# Patient Record
Sex: FEMALE | Race: ASIAN | NOT HISPANIC OR LATINO
[De-identification: names, ages, dates, MRNs, and addresses within clinical notes are randomized per-mention and may not be internally consistent; named-entity substitution may affect disease eponyms.]

---

## 2017-01-07 ENCOUNTER — OTHER (OUTPATIENT)
Age: 62
End: 2017-01-07

## 2017-02-10 ENCOUNTER — MOBILE ON CALL (OUTPATIENT)
Age: 62
End: 2017-02-10

## 2017-02-21 ENCOUNTER — APPOINTMENT (OUTPATIENT)
Dept: ULTRASOUND IMAGING | Facility: CLINIC | Age: 62
End: 2017-02-21

## 2017-02-21 ENCOUNTER — APPOINTMENT (OUTPATIENT)
Dept: MAMMOGRAPHY | Facility: CLINIC | Age: 62
End: 2017-02-21

## 2017-02-23 ENCOUNTER — APPOINTMENT (OUTPATIENT)
Dept: OPHTHALMOLOGY | Facility: CLINIC | Age: 62
End: 2017-02-23

## 2017-02-27 ENCOUNTER — MEDICATION RENEWAL (OUTPATIENT)
Age: 62
End: 2017-02-27

## 2017-03-21 ENCOUNTER — CLINICAL ADVICE (OUTPATIENT)
Age: 62
End: 2017-03-21

## 2017-03-21 DIAGNOSIS — H40.009 PREGLAUCOMA, UNSPECIFIED, UNSPECIFIED EYE: ICD-10-CM

## 2017-04-19 ENCOUNTER — APPOINTMENT (OUTPATIENT)
Dept: PULMONOLOGY | Facility: CLINIC | Age: 62
End: 2017-04-19

## 2017-05-24 ENCOUNTER — MEDICATION RENEWAL (OUTPATIENT)
Age: 62
End: 2017-05-24

## 2017-05-24 ENCOUNTER — APPOINTMENT (OUTPATIENT)
Dept: INTERNAL MEDICINE | Facility: CLINIC | Age: 62
End: 2017-05-24

## 2017-05-24 VITALS
HEIGHT: 63 IN | BODY MASS INDEX: 28 KG/M2 | SYSTOLIC BLOOD PRESSURE: 123 MMHG | HEART RATE: 75 BPM | WEIGHT: 158 LBS | DIASTOLIC BLOOD PRESSURE: 70 MMHG

## 2017-05-24 RX ORDER — CHROMIUM 200 MCG
TABLET ORAL
Refills: 0 | Status: ACTIVE | COMMUNITY

## 2017-07-13 ENCOUNTER — OTHER (OUTPATIENT)
Age: 62
End: 2017-07-13

## 2017-07-20 ENCOUNTER — CHART COPY (OUTPATIENT)
Age: 62
End: 2017-07-20

## 2017-07-25 ENCOUNTER — APPOINTMENT (OUTPATIENT)
Dept: INTERNAL MEDICINE | Facility: CLINIC | Age: 62
End: 2017-07-25

## 2017-07-25 VITALS
HEART RATE: 78 BPM | RESPIRATION RATE: 14 BRPM | BODY MASS INDEX: 29.41 KG/M2 | DIASTOLIC BLOOD PRESSURE: 58 MMHG | WEIGHT: 166 LBS | SYSTOLIC BLOOD PRESSURE: 122 MMHG

## 2017-07-25 DIAGNOSIS — Z82.49 FAMILY HISTORY OF ISCHEMIC HEART DISEASE AND OTHER DISEASES OF THE CIRCULATORY SYSTEM: ICD-10-CM

## 2017-07-25 DIAGNOSIS — F15.90 OTHER STIMULANT USE, UNSPECIFIED, UNCOMPLICATED: ICD-10-CM

## 2017-07-25 DIAGNOSIS — Z83.49 FAMILY HISTORY OF OTHER ENDOCRINE, NUTRITIONAL AND METABOLIC DISEASES: ICD-10-CM

## 2017-07-25 DIAGNOSIS — Z82.5 FAMILY HISTORY OF ASTHMA AND OTHER CHRONIC LOWER RESPIRATORY DISEASES: ICD-10-CM

## 2017-07-25 DIAGNOSIS — Z83.518 FAMILY HISTORY OF OTHER SPECIFIED EYE DISORDER: ICD-10-CM

## 2017-07-25 DIAGNOSIS — Z12.11 ENCOUNTER FOR SCREENING FOR MALIGNANT NEOPLASM OF COLON: ICD-10-CM

## 2017-07-25 DIAGNOSIS — M25.511 PAIN IN RIGHT SHOULDER: ICD-10-CM

## 2017-07-25 DIAGNOSIS — Z87.898 PERSONAL HISTORY OF OTHER SPECIFIED CONDITIONS: ICD-10-CM

## 2017-07-25 DIAGNOSIS — M75.111 INCOMPLETE ROTATOR CUFF TEAR OR RUPTURE OF RIGHT SHOULDER, NOT SPECIFIED AS TRAUMATIC: ICD-10-CM

## 2017-07-25 DIAGNOSIS — Z80.6 FAMILY HISTORY OF LEUKEMIA: ICD-10-CM

## 2017-08-28 ENCOUNTER — APPOINTMENT (OUTPATIENT)
Dept: OPHTHALMOLOGY | Facility: CLINIC | Age: 62
End: 2017-08-28
Payer: COMMERCIAL

## 2017-08-28 PROCEDURE — 92083 EXTENDED VISUAL FIELD XM: CPT

## 2017-08-28 PROCEDURE — 92012 INTRM OPH EXAM EST PATIENT: CPT

## 2017-12-01 ENCOUNTER — OTHER (OUTPATIENT)
Age: 62
End: 2017-12-01

## 2018-01-18 ENCOUNTER — OTHER (OUTPATIENT)
Age: 63
End: 2018-01-18

## 2018-01-23 ENCOUNTER — OTHER (OUTPATIENT)
Age: 63
End: 2018-01-23

## 2018-01-25 ENCOUNTER — APPOINTMENT (OUTPATIENT)
Dept: INTERNAL MEDICINE | Facility: CLINIC | Age: 63
End: 2018-01-25

## 2018-02-02 ENCOUNTER — APPOINTMENT (OUTPATIENT)
Dept: INTERNAL MEDICINE | Facility: CLINIC | Age: 63
End: 2018-02-02
Payer: COMMERCIAL

## 2018-02-02 VITALS
DIASTOLIC BLOOD PRESSURE: 68 MMHG | RESPIRATION RATE: 14 BRPM | BODY MASS INDEX: 29.41 KG/M2 | WEIGHT: 166 LBS | SYSTOLIC BLOOD PRESSURE: 105 MMHG | HEART RATE: 64 BPM

## 2018-02-02 PROCEDURE — 99396 PREV VISIT EST AGE 40-64: CPT

## 2018-02-02 PROCEDURE — 99214 OFFICE O/P EST MOD 30 MIN: CPT | Mod: 25

## 2018-02-02 PROCEDURE — G0444 DEPRESSION SCREEN ANNUAL: CPT

## 2018-02-02 RX ORDER — ESTRADIOL 10 UG/1
10 TABLET VAGINAL
Qty: 8 | Refills: 0 | Status: ACTIVE | COMMUNITY
Start: 2017-10-06

## 2018-02-28 ENCOUNTER — APPOINTMENT (OUTPATIENT)
Dept: OPHTHALMOLOGY | Facility: CLINIC | Age: 63
End: 2018-02-28
Payer: COMMERCIAL

## 2018-02-28 DIAGNOSIS — H40.003 PREGLAUCOMA, UNSPECIFIED, BILATERAL: ICD-10-CM

## 2018-02-28 PROCEDURE — 92014 COMPRE OPH EXAM EST PT 1/>: CPT

## 2018-04-17 ENCOUNTER — RESULT CHARGE (OUTPATIENT)
Age: 63
End: 2018-04-17

## 2018-05-09 ENCOUNTER — RX RENEWAL (OUTPATIENT)
Age: 63
End: 2018-05-09

## 2018-08-22 ENCOUNTER — APPOINTMENT (OUTPATIENT)
Dept: INTERNAL MEDICINE | Facility: CLINIC | Age: 63
End: 2018-08-22
Payer: COMMERCIAL

## 2018-08-22 PROCEDURE — ZZZZZ: CPT

## 2018-08-28 ENCOUNTER — APPOINTMENT (OUTPATIENT)
Dept: INTERNAL MEDICINE | Facility: CLINIC | Age: 63
End: 2018-08-28
Payer: COMMERCIAL

## 2018-08-28 VITALS — HEART RATE: 70 BPM | RESPIRATION RATE: 14 BRPM | DIASTOLIC BLOOD PRESSURE: 70 MMHG | SYSTOLIC BLOOD PRESSURE: 108 MMHG

## 2018-08-28 PROCEDURE — 99213 OFFICE O/P EST LOW 20 MIN: CPT

## 2018-08-28 RX ORDER — IPRATROPIUM BROMIDE 17 UG/1
17 AEROSOL, METERED RESPIRATORY (INHALATION) 4 TIMES DAILY
Qty: 1 | Refills: 1 | Status: DISCONTINUED | COMMUNITY
Start: 2018-02-02 | End: 2018-08-28

## 2018-09-04 ENCOUNTER — OTHER (OUTPATIENT)
Age: 63
End: 2018-09-04

## 2018-09-18 ENCOUNTER — APPOINTMENT (OUTPATIENT)
Dept: INTERNAL MEDICINE | Facility: CLINIC | Age: 63
End: 2018-09-18
Payer: COMMERCIAL

## 2018-09-18 VITALS
DIASTOLIC BLOOD PRESSURE: 76 MMHG | SYSTOLIC BLOOD PRESSURE: 122 MMHG | WEIGHT: 168 LBS | HEART RATE: 70 BPM | RESPIRATION RATE: 14 BRPM | BODY MASS INDEX: 29.76 KG/M2

## 2018-09-18 DIAGNOSIS — Z87.898 PERSONAL HISTORY OF OTHER SPECIFIED CONDITIONS: ICD-10-CM

## 2018-09-18 PROCEDURE — 99396 PREV VISIT EST AGE 40-64: CPT

## 2018-09-18 RX ORDER — SCOPOLAMINE 1.5 MG/1
1 PATCH, EXTENDED RELEASE TRANSDERMAL
Qty: 2 | Refills: 0 | Status: DISCONTINUED | COMMUNITY
Start: 2018-08-28 | End: 2018-09-18

## 2018-09-18 RX ORDER — ASPIRIN 81 MG
81 TABLET, DELAYED RELEASE (ENTERIC COATED) ORAL
Refills: 0 | Status: ACTIVE | COMMUNITY

## 2018-09-18 NOTE — HISTORY OF PRESENT ILLNESS
[FreeTextEntry1] : Here for CPE.\par  [de-identified] : Generally feels well with no specific complaints.\par

## 2018-09-18 NOTE — HEALTH RISK ASSESSMENT
[Excellent] : ~his/her~  mood as  excellent [] : No [No falls in past year] : Patient reported no falls in the past year [0] : 2) Feeling down, depressed, or hopeless: Not at all (0) [[id number]] : 0 [Change in mental status noted] : No change in mental status noted [Language] : denies difficulty with language [Behavior] : denies difficulty with behavior [Learning/Retaining New Information] : denies difficulty learning/retaining new information [Handling Complex Tasks] : denies difficulty handling complex tasks [Reasoning] : denies difficulty with reasoning [Spatial Ability and Orientation] : denies difficulty with spatial ability and orientation [None] : None [With Family] : lives with family [Employed] : employed [] :  [Feels Safe at Home] : Feels safe at home [Fully functional (bathing, dressing, toileting, transferring, walking, feeding)] : Fully functional (bathing, dressing, toileting, transferring, walking, feeding) [Fully functional (using the telephone, shopping, preparing meals, housekeeping, doing laundry, using] : Fully functional and needs no help or supervision to perform IADLs (using the telephone, shopping, preparing meals, housekeeping, doing laundry, using transportation, managing medications and managing finances) [Reports changes in hearing] : Reports no changes in hearing [Reports changes in vision] : Reports no changes in vision [Reports changes in dental health] : Reports no changes in dental health [Smoke Detector] : smoke detector [Seat Belt] :  uses seat belt

## 2019-05-03 ENCOUNTER — APPOINTMENT (OUTPATIENT)
Dept: INTERNAL MEDICINE | Facility: CLINIC | Age: 64
End: 2019-05-03
Payer: COMMERCIAL

## 2019-05-03 VITALS
HEIGHT: 63 IN | SYSTOLIC BLOOD PRESSURE: 120 MMHG | HEART RATE: 73 BPM | DIASTOLIC BLOOD PRESSURE: 75 MMHG | BODY MASS INDEX: 31.01 KG/M2 | WEIGHT: 175 LBS | OXYGEN SATURATION: 98 %

## 2019-05-03 DIAGNOSIS — R20.2 PARESTHESIA OF SKIN: ICD-10-CM

## 2019-05-03 PROCEDURE — 99213 OFFICE O/P EST LOW 20 MIN: CPT

## 2019-05-05 PROBLEM — R20.2 HAND TINGLING: Status: ACTIVE | Noted: 2019-05-05

## 2019-05-05 NOTE — PHYSICAL EXAM
[No Acute Distress] : no acute distress [Well-Appearing] : well-appearing [Normal Sclera/Conjunctiva] : normal sclera/conjunctiva [No Respiratory Distress] : no respiratory distress  [Clear to Auscultation] : lungs were clear to auscultation bilaterally [Regular Rhythm] : with a regular rhythm [Normal S1, S2] : normal S1 and S2 [Normal Anterior Cervical Nodes] : no anterior cervical lymphadenopathy [No CVA Tenderness] : no CVA  tenderness [No Spinal Tenderness] : no spinal tenderness [No Joint Swelling] : no joint swelling [Grossly Normal Strength/Tone] : grossly normal strength/tone [No Rash] : no rash [Normal Gait] : normal gait [Coordination Grossly Intact] : coordination grossly intact [No Focal Deficits] : no focal deficits [de-identified] : Azeben positive

## 2019-05-05 NOTE — REVIEW OF SYSTEMS
[Joint Pain] : no joint pain [Joint Stiffness] : no joint stiffness [Muscle Weakness] : no muscle weakness [Negative] : Heme/Lymph [FreeTextEntry9] : s [de-identified] : see hpi

## 2019-05-05 NOTE — HISTORY OF PRESENT ILLNESS
[de-identified] : for past 8 weeks - gets intermittent tingling in her finger.  mostly feels it when she is cooking.  Has been coming more frequently lately.  Works all day on computer. no weakness. no truama. \par bilateral rotator cuff inflammation - s/p cortisone shots

## 2019-06-02 LAB
ESTIMATED AVERAGE GLUCOSE: 134 MG/DL
FOLATE SERPL-MCNC: 14.4 NG/ML
HBA1C MFR BLD HPLC: 6.3 %
TSH SERPL-ACNC: 1.02 UIU/ML
VIT B12 SERPL-MCNC: 768 PG/ML

## 2019-09-06 ENCOUNTER — OTHER (OUTPATIENT)
Age: 64
End: 2019-09-06

## 2019-09-23 NOTE — PHYSICAL EXAM
[No Acute Distress] : no acute distress [Well Developed] : well developed [Well Nourished] : well nourished [Normal Sclera/Conjunctiva] : normal sclera/conjunctiva [Well-Appearing] : well-appearing [PERRL] : pupils equal round and reactive to light [EOMI] : extraocular movements intact [Normal Outer Ear/Nose] : the outer ears and nose were normal in appearance [Normal Oropharynx] : the oropharynx was normal [No JVD] : no jugular venous distention [No Lymphadenopathy] : no lymphadenopathy [Thyroid Normal, No Nodules] : the thyroid was normal and there were no nodules present [Supple] : supple [No Accessory Muscle Use] : no accessory muscle use [No Respiratory Distress] : no respiratory distress  [Clear to Auscultation] : lungs were clear to auscultation bilaterally [Normal Rate] : normal rate  [Normal S1, S2] : normal S1 and S2 [Regular Rhythm] : with a regular rhythm [No Murmur] : no murmur heard [No Carotid Bruits] : no carotid bruits [No Abdominal Bruit] : a ~M bruit was not heard ~T in the abdomen [No Varicosities] : no varicosities [Pedal Pulses Present] : the pedal pulses are present [No Palpable Aorta] : no palpable aorta [No Edema] : there was no peripheral edema [No Extremity Clubbing/Cyanosis] : no extremity clubbing/cyanosis [Soft] : abdomen soft [Non Tender] : non-tender [Non-distended] : non-distended [No Masses] : no abdominal mass palpated [No HSM] : no HSM [Normal Bowel Sounds] : normal bowel sounds [Normal Posterior Cervical Nodes] : no posterior cervical lymphadenopathy [No CVA Tenderness] : no CVA  tenderness [Normal Anterior Cervical Nodes] : no anterior cervical lymphadenopathy [No Spinal Tenderness] : no spinal tenderness [No Joint Swelling] : no joint swelling [Grossly Normal Strength/Tone] : grossly normal strength/tone [No Rash] : no rash [No Focal Deficits] : no focal deficits [Coordination Grossly Intact] : coordination grossly intact [Deep Tendon Reflexes (DTR)] : deep tendon reflexes were 2+ and symmetric [Normal Gait] : normal gait [Normal Affect] : the affect was normal [Normal Insight/Judgement] : insight and judgment were intact

## 2019-09-24 ENCOUNTER — APPOINTMENT (OUTPATIENT)
Dept: INTERNAL MEDICINE | Facility: CLINIC | Age: 64
End: 2019-09-24
Payer: COMMERCIAL

## 2019-09-24 ENCOUNTER — NON-APPOINTMENT (OUTPATIENT)
Age: 64
End: 2019-09-24

## 2019-09-24 VITALS — HEART RATE: 70 BPM | RESPIRATION RATE: 14 BRPM | DIASTOLIC BLOOD PRESSURE: 60 MMHG | SYSTOLIC BLOOD PRESSURE: 108 MMHG

## 2019-09-24 PROCEDURE — 90688 IIV4 VACCINE SPLT 0.5 ML IM: CPT

## 2019-09-24 PROCEDURE — G0442 ANNUAL ALCOHOL SCREEN 15 MIN: CPT

## 2019-09-24 PROCEDURE — G0008: CPT

## 2019-09-24 PROCEDURE — 99396 PREV VISIT EST AGE 40-64: CPT | Mod: 25

## 2019-09-24 PROCEDURE — G0444 DEPRESSION SCREEN ANNUAL: CPT

## 2019-09-24 PROCEDURE — 93000 ELECTROCARDIOGRAM COMPLETE: CPT

## 2019-09-24 NOTE — HEALTH RISK ASSESSMENT
[Excellent] : ~his/her~  mood as  excellent [Yes] : Yes [Monthly or less (1 pt)] : Monthly or less (1 point) [3 or 4 (1 pt)] : 3 or 4  (1 point) [Never (0 pts)] : Never (0 points) [No] : In the past 12 months have you used drugs other than those required for medical reasons? No [No falls in past year] : Patient reported no falls in the past year [0] : 2) Feeling down, depressed, or hopeless: Not at all (0) [None] : None [With Family] : lives with family [Employed] : employed [] :  [Feels Safe at Home] : Feels safe at home [Fully functional (bathing, dressing, toileting, transferring, walking, feeding)] : Fully functional (bathing, dressing, toileting, transferring, walking, feeding) [Fully functional (using the telephone, shopping, preparing meals, housekeeping, doing laundry, using] : Fully functional and needs no help or supervision to perform IADLs (using the telephone, shopping, preparing meals, housekeeping, doing laundry, using transportation, managing medications and managing finances) [Reports normal functional visual acuity (ie: able to read med bottle)] : Reports normal functional visual acuity [Seat Belt] :  uses seat belt [Smoke Detector] : smoke detector [Audit-CScore] : 2 [] : No [[id number]] : 0 [Change in mental status noted] : No change in mental status noted [Language] : denies difficulty with language [Behavior] : denies difficulty with behavior [Handling Complex Tasks] : denies difficulty handling complex tasks [Reasoning] : denies difficulty with reasoning [Spatial Ability and Orientation] : denies difficulty with spatial ability and orientation [Reports changes in hearing] : Reports no changes in hearing [Reports changes in vision] : Reports no changes in vision

## 2019-09-24 NOTE — HISTORY OF PRESENT ILLNESS
[FreeTextEntry1] : Here for CPE.\par  [de-identified] : Generally feels well with no specific complaints.\par \par Recent lab results reviewed.\par \par Denies headache, dizziness.\par Denies rash, fatigue, fever, weight loss, anorexia.\par Denies cough, wheezing.\par Denies CP, SOB, EASON, edema, palpitations.\par Denies abdominal pain, N/V/D/C. Denies BRBPR, melena\par Denies dysuria, frequency, hematuria.\par

## 2019-10-09 ENCOUNTER — RX RENEWAL (OUTPATIENT)
Age: 64
End: 2019-10-09

## 2019-11-19 ENCOUNTER — APPOINTMENT (OUTPATIENT)
Dept: INTERNAL MEDICINE | Facility: CLINIC | Age: 64
End: 2019-11-19

## 2019-12-09 ENCOUNTER — TRANSCRIPTION ENCOUNTER (OUTPATIENT)
Age: 64
End: 2019-12-09

## 2020-01-10 ENCOUNTER — APPOINTMENT (OUTPATIENT)
Dept: INTERNAL MEDICINE | Facility: CLINIC | Age: 65
End: 2020-01-10
Payer: COMMERCIAL

## 2020-01-10 PROCEDURE — 99213 OFFICE O/P EST LOW 20 MIN: CPT

## 2020-01-11 NOTE — PHYSICAL EXAM
[Normal] : no respiratory distress, lungs were clear to auscultation bilaterally and no accessory muscle use [No Acute Distress] : no acute distress [Normal Right Ear] : Right ear: the external ear, canal and tympanic membrane were normal [Normal Oropharynx] : Oropharynx: the oral mucosa, hard and soft palates, tongue, tonsils, and posterior pharynx were normal [Nasal Discharge Purulent] : a purulent discharge [Normal Left Ear] : Left ear: the external ear, canal and tympanic membrane were normal [Nasal Mucosa Red] : red

## 2020-01-11 NOTE — HISTORY OF PRESENT ILLNESS
[Initial Evaluation, This Episode] : an initial evaluation of this episode of [Nasal Congestion] : nasal congestion [Sore Throat] : sore throat [Productive Cough] : productive cough [Facial Pain] : no facial pain [Facial Pressure] : no facial pressure [Hoarseness] : no hoarseness [Ear Pain] : no ear pain [Gradual] : gradual [Unchanged] : ~he/she~ reports the symptoms are unchanged [Moderate] : moderate in severity [___ Month(s) Ago] : [unfilled] month(s) ago [General Malaise] : no general malaise [Fever] : no fever [Chills] : no chills [Headache] : headache [Swollen Lymph Nodes] : no swollen lymph nodes [Vomiting] : no vomiting [Nausea] : no nausea

## 2020-02-11 ENCOUNTER — APPOINTMENT (OUTPATIENT)
Dept: INTERNAL MEDICINE | Facility: CLINIC | Age: 65
End: 2020-02-11
Payer: COMMERCIAL

## 2020-02-11 PROCEDURE — 99213 OFFICE O/P EST LOW 20 MIN: CPT

## 2020-02-11 NOTE — HISTORY OF PRESENT ILLNESS
[de-identified] : RTC f/u recent URI\par Continued cough. Non productive.  No fever.  NO SOB. \par GLobus sensation. No odynophagia or dysphagia. \par Some heartburn despite PPI. \par \par No N/V/D/C, pain, BPR, proctodynia.

## 2020-02-11 NOTE — PHYSICAL EXAM
[No Acute Distress] : no acute distress [Normal Outer Ear/Nose] : the outer ears and nose were normal in appearance [Normal Oropharynx] : the oropharynx was normal [Normal] : no respiratory distress, lungs were clear to auscultation bilaterally and no accessory muscle use

## 2020-03-12 ENCOUNTER — APPOINTMENT (OUTPATIENT)
Dept: OTOLARYNGOLOGY | Facility: CLINIC | Age: 65
End: 2020-03-12
Payer: COMMERCIAL

## 2020-03-12 VITALS
WEIGHT: 172 LBS | HEART RATE: 73 BPM | HEIGHT: 63 IN | SYSTOLIC BLOOD PRESSURE: 125 MMHG | DIASTOLIC BLOOD PRESSURE: 79 MMHG | BODY MASS INDEX: 30.48 KG/M2

## 2020-03-12 PROCEDURE — 31575 DIAGNOSTIC LARYNGOSCOPY: CPT

## 2020-03-12 PROCEDURE — 99204 OFFICE O/P NEW MOD 45 MIN: CPT | Mod: 25

## 2020-03-12 NOTE — CONSULT LETTER
[Dear  ___] : Dear  [unfilled], [Please see my note below.] : Please see my note below. [Consult Closing:] : Thank you very much for allowing me to participate in the care of this patient.  If you have any questions, please do not hesitate to contact me. [Sincerely,] : Sincerely, [FreeTextEntry3] : Leonardo Hooper MD\par [facility]\par Otolaryngology and Facial Plastics\par Associated Professor, [employer]\par

## 2020-03-12 NOTE — ASSESSMENT
[FreeTextEntry1] : Patient with persistent cough was some reflux medication cough actually improved here for evaluation fiberoptic evaluation of the larynx was essentially normal no tumors masses or polyps patient was reassured she does have a deviated septum the left nasal cavity she was put on a Medrol Dosepak fully expecting this to improve her symptoms even more and she'll followup with us as needed.

## 2020-03-12 NOTE — HISTORY OF PRESENT ILLNESS
[de-identified] : PAtient has had issues with coughing each allergy season but states that she has now had a cough constantly for the last 6 months or so. SHe has been treated with nasal sprays and allergy medications with no benefit. SHe went to see her other Doctor who gave her antibtiocis for a possible sinus infection in January, which she doesn’t think helped the coughing. In february she tried famotidine at night and omeprazole in the morning which helped her coughing.  SHe still feels a nor lump in the throat as well that causes throat clearing.

## 2020-06-20 ENCOUNTER — TRANSCRIPTION ENCOUNTER (OUTPATIENT)
Age: 65
End: 2020-06-20

## 2020-06-22 ENCOUNTER — APPOINTMENT (OUTPATIENT)
Dept: UROLOGY | Facility: CLINIC | Age: 65
End: 2020-06-22
Payer: COMMERCIAL

## 2020-06-22 VITALS
OXYGEN SATURATION: 96 % | HEART RATE: 95 BPM | BODY MASS INDEX: 29.59 KG/M2 | DIASTOLIC BLOOD PRESSURE: 82 MMHG | WEIGHT: 167 LBS | RESPIRATION RATE: 14 BRPM | HEIGHT: 63 IN | SYSTOLIC BLOOD PRESSURE: 133 MMHG

## 2020-06-22 PROCEDURE — 99203 OFFICE O/P NEW LOW 30 MIN: CPT

## 2020-06-22 NOTE — HISTORY OF PRESENT ILLNESS
[FreeTextEntry1] : patient with pink tinged urine on tissue after wiping for the past 2 months and then had blood tinged urine with voiding 2 days ago that led [place] [facility] [place] urine was dipped ( + blood and leuk est) and cx : negative and labs showed normal WBC 5.8 and CREAT 0.72 \par she was told to f/u with  \par she denies any LUTS, non smoker and not exposed to smoke, no hx of renal stones or fam hx of renal stones\par she denies any associated N/V or fever , no dysuira or flank pain but does admit to SP pain /cramping\par she states she has used vag insert for dysparenia but has stopped for the past 3 months despite continuing to be sexually active\par weight stable \par seen by GYN and transvag sono done - negative by report --last done last year

## 2020-06-22 NOTE — PHYSICAL EXAM
[General Appearance - Well Developed] : well developed [Well Groomed] : well groomed [Normal Appearance] : normal appearance [General Appearance - Well Nourished] : well nourished [Edema] : no peripheral edema [General Appearance - In No Acute Distress] : no acute distress [Respiration, Rhythm And Depth] : normal respiratory rhythm and effort [Exaggerated Use Of Accessory Muscles For Inspiration] : no accessory muscle use [Abdomen Mass (___ Cm)] : no abdominal mass palpated [Abdomen Soft] : soft [Abdomen Tenderness] : non-tender [Abdomen Hernia] : no hernia was discovered [Costovertebral Angle Tenderness] : no ~M costovertebral angle tenderness [] : no rash [Normal Station and Gait] : the gait and station were normal for the patient's age [Affect] : the affect was normal [Oriented To Time, Place, And Person] : oriented to person, place, and time [No Focal Deficits] : no focal deficits [Mood] : the mood was normal [Not Anxious] : not anxious [Cervical Lymph Nodes Enlarged Posterior Bilaterally] : posterior cervical [Cervical Lymph Nodes Enlarged Anterior Bilaterally] : anterior cervical [Supraclavicular Lymph Nodes Enlarged Bilaterally] : supraclavicular

## 2020-06-22 NOTE — REVIEW OF SYSTEMS
[Painful [place]] : painful [place] [Presently in menopause ___] : presently in menopause [unfilled] [Negative] : Heme/Lymph [see HPI] : see HPI

## 2020-06-22 NOTE — ASSESSMENT
[FreeTextEntry1] : patient with hx of dysparenia and vag dryness treated with vag insert but not for 3 months\par 2 months of pinkish urine seen on tissue after wiping and \par 2days ago noted blood tinged urine \par eitiloiges discussed\par will check UA and CYTOLOGY \par will get upper tracts with CT urogram ( to do at [facility])\par rtc for CYSTO

## 2020-06-24 ENCOUNTER — APPOINTMENT (OUTPATIENT)
Dept: CT IMAGING | Facility: HOSPITAL | Age: 65
End: 2020-06-24

## 2020-06-24 LAB
APPEARANCE: CLEAR
BACTERIA: NEGATIVE
BILIRUBIN URINE: NEGATIVE
BLOOD URINE: ABNORMAL
COLOR: NORMAL
GLUCOSE QUALITATIVE U: NEGATIVE
HYALINE CASTS: 1 /LPF
KETONES URINE: NEGATIVE
LEUKOCYTE ESTERASE URINE: NEGATIVE
MICROSCOPIC-UA: NORMAL
NITRITE URINE: NEGATIVE
PH URINE: 6
PROTEIN URINE: NORMAL
RED BLOOD CELLS URINE: 39 /HPF
SPECIFIC GRAVITY URINE: 1
SQUAMOUS EPITHELIAL CELLS: 1 /HPF
URINE CYTOLOGY: NORMAL
UROBILINOGEN URINE: NORMAL
WHITE BLOOD CELLS URINE: 3 /HPF

## 2020-06-25 ENCOUNTER — OUTPATIENT (OUTPATIENT)
Dept: OUTPATIENT SERVICES | Facility: HOSPITAL | Age: 65
LOS: 1 days | End: 2020-06-25
Payer: COMMERCIAL

## 2020-06-25 ENCOUNTER — APPOINTMENT (OUTPATIENT)
Dept: UROLOGY | Facility: CLINIC | Age: 65
End: 2020-06-25
Payer: COMMERCIAL

## 2020-06-25 VITALS — SYSTOLIC BLOOD PRESSURE: 132 MMHG | DIASTOLIC BLOOD PRESSURE: 74 MMHG | TEMPERATURE: 97.2 F | HEART RATE: 64 BPM

## 2020-06-25 DIAGNOSIS — N20.1 CALCULUS OF URETER: ICD-10-CM

## 2020-06-25 DIAGNOSIS — J01.90 ACUTE SINUSITIS, UNSPECIFIED: ICD-10-CM

## 2020-06-25 DIAGNOSIS — R35.0 FREQUENCY OF MICTURITION: ICD-10-CM

## 2020-06-25 DIAGNOSIS — L50.0 ALLERGIC URTICARIA: ICD-10-CM

## 2020-06-25 DIAGNOSIS — N20.0 CALCULUS OF KIDNEY: ICD-10-CM

## 2020-06-25 PROCEDURE — 52000 CYSTOURETHROSCOPY: CPT

## 2020-06-25 PROCEDURE — 99214 OFFICE O/P EST MOD 30 MIN: CPT | Mod: 25

## 2020-06-25 NOTE — PHYSICAL EXAM
[General Appearance - Well Nourished] : well nourished [General Appearance - Well Developed] : well developed [Well Groomed] : well groomed [Normal Appearance] : normal appearance [General Appearance - In No Acute Distress] : no acute distress [Edema] : no peripheral edema [Abdomen Soft] : soft [Respiration, Rhythm And Depth] : normal respiratory rhythm and effort [Exaggerated Use Of Accessory Muscles For Inspiration] : no accessory muscle use [Costovertebral Angle Tenderness] : no ~M costovertebral angle tenderness [Urinary Bladder Findings] : the bladder was normal on palpation [Abdomen Tenderness] : non-tender [] : no rash [Normal Station and Gait] : the gait and station were normal for the patient's age [No Focal Deficits] : no focal deficits [Oriented To Time, Place, And Person] : oriented to person, place, and time [Affect] : the affect was normal [Not Anxious] : not anxious [Mood] : the mood was normal [No Palpable Adenopathy] : no palpable adenopathy

## 2020-06-29 ENCOUNTER — APPOINTMENT (OUTPATIENT)
Dept: UROLOGY | Facility: CLINIC | Age: 65
End: 2020-06-29

## 2020-06-30 LAB
APPEARANCE: CLEAR
BACTERIA: NEGATIVE
BILIRUBIN URINE: NEGATIVE
BLOOD URINE: ABNORMAL
COLOR: NORMAL
GLUCOSE QUALITATIVE U: NEGATIVE
HYALINE CASTS: 0 /LPF
KETONES URINE: NEGATIVE
LEUKOCYTE ESTERASE URINE: ABNORMAL
MICROSCOPIC-UA: NORMAL
NITRITE URINE: NEGATIVE
PH URINE: 6.5
PROTEIN URINE: NORMAL
RED BLOOD CELLS URINE: 11 /HPF
SPECIFIC GRAVITY URINE: 1.01
SQUAMOUS EPITHELIAL CELLS: 6 /HPF
URINE CYTOLOGY: NORMAL
UROBILINOGEN URINE: NORMAL
WHITE BLOOD CELLS URINE: 25 /HPF

## 2020-07-01 NOTE — ADDENDUM
[FreeTextEntry1] : Entered by Alexis Young, acting as scribe for Dr. Manuel Oleary.\par \par The documentation recorded by the scribe accurately reflects the service I personally performed and the decisions made by me.

## 2020-07-01 NOTE — LETTER BODY
[FreeTextEntry1] : Curtis Manning MD\par [address]\[place] [address]\par [phone]\par \par Dear Dr. Manning,\par \par Reason for visit: Gross hematuria. Left renal stone.\par \par This is a 64 year-old woman with gross hematuria referred for evaluation. Patient underwent CT urogram at [facility] demonstrating a left kidney stone measuring 4 mm and a right ureteral stone. Her urine cytology was unremarkable. She denies any dysuria or urinary incontinence. The patient denies any aggravating or relieving factors. The patient denies any interference of function. The patient is entirely asymptomatic. All other review of systems are negative. She has no cancer in her family medical history. She has no previous surgical history. Past medical history, family history and social history were inquired and were noncontributory to current condition. The patient does not use tobacco or drink alcohol. Medications and allergies were reviewed. She has no known allergies to medication. \par \par On examination, the patient is a healthy-appearing woman in no acute distress. She is alert and oriented follows commands. She  has normal mood and affect. She is normocephalic. Neck is supple. Oral no thrush. Respirations are unlabored. Abdomen is soft and nontender. Bladder is nonpalpable. No CVA tenderness. Neurologically she is grossly intact. No peripheral edema. Skin without gross abnormality.\par \par I personally reviewed CT images with the patient today. Images demonstrated a left sided renal stone measuring 4 mm and a right ureteral stone.\par \par Her cystoscopy today was unremarkable.\par \par Assessment: Gross hematuria. Left kidney stone. Right ureteral stone.\par \par I counseled the patient on the various etiologies of the gross hematuria. I discussed the risk of occult malignancy. Given her unremarkable ultrasound and cytology, I discussed that her gross hematuria is secondary to her ureteral calculus. I encouraged patient to maintain a low-protein low-sodium diet. I also encouraged hydration to allow stone passage. I recommend she begin a trial of Flomax for medical expulsion therapy. I discussed the potential side effects of the medication. I counseled the patient on its use and side effects. If the patient develops any side effects, the patient will discontinue the medication and contact me. I answered the patient's questions. The risks and expected outcomes were discussed. The patient will follow up as directed and contact me with any questions or concerns. Thank you for the opportunity to participate in the care of Ms. CASIANO. I will keep you updated on her progress.\par \par Plan: Trial of Flomax.  Follow up in 1 month for renal US.

## 2020-07-02 ENCOUNTER — APPOINTMENT (OUTPATIENT)
Dept: UROLOGY | Facility: CLINIC | Age: 65
End: 2020-07-02

## 2020-07-15 ENCOUNTER — RECORD ABSTRACTING (OUTPATIENT)
Age: 65
End: 2020-07-15

## 2020-07-15 LAB
25(OH)D3 SERPL-MCNC: 54.7 NG/ML
ALBUMIN SERPL ELPH-MCNC: 4.5 G/DL
ALP BLD-CCNC: 82 U/L
ALT SERPL-CCNC: 13 U/L
ANION GAP SERPL CALC-SCNC: 13 MMOL/L
AST SERPL-CCNC: 17 U/L
BASOPHILS # BLD AUTO: 0.03 K/UL
BASOPHILS NFR BLD AUTO: 0.6 %
BILIRUB SERPL-MCNC: 0.4 MG/DL
BUN SERPL-MCNC: 12 MG/DL
CALCIUM SERPL-MCNC: 9.7 MG/DL
CHLORIDE SERPL-SCNC: 105 MMOL/L
CHOLEST SERPL-MCNC: 160 MG/DL
CHOLEST/HDLC SERPL: 2.8 RATIO
CO2 SERPL-SCNC: 26 MMOL/L
CREAT SERPL-MCNC: 0.75 MG/DL
EOSINOPHIL # BLD AUTO: 0.08 K/UL
EOSINOPHIL NFR BLD AUTO: 1.6 %
ESTIMATED AVERAGE GLUCOSE: 120 MG/DL
GLUCOSE SERPL-MCNC: 102 MG/DL
HBA1C MFR BLD HPLC: 5.8 %
HCT VFR BLD CALC: 43.7 %
HDLC SERPL-MCNC: 57 MG/DL
HGB BLD-MCNC: 13.4 G/DL
IMM GRANULOCYTES NFR BLD AUTO: 0 %
LDLC SERPL CALC-MCNC: 87 MG/DL
LDLC SERPL DIRECT ASSAY-MCNC: 95 MG/DL
LYMPHOCYTES # BLD AUTO: 1.6 K/UL
LYMPHOCYTES NFR BLD AUTO: 31.7 %
MAN DIFF?: NORMAL
MCHC RBC-ENTMCNC: 29.8 PG
MCHC RBC-ENTMCNC: 30.7 GM/DL
MCV RBC AUTO: 97.1 FL
MONOCYTES # BLD AUTO: 0.4 K/UL
MONOCYTES NFR BLD AUTO: 7.9 %
NEUTROPHILS # BLD AUTO: 2.93 K/UL
NEUTROPHILS NFR BLD AUTO: 58.2 %
PLATELET # BLD AUTO: 252 K/UL
POTASSIUM SERPL-SCNC: 5.1 MMOL/L
PROT SERPL-MCNC: 7.1 G/DL
RBC # BLD: 4.5 M/UL
RBC # FLD: 12.3 %
SODIUM SERPL-SCNC: 144 MMOL/L
TRIGL SERPL-MCNC: 78 MG/DL
WBC # FLD AUTO: 5.04 K/UL

## 2020-07-16 LAB
SARS-COV-2 IGG SERPL IA-ACNC: <0.1 INDEX
SARS-COV-2 IGG SERPL QL IA: NEGATIVE

## 2020-07-24 ENCOUNTER — APPOINTMENT (OUTPATIENT)
Dept: INTERNAL MEDICINE | Facility: CLINIC | Age: 65
End: 2020-07-24

## 2020-07-27 ENCOUNTER — OUTPATIENT (OUTPATIENT)
Dept: OUTPATIENT SERVICES | Facility: HOSPITAL | Age: 65
LOS: 1 days | End: 2020-07-27
Payer: COMMERCIAL

## 2020-07-27 ENCOUNTER — LABORATORY RESULT (OUTPATIENT)
Age: 65
End: 2020-07-27

## 2020-07-27 ENCOUNTER — APPOINTMENT (OUTPATIENT)
Dept: UROLOGY | Facility: CLINIC | Age: 65
End: 2020-07-27
Payer: COMMERCIAL

## 2020-07-27 VITALS — TEMPERATURE: 98.4 F

## 2020-07-27 DIAGNOSIS — R35.0 FREQUENCY OF MICTURITION: ICD-10-CM

## 2020-07-27 PROCEDURE — 99213 OFFICE O/P EST LOW 20 MIN: CPT | Mod: 25

## 2020-07-27 PROCEDURE — 76775 US EXAM ABDO BACK WALL LIM: CPT | Mod: 26

## 2020-07-27 PROCEDURE — 76775 US EXAM ABDO BACK WALL LIM: CPT

## 2020-07-28 LAB — BACTERIA UR CULT: NORMAL

## 2020-08-02 NOTE — LETTER BODY
[FreeTextEntry1] : Curtis Manning MD\par [address]\[place] [address]\par [phone]\par \par Dear Dr. Manning,\par \par Reason for visit: Gross hematuria. Left renal stone. Right ureteral stone.\par \par This is a 64 year-old woman with gross hematuria, a right ureteral stone, and left renal stone. Patient underwent CT [facility]gram at [facility] demonstrating a left kidney stone measuring 4 mm and a right ureteral stone. Her cystoscopy and urine cytology in June were unremarkable. She returns today for renal ultrasound. Since her last visit, she has been taking Flomax and denies passing any stones. Patient denies any changes in health. She has no flank pain and offers no urinary concerns. She is entirely asymptomatic. All other review of systems are negative. Past medical history, family history and social history were unchanged. Medications and allergies were reviewed. She has no known allergies to medication. \par \par On examination, the patient is a healthy-appearing woman in no acute distress. She is alert and oriented follows commands. She has normal mood and affect. She is normocephalic. Neck is supple. Oral no thrush. Respirations are unlabored. Abdomen is soft and nontender. Bladder is nonpalpable. No CVA tenderness. Neurologically she is grossly intact. No peripheral edema. Skin without gross abnormality.\par \par I personally reviewed ultrasound images with the patient today. Images demonstrated mild fullness of the right renal pelvis and a slightly dilated proximal ureter. There is no stone visualized on the right. There is a 4.1 nonobstructing stone in the lower pole of the left kidney. Both kidneys are normal in size and echogenicity without hydronephrosis or solid masses visualized. \par \par Assessment: Gross hematuria. Left kidney stone. Right ureteral stone.\par \par I counseled the patient. She is entirely asymptomatic. Her ultrasound today demonstrated mild fullness of the right renal pelvis, although previous ureteral stone was not appreciated. She also has a left nonobstructing renal stone. Since she has not passed the stone, I discussed that her right ureteral stone is likely present. Given that she has no flank pain, she does not require surgical intervention. I recommend she hydrate frequently and continue taking Flomax for stone passage. I encouraged her to follow-up in 2 months to re-assess her symptoms. Patient understands that if he develops gross hematuria or any urinary discomfort, he will contact me for further evaluation. I answered the patient's questions. The risks and expected outcomes were discussed. The patient will follow up as directed and contact me with any questions or concerns. Thank you for the opportunity to participate in the care of Ms. CASIANO. I will keep you updated on her progress.\par \par Plan: Flomax. BMP. Urine culture. Follow up in 2 months.

## 2020-08-04 DIAGNOSIS — L50.0 ALLERGIC URTICARIA: ICD-10-CM

## 2020-08-04 DIAGNOSIS — J01.90 ACUTE SINUSITIS, UNSPECIFIED: ICD-10-CM

## 2020-08-12 LAB
APPEARANCE: CLEAR
BACTERIA: NEGATIVE
BILIRUBIN URINE: NEGATIVE
BLOOD URINE: NORMAL
COLOR: COLORLESS
GLUCOSE QUALITATIVE U: NEGATIVE
HYALINE CASTS: 0 /LPF
KETONES URINE: NEGATIVE
LEUKOCYTE ESTERASE URINE: ABNORMAL
MICROSCOPIC-UA: NORMAL
NITRITE URINE: NEGATIVE
PH URINE: 6.5
PROTEIN URINE: NEGATIVE
RED BLOOD CELLS URINE: 0 /HPF
SPECIFIC GRAVITY URINE: 1.01
SQUAMOUS EPITHELIAL CELLS: 1 /HPF
UROBILINOGEN URINE: NORMAL
WHITE BLOOD CELLS URINE: 2 /HPF

## 2020-08-13 LAB — BACTERIA UR CULT: NORMAL

## 2020-08-31 ENCOUNTER — OUTPATIENT (OUTPATIENT)
Dept: OUTPATIENT SERVICES | Facility: HOSPITAL | Age: 65
LOS: 1 days | End: 2020-08-31
Payer: COMMERCIAL

## 2020-08-31 VITALS
OXYGEN SATURATION: 99 % | SYSTOLIC BLOOD PRESSURE: 115 MMHG | RESPIRATION RATE: 18 BRPM | HEIGHT: 63.5 IN | TEMPERATURE: 98 F | WEIGHT: 169.98 LBS | HEART RATE: 96 BPM | DIASTOLIC BLOOD PRESSURE: 79 MMHG

## 2020-08-31 DIAGNOSIS — Z01.818 ENCOUNTER FOR OTHER PREPROCEDURAL EXAMINATION: ICD-10-CM

## 2020-08-31 DIAGNOSIS — Z98.891 HISTORY OF UTERINE SCAR FROM PREVIOUS SURGERY: Chronic | ICD-10-CM

## 2020-08-31 DIAGNOSIS — G47.33 OBSTRUCTIVE SLEEP APNEA (ADULT) (PEDIATRIC): ICD-10-CM

## 2020-08-31 DIAGNOSIS — N20.1 CALCULUS OF URETER: ICD-10-CM

## 2020-08-31 LAB
ANION GAP SERPL CALC-SCNC: 10 MMOL/L — SIGNIFICANT CHANGE UP (ref 5–17)
BUN SERPL-MCNC: 15 MG/DL — SIGNIFICANT CHANGE UP (ref 7–23)
CALCIUM SERPL-MCNC: 9.5 MG/DL — SIGNIFICANT CHANGE UP (ref 8.4–10.5)
CHLORIDE SERPL-SCNC: 105 MMOL/L — SIGNIFICANT CHANGE UP (ref 96–108)
CO2 SERPL-SCNC: 25 MMOL/L — SIGNIFICANT CHANGE UP (ref 22–31)
CREAT SERPL-MCNC: 0.71 MG/DL — SIGNIFICANT CHANGE UP (ref 0.5–1.3)
GLUCOSE SERPL-MCNC: 115 MG/DL — HIGH (ref 70–99)
HCT VFR BLD CALC: 41.2 % — SIGNIFICANT CHANGE UP (ref 34.5–45)
HGB BLD-MCNC: 13.2 G/DL — SIGNIFICANT CHANGE UP (ref 11.5–15.5)
MCHC RBC-ENTMCNC: 29.7 PG — SIGNIFICANT CHANGE UP (ref 27–34)
MCHC RBC-ENTMCNC: 32 GM/DL — SIGNIFICANT CHANGE UP (ref 32–36)
MCV RBC AUTO: 92.6 FL — SIGNIFICANT CHANGE UP (ref 80–100)
NRBC # BLD: 0 /100 WBCS — SIGNIFICANT CHANGE UP (ref 0–0)
PLATELET # BLD AUTO: 237 K/UL — SIGNIFICANT CHANGE UP (ref 150–400)
POTASSIUM SERPL-MCNC: 3.6 MMOL/L — SIGNIFICANT CHANGE UP (ref 3.5–5.3)
POTASSIUM SERPL-SCNC: 3.6 MMOL/L — SIGNIFICANT CHANGE UP (ref 3.5–5.3)
RBC # BLD: 4.45 M/UL — SIGNIFICANT CHANGE UP (ref 3.8–5.2)
RBC # FLD: 12 % — SIGNIFICANT CHANGE UP (ref 10.3–14.5)
SODIUM SERPL-SCNC: 140 MMOL/L — SIGNIFICANT CHANGE UP (ref 135–145)
WBC # BLD: 5.64 K/UL — SIGNIFICANT CHANGE UP (ref 3.8–10.5)
WBC # FLD AUTO: 5.64 K/UL — SIGNIFICANT CHANGE UP (ref 3.8–10.5)

## 2020-08-31 PROCEDURE — 87086 URINE CULTURE/COLONY COUNT: CPT

## 2020-08-31 PROCEDURE — G0463: CPT

## 2020-08-31 PROCEDURE — 80048 BASIC METABOLIC PNL TOTAL CA: CPT

## 2020-08-31 PROCEDURE — 85027 COMPLETE CBC AUTOMATED: CPT

## 2020-08-31 NOTE — H&P PST ADULT - NSICDXPROBLEM_GEN_ALL_CORE_FT
PROBLEM DIAGNOSES  Problem: Calculus of ureter  Assessment and Plan: bilateral ureteroscopy  laser lithotripsy  stone extraction  stent placement    Problem: Obstructive sleep apnea  Assessment and Plan: COLLINS precaution, OR booking was notified

## 2020-08-31 NOTE — H&P PST ADULT - GENITOURINARY COMMENTS
PRIOR AUTHORIZATION DENIED    Medication: LANsoprazole (PREVACID SOLUTAB) 30 MG ODT    Denial Date: 9/25/2019    Denial Rational: Patient must have a history of trial & failure to 2 of the formulary alternatives or have a contraindication or intolerance to the formulary alternatives: omeprazole and pantoprazole        Appeal Information:                        per HPI

## 2020-08-31 NOTE — H&P PST ADULT - HISTORY OF PRESENT ILLNESS
This is a 63 y/o female c/o hematuria,  sonogram revealed + kidney stones, she presents today for laser lithotripsy and stent placement.  COVID 19 PCR to be done  9/6  in [place]

## 2020-08-31 NOTE — H&P PST ADULT - NSICDXPASTMEDICALHX_GEN_ALL_CORE_FT
PAST MEDICAL HISTORY:  Hyperlipidemia     Hypertension     Vitamin D deficiency PAST MEDICAL HISTORY:  Hyperlipidemia     Hypertension     Obstructive sleep apnea with CPAP    Vitamin D deficiency

## 2020-09-01 PROBLEM — E78.5 HYPERLIPIDEMIA, UNSPECIFIED: Chronic | Status: ACTIVE | Noted: 2020-08-31

## 2020-09-01 PROBLEM — E55.9 VITAMIN D DEFICIENCY, UNSPECIFIED: Chronic | Status: ACTIVE | Noted: 2020-08-31

## 2020-09-01 PROBLEM — I10 ESSENTIAL (PRIMARY) HYPERTENSION: Chronic | Status: ACTIVE | Noted: 2020-08-31

## 2020-09-01 LAB
CULTURE RESULTS: SIGNIFICANT CHANGE UP
SPECIMEN SOURCE: SIGNIFICANT CHANGE UP

## 2020-09-03 ENCOUNTER — APPOINTMENT (OUTPATIENT)
Dept: UROLOGY | Facility: CLINIC | Age: 65
End: 2020-09-03
Payer: COMMERCIAL

## 2020-09-03 ENCOUNTER — OUTPATIENT (OUTPATIENT)
Dept: OUTPATIENT SERVICES | Facility: HOSPITAL | Age: 65
LOS: 1 days | End: 2020-09-03
Payer: COMMERCIAL

## 2020-09-03 ENCOUNTER — APPOINTMENT (OUTPATIENT)
Dept: CT IMAGING | Facility: IMAGING CENTER | Age: 65
End: 2020-09-03
Payer: COMMERCIAL

## 2020-09-03 VITALS — TEMPERATURE: 98.7 F

## 2020-09-03 DIAGNOSIS — Z98.891 HISTORY OF UTERINE SCAR FROM PREVIOUS SURGERY: Chronic | ICD-10-CM

## 2020-09-03 DIAGNOSIS — N20.1 CALCULUS OF URETER: ICD-10-CM

## 2020-09-03 DIAGNOSIS — R35.0 FREQUENCY OF MICTURITION: ICD-10-CM

## 2020-09-03 PROBLEM — G47.33 OBSTRUCTIVE SLEEP APNEA (ADULT) (PEDIATRIC): Chronic | Status: ACTIVE | Noted: 2020-08-31

## 2020-09-03 PROCEDURE — 99214 OFFICE O/P EST MOD 30 MIN: CPT | Mod: 25

## 2020-09-03 PROCEDURE — 76775 US EXAM ABDO BACK WALL LIM: CPT

## 2020-09-03 PROCEDURE — 74176 CT ABD & PELVIS W/O CONTRAST: CPT

## 2020-09-03 PROCEDURE — 74176 CT ABD & PELVIS W/O CONTRAST: CPT | Mod: 26

## 2020-09-03 PROCEDURE — 76775 US EXAM ABDO BACK WALL LIM: CPT | Mod: 26

## 2020-09-03 NOTE — ADDENDUM
[FreeTextEntry1] : Entered by Alexis Young, acting as scribe for Dr. Manuel Oleary.\par \par The documentation recorded by the scribe accurately reflects the service I personally performed and the decisions made by me.\par

## 2020-09-03 NOTE — LETTER BODY
[FreeTextEntry1] : Curtis Manning MD\par [address]\[place] [address]\par [phone]\par \par Dear Dr. Manning,\par \par Reason for visit: Gross hematuria. Left renal stone. Right ureteral stone.\par \par This is a 64 year-old woman with gross hematuria, a right ureteral stone, and left renal stone. Patient underwent CT urogram at [facility] demonstrating a left kidney stone measuring 4 mm and a right ureteral stone. Her cystoscopy and urine cytology in June were unremarkable. Renal ultrasound demonstrates mild fullness of the right renal pelvis and a slightly dilated proximal ureter. There is a 4.1 nonobstructing stone in the lower pole of the left kidney. She returns today for renal ultrasound. She continues to take Flomax and denies passing any stones. Patient denies flank pain or other urinary concerns. She is entirely asymptomatic. All other review of systems are negative. Past medical history, family history and social history were unchanged. Medications and allergies were reviewed. She has no known allergies to medication. \par \par On examination, the patient is a healthy-appearing woman in no acute distress. She is alert and oriented follows commands. She has normal mood and affect. She is normocephalic. Neck is supple. Oral no thrush. Respirations are unlabored. Abdomen is soft and nontender. Bladder is nonpalpable. No CVA tenderness. Neurologically she is grossly intact. No peripheral edema. Skin without gross abnormality.\par \par I personally reviewed ultrasound images with the patient today. Images demonstrated that previously visualized right dilated proximal ureter and fullness of renal pelvis appears to be resolved. Again visualized a non obstructing 4.0 mm punctuate calculus in the lower pole left kidney, stable in size. Both kidneys are normal in size and echogenicity without hydronephrosis or solid masses visualized. \par \par Assessment: Gross hematuria. Left kidney stone. Right ureteral stone.\par \par I counseled the patient. She is entirely asymptomatic. Her ultrasound today demonstrated no hydronephrosis and a left nonobstructing renal stone. Since she has not passed the stone, I discussed that her right ureteral stone is likely present. I recommended that the patient obtain a noncontrast CT abdomen and pelvis to determine presence of right ureteral stone. If no stone is visualized, then surgical intervention is not necessary. I recommend she hydrate frequently and continue taking Flomax for stone passage. I encouraged her to follow-up in 6 months to re-assess her symptoms. I also recommended that the patient obtain a urinalysis and urine culture today. Patient understands that if he develops gross hematuria or any urinary discomfort, he will contact me for further evaluation. I answered the patient's questions. The risks and expected outcomes were discussed. The patient will follow up as directed and contact me with any questions or concerns. Thank you for the opportunity to participate in the care of Ms. CASIANO. I will keep you updated on her progress.\par \par Plan: Continue Flomax. Noncontrast CT abdomen and pelvis. Urinalysis. Urine culture. \par \par I personally reviewed CT images with the patient today. Images demonstrated no evidence of right ureteral stone or hydronephrosis. Given no indication of obstructive right ureteral stones, patient will not require surgical intervention and will cancel her surgery. Risks and alternatives were discussed. I answered the patient questions. The patient will follow-up as directed and will contact me with any questions or concerns. \par \par Plan: Follow up in 6 months.

## 2020-09-05 LAB
APPEARANCE: CLEAR
BACTERIA UR CULT: NORMAL
BACTERIA: NEGATIVE
BILIRUBIN URINE: NEGATIVE
BLOOD URINE: NEGATIVE
COLOR: COLORLESS
GLUCOSE QUALITATIVE U: NEGATIVE
HYALINE CASTS: 0 /LPF
KETONES URINE: NEGATIVE
LEUKOCYTE ESTERASE URINE: NEGATIVE
MICROSCOPIC-UA: NORMAL
NITRITE URINE: NEGATIVE
PH URINE: 7.5
PROTEIN URINE: NEGATIVE
RED BLOOD CELLS URINE: 7 /HPF
SPECIFIC GRAVITY URINE: 1.01
SQUAMOUS EPITHELIAL CELLS: 0 /HPF
UROBILINOGEN URINE: NORMAL
WHITE BLOOD CELLS URINE: 0 /HPF

## 2020-09-06 ENCOUNTER — APPOINTMENT (OUTPATIENT)
Dept: DISASTER EMERGENCY | Facility: CLINIC | Age: 65
End: 2020-09-06

## 2020-09-09 ENCOUNTER — APPOINTMENT (OUTPATIENT)
Dept: UROLOGY | Facility: HOSPITAL | Age: 65
End: 2020-09-09

## 2020-09-12 DIAGNOSIS — J01.90 ACUTE SINUSITIS, UNSPECIFIED: ICD-10-CM

## 2020-09-12 DIAGNOSIS — L50.0 ALLERGIC URTICARIA: ICD-10-CM

## 2020-09-12 DIAGNOSIS — N20.1 CALCULUS OF URETER: ICD-10-CM

## 2020-09-12 DIAGNOSIS — L30.9 DERMATITIS, UNSPECIFIED: ICD-10-CM

## 2020-09-13 RX ORDER — HYDROCODONE BITARTRATE AND HOMATROPINE METHYLBROMIDE 5; 1.5 MG/5ML; MG/5ML
5-1.5 SYRUP ORAL
Qty: 120 | Refills: 0 | Status: DISCONTINUED | COMMUNITY
Start: 2020-01-10 | End: 2020-09-13

## 2020-09-13 RX ORDER — BENZONATATE 100 MG/1
100 CAPSULE ORAL
Qty: 1 | Refills: 3 | Status: DISCONTINUED | COMMUNITY
Start: 2020-02-11 | End: 2020-09-13

## 2020-09-13 RX ORDER — MOXIFLOXACIN HYDROCHLORIDE TABLETS, 400 MG 400 MG/1
400 TABLET, FILM COATED ORAL
Qty: 7 | Refills: 0 | Status: DISCONTINUED | COMMUNITY
Start: 2020-01-10 | End: 2020-09-13

## 2020-09-13 RX ORDER — METHYLPREDNISOLONE 4 MG/1
4 TABLET ORAL
Qty: 21 | Refills: 0 | Status: DISCONTINUED | COMMUNITY
Start: 2020-03-12 | End: 2020-09-13

## 2020-09-13 NOTE — PHYSICAL EXAM
[No Acute Distress] : no acute distress [Well Nourished] : well nourished [Well Developed] : well developed [Well-Appearing] : well-appearing [Normal Sclera/Conjunctiva] : normal sclera/conjunctiva [PERRL] : pupils equal round and reactive to light [EOMI] : extraocular movements intact [Normal Outer Ear/Nose] : the outer ears and nose were normal in appearance [No JVD] : no jugular venous distention [Normal Oropharynx] : the oropharynx was normal [No Lymphadenopathy] : no lymphadenopathy [Supple] : supple [No Respiratory Distress] : no respiratory distress  [Thyroid Normal, No Nodules] : the thyroid was normal and there were no nodules present [Clear to Auscultation] : lungs were clear to auscultation bilaterally [No Accessory Muscle Use] : no accessory muscle use [Normal Rate] : normal rate  [Regular Rhythm] : with a regular rhythm [Normal S1, S2] : normal S1 and S2 [No Murmur] : no murmur heard [No Varicosities] : no varicosities [No Carotid Bruits] : no carotid bruits [No Abdominal Bruit] : a ~M bruit was not heard ~T in the abdomen [No Edema] : there was no peripheral edema [No Extremity Clubbing/Cyanosis] : no extremity clubbing/cyanosis [No Palpable Aorta] : no palpable aorta [Pedal Pulses Present] : the pedal pulses are present [Non Tender] : non-tender [Soft] : abdomen soft [No HSM] : no HSM [No Masses] : no abdominal mass palpated [Non-distended] : non-distended [Normal Bowel Sounds] : normal bowel sounds [No CVA Tenderness] : no CVA  tenderness [Normal Anterior Cervical Nodes] : no anterior cervical lymphadenopathy [Normal Posterior Cervical Nodes] : no posterior cervical lymphadenopathy [No Spinal Tenderness] : no spinal tenderness [No Joint Swelling] : no joint swelling [Grossly Normal Strength/Tone] : grossly normal strength/tone [Coordination Grossly Intact] : coordination grossly intact [No Rash] : no rash [No Focal Deficits] : no focal deficits [Normal Affect] : the affect was normal [Normal Gait] : normal gait [Normal Insight/Judgement] : insight and judgment were intact

## 2020-09-16 ENCOUNTER — APPOINTMENT (OUTPATIENT)
Dept: INTERNAL MEDICINE | Facility: CLINIC | Age: 65
End: 2020-09-16
Payer: COMMERCIAL

## 2020-09-16 VITALS
DIASTOLIC BLOOD PRESSURE: 70 MMHG | BODY MASS INDEX: 29.94 KG/M2 | WEIGHT: 169 LBS | SYSTOLIC BLOOD PRESSURE: 110 MMHG | HEART RATE: 72 BPM | RESPIRATION RATE: 14 BRPM

## 2020-09-16 DIAGNOSIS — Z01.818 ENCOUNTER FOR OTHER PREPROCEDURAL EXAMINATION: ICD-10-CM

## 2020-09-16 DIAGNOSIS — Z87.09 PERSONAL HISTORY OF OTHER DISEASES OF THE RESPIRATORY SYSTEM: ICD-10-CM

## 2020-09-16 DIAGNOSIS — B96.89 PERSONAL HISTORY OF OTHER DISEASES OF THE RESPIRATORY SYSTEM: ICD-10-CM

## 2020-09-16 PROCEDURE — 90732 PPSV23 VACC 2 YRS+ SUBQ/IM: CPT

## 2020-09-16 PROCEDURE — G0009: CPT

## 2020-09-16 PROCEDURE — 90472 IMMUNIZATION ADMIN EACH ADD: CPT

## 2020-09-16 PROCEDURE — G0439: CPT | Mod: 25

## 2020-09-16 PROCEDURE — 90662 IIV NO PRSV INCREASED AG IM: CPT

## 2020-09-16 PROCEDURE — G0444 DEPRESSION SCREEN ANNUAL: CPT

## 2020-09-16 PROCEDURE — G0442 ANNUAL ALCOHOL SCREEN 15 MIN: CPT

## 2020-09-16 NOTE — HEALTH RISK ASSESSMENT
[Excellent] : ~his/her~  mood as  excellent [Monthly or less (1 pt)] : Monthly or less (1 point) [Yes] : Yes [1 or 2 (0 pts)] : 1 or 2 (0 points) [No] : In the past 12 months have you used drugs other than those required for medical reasons? No [Never (0 pts)] : Never (0 points) [No falls in past year] : Patient reported no falls in the past year [0] : 1) Little interest or pleasure doing things: Not at all (0) [With Family] : lives with family [None] : None [] :  [Feels Safe at Home] : Feels safe at home [Employed] : employed [Fully functional (using the telephone, shopping, preparing meals, housekeeping, doing laundry, using] : Fully functional and needs no help or supervision to perform IADLs (using the telephone, shopping, preparing meals, housekeeping, doing laundry, using transportation, managing medications and managing finances) [Fully functional (bathing, dressing, toileting, transferring, walking, feeding)] : Fully functional (bathing, dressing, toileting, transferring, walking, feeding) [Reports normal functional visual acuity (ie: able to read med bottle)] : Reports normal functional visual acuity [Smoke Detector] : smoke detector [Seat Belt] :  uses seat belt [] : No [[id number]] : 0 [Audit-CScore] : 1 [Change in mental status noted] : No change in mental status noted [Language] : denies difficulty with language [Behavior] : denies difficulty with behavior [Learning/Retaining New Information] : denies difficulty learning/retaining new information [Handling Complex Tasks] : denies difficulty handling complex tasks [Reasoning] : denies difficulty with reasoning [Spatial Ability and Orientation] : denies difficulty with spatial ability and orientation [Reports changes in hearing] : Reports no changes in hearing [Reports changes in vision] : Reports no changes in vision [Reports changes in dental health] : Reports no changes in dental health

## 2020-09-16 NOTE — HISTORY OF PRESENT ILLNESS
[FreeTextEntry1] : Ms. CASIANO is here for an annual physical examination and assessment\par  [de-identified] : She generally feels well with no specific complaints. Her recent health has been good.\par Generally well, other than ongoing EASON.  Never followed with pulmonary. \par Was to have a uretal lithectomy, but seems to have spontaneously passed.  Follows with urology.  Keeps self well hydrated. \par \par Denies headache, dizziness.\par Denies rash, fatigue, fever, weight loss, anorexia.\par Denies cough, wheezing.\par Denies CP, edema, palpitations.\par Denies abdominal pain, N/V/D/C. Denies BRBPR, melena, dysphagia.\par Denies dysuria, frequency, hematuria, hesitancy.\par Denies weakness, numbness, gait instability.\par

## 2020-09-24 ENCOUNTER — NON-APPOINTMENT (OUTPATIENT)
Age: 65
End: 2020-09-24

## 2020-09-24 ENCOUNTER — APPOINTMENT (OUTPATIENT)
Dept: OPHTHALMOLOGY | Facility: CLINIC | Age: 65
End: 2020-09-24
Payer: COMMERCIAL

## 2020-09-24 PROCEDURE — 92133 CPTRZD OPH DX IMG PST SGM ON: CPT

## 2020-09-24 PROCEDURE — 92014 COMPRE OPH EXAM EST PT 1/>: CPT

## 2020-09-24 PROCEDURE — 92015 DETERMINE REFRACTIVE STATE: CPT

## 2020-10-30 ENCOUNTER — RESULT REVIEW (OUTPATIENT)
Age: 65
End: 2020-10-30

## 2020-10-30 ENCOUNTER — APPOINTMENT (OUTPATIENT)
Dept: RADIOLOGY | Facility: CLINIC | Age: 65
End: 2020-10-30
Payer: COMMERCIAL

## 2020-10-30 ENCOUNTER — OUTPATIENT (OUTPATIENT)
Dept: OUTPATIENT SERVICES | Facility: HOSPITAL | Age: 65
LOS: 1 days | End: 2020-10-30
Payer: COMMERCIAL

## 2020-10-30 DIAGNOSIS — M25.50 PAIN IN UNSPECIFIED JOINT: ICD-10-CM

## 2020-10-30 DIAGNOSIS — Z98.891 HISTORY OF UTERINE SCAR FROM PREVIOUS SURGERY: Chronic | ICD-10-CM

## 2020-10-30 PROCEDURE — 73564 X-RAY EXAM KNEE 4 OR MORE: CPT | Mod: 26,50

## 2020-10-30 PROCEDURE — 73100 X-RAY EXAM OF WRIST: CPT

## 2020-10-30 PROCEDURE — 73564 X-RAY EXAM KNEE 4 OR MORE: CPT

## 2020-10-30 PROCEDURE — 73100 X-RAY EXAM OF WRIST: CPT | Mod: 26,50

## 2020-12-04 ENCOUNTER — APPOINTMENT (OUTPATIENT)
Dept: RHEUMATOLOGY | Facility: CLINIC | Age: 65
End: 2020-12-04
Payer: COMMERCIAL

## 2020-12-04 VITALS
RESPIRATION RATE: 14 BRPM | HEART RATE: 78 BPM | WEIGHT: 167 LBS | TEMPERATURE: 98 F | BODY MASS INDEX: 29.59 KG/M2 | OXYGEN SATURATION: 97 % | HEIGHT: 63 IN | DIASTOLIC BLOOD PRESSURE: 79 MMHG | SYSTOLIC BLOOD PRESSURE: 122 MMHG

## 2020-12-04 PROCEDURE — 99204 OFFICE O/P NEW MOD 45 MIN: CPT

## 2020-12-04 PROCEDURE — 99072 ADDL SUPL MATRL&STAF TM PHE: CPT

## 2020-12-07 DIAGNOSIS — R70.0 ELEVATED ERYTHROCYTE SEDIMENTATION RATE: ICD-10-CM

## 2020-12-07 LAB
25(OH)D3 SERPL-MCNC: 68 NG/ML
ALBUMIN SERPL ELPH-MCNC: 4.4 G/DL
ALP BLD-CCNC: 107 U/L
ALT SERPL-CCNC: 15 U/L
ANION GAP SERPL CALC-SCNC: 12 MMOL/L
AST SERPL-CCNC: 18 U/L
BASOPHILS # BLD AUTO: 0.03 K/UL
BASOPHILS NFR BLD AUTO: 0.5 %
BILIRUB SERPL-MCNC: 0.3 MG/DL
BUN SERPL-MCNC: 15 MG/DL
CALCIUM SERPL-MCNC: 10.1 MG/DL
CCP AB SER IA-ACNC: 8 UNITS
CHLORIDE SERPL-SCNC: 103 MMOL/L
CO2 SERPL-SCNC: 26 MMOL/L
CREAT SERPL-MCNC: 0.64 MG/DL
CRP SERPL-MCNC: 0.18 MG/DL
EOSINOPHIL # BLD AUTO: 0.05 K/UL
EOSINOPHIL NFR BLD AUTO: 0.8 %
ERYTHROCYTE [SEDIMENTATION RATE] IN BLOOD BY WESTERGREN METHOD: 58 MM/HR
HCT VFR BLD CALC: 46.1 %
HGB BLD-MCNC: 13.9 G/DL
IMM GRANULOCYTES NFR BLD AUTO: 0.2 %
LYMPHOCYTES # BLD AUTO: 1.64 K/UL
LYMPHOCYTES NFR BLD AUTO: 26.6 %
MAN DIFF?: NORMAL
MCHC RBC-ENTMCNC: 28.6 PG
MCHC RBC-ENTMCNC: 30.2 GM/DL
MCV RBC AUTO: 94.9 FL
MONOCYTES # BLD AUTO: 0.46 K/UL
MONOCYTES NFR BLD AUTO: 7.5 %
NEUTROPHILS # BLD AUTO: 3.97 K/UL
NEUTROPHILS NFR BLD AUTO: 64.4 %
PLATELET # BLD AUTO: 249 K/UL
POTASSIUM SERPL-SCNC: 4.8 MMOL/L
PROT SERPL-MCNC: 7.4 G/DL
RBC # BLD: 4.86 M/UL
RBC # FLD: 12.4 %
RF+CCP IGG SER-IMP: NEGATIVE
RHEUMATOID FACT SER QL: <10 IU/ML
SODIUM SERPL-SCNC: 142 MMOL/L
TSH SERPL-ACNC: 0.64 UIU/ML
WBC # FLD AUTO: 6.16 K/UL

## 2021-01-10 ENCOUNTER — TRANSCRIPTION ENCOUNTER (OUTPATIENT)
Age: 66
End: 2021-01-10

## 2021-01-11 ENCOUNTER — APPOINTMENT (OUTPATIENT)
Dept: RHEUMATOLOGY | Facility: CLINIC | Age: 66
End: 2021-01-11

## 2021-01-11 LAB — SARS-COV-2 N GENE NPH QL NAA+PROBE: NOT DETECTED

## 2021-01-20 LAB
SARS-COV-2 IGG SERPL IA-ACNC: 0.07 INDEX
SARS-COV-2 IGG SERPL QL IA: NEGATIVE

## 2021-01-22 LAB — SARS-COV-2 N GENE NPH QL NAA+PROBE: DETECTED

## 2021-01-31 ENCOUNTER — EMERGENCY (EMERGENCY)
Facility: HOSPITAL | Age: 66
LOS: 1 days | Discharge: HOME | End: 2021-01-31
Attending: EMERGENCY MEDICINE
Payer: COMMERCIAL

## 2021-01-31 VITALS
RESPIRATION RATE: 20 BRPM | HEART RATE: 94 BPM | DIASTOLIC BLOOD PRESSURE: 68 MMHG | SYSTOLIC BLOOD PRESSURE: 118 MMHG | TEMPERATURE: 99 F | OXYGEN SATURATION: 98 %

## 2021-01-31 VITALS
TEMPERATURE: 99 F | WEIGHT: 158.07 LBS | HEART RATE: 112 BPM | DIASTOLIC BLOOD PRESSURE: 86 MMHG | SYSTOLIC BLOOD PRESSURE: 136 MMHG | RESPIRATION RATE: 23 BRPM | HEIGHT: 63.5 IN | OXYGEN SATURATION: 96 %

## 2021-01-31 DIAGNOSIS — Z98.891 HISTORY OF UTERINE SCAR FROM PREVIOUS SURGERY: Chronic | ICD-10-CM

## 2021-01-31 LAB
ALBUMIN SERPL ELPH-MCNC: 3.9 G/DL — SIGNIFICANT CHANGE UP (ref 3.3–5)
ALP SERPL-CCNC: 98 U/L — SIGNIFICANT CHANGE UP (ref 40–120)
ALT FLD-CCNC: 24 U/L — SIGNIFICANT CHANGE UP (ref 10–45)
ANION GAP SERPL CALC-SCNC: 13 MMOL/L — SIGNIFICANT CHANGE UP (ref 5–17)
AST SERPL-CCNC: 28 U/L — SIGNIFICANT CHANGE UP (ref 10–40)
BASOPHILS # BLD AUTO: 0.01 K/UL — SIGNIFICANT CHANGE UP (ref 0–0.2)
BASOPHILS NFR BLD AUTO: 0.2 % — SIGNIFICANT CHANGE UP (ref 0–2)
BILIRUB SERPL-MCNC: 0.6 MG/DL — SIGNIFICANT CHANGE UP (ref 0.2–1.2)
BUN SERPL-MCNC: 6 MG/DL — LOW (ref 7–23)
CALCIUM SERPL-MCNC: 9.1 MG/DL — SIGNIFICANT CHANGE UP (ref 8.4–10.5)
CHLORIDE SERPL-SCNC: 98 MMOL/L — SIGNIFICANT CHANGE UP (ref 96–108)
CO2 SERPL-SCNC: 26 MMOL/L — SIGNIFICANT CHANGE UP (ref 22–31)
CREAT SERPL-MCNC: 0.67 MG/DL — SIGNIFICANT CHANGE UP (ref 0.5–1.3)
CRP SERPL-MCNC: 13.25 MG/DL — HIGH (ref 0–0.4)
D DIMER BLD IA.RAPID-MCNC: 202 NG/ML DDU — SIGNIFICANT CHANGE UP
EOSINOPHIL # BLD AUTO: 0 K/UL — SIGNIFICANT CHANGE UP (ref 0–0.5)
EOSINOPHIL NFR BLD AUTO: 0 % — SIGNIFICANT CHANGE UP (ref 0–6)
FERRITIN SERPL-MCNC: 849 NG/ML — HIGH (ref 15–150)
GLUCOSE SERPL-MCNC: 122 MG/DL — HIGH (ref 70–99)
HCT VFR BLD CALC: 44.7 % — SIGNIFICANT CHANGE UP (ref 34.5–45)
HGB BLD-MCNC: 14.4 G/DL — SIGNIFICANT CHANGE UP (ref 11.5–15.5)
IMM GRANULOCYTES NFR BLD AUTO: 0.3 % — SIGNIFICANT CHANGE UP (ref 0–1.5)
LYMPHOCYTES # BLD AUTO: 0.57 K/UL — LOW (ref 1–3.3)
LYMPHOCYTES # BLD AUTO: 9.6 % — LOW (ref 13–44)
MCHC RBC-ENTMCNC: 28.9 PG — SIGNIFICANT CHANGE UP (ref 27–34)
MCHC RBC-ENTMCNC: 32.2 GM/DL — SIGNIFICANT CHANGE UP (ref 32–36)
MCV RBC AUTO: 89.6 FL — SIGNIFICANT CHANGE UP (ref 80–100)
MONOCYTES # BLD AUTO: 0.61 K/UL — SIGNIFICANT CHANGE UP (ref 0–0.9)
MONOCYTES NFR BLD AUTO: 10.3 % — SIGNIFICANT CHANGE UP (ref 2–14)
NEUTROPHILS # BLD AUTO: 4.73 K/UL — SIGNIFICANT CHANGE UP (ref 1.8–7.4)
NEUTROPHILS NFR BLD AUTO: 79.6 % — HIGH (ref 43–77)
NRBC # BLD: 0 /100 WBCS — SIGNIFICANT CHANGE UP (ref 0–0)
PLATELET # BLD AUTO: 233 K/UL — SIGNIFICANT CHANGE UP (ref 150–400)
POTASSIUM SERPL-MCNC: 3.7 MMOL/L — SIGNIFICANT CHANGE UP (ref 3.5–5.3)
POTASSIUM SERPL-SCNC: 3.7 MMOL/L — SIGNIFICANT CHANGE UP (ref 3.5–5.3)
PROCALCITONIN SERPL-MCNC: 0.04 NG/ML — SIGNIFICANT CHANGE UP (ref 0.02–0.1)
PROT SERPL-MCNC: 8 G/DL — SIGNIFICANT CHANGE UP (ref 6–8.3)
RBC # BLD: 4.99 M/UL — SIGNIFICANT CHANGE UP (ref 3.8–5.2)
RBC # FLD: 11.8 % — SIGNIFICANT CHANGE UP (ref 10.3–14.5)
SARS-COV-2 RNA SPEC QL NAA+PROBE: DETECTED
SODIUM SERPL-SCNC: 137 MMOL/L — SIGNIFICANT CHANGE UP (ref 135–145)
WBC # BLD: 5.94 K/UL — SIGNIFICANT CHANGE UP (ref 3.8–10.5)
WBC # FLD AUTO: 5.94 K/UL — SIGNIFICANT CHANGE UP (ref 3.8–10.5)

## 2021-01-31 PROCEDURE — 84484 ASSAY OF TROPONIN QUANT: CPT

## 2021-01-31 PROCEDURE — 84145 PROCALCITONIN (PCT): CPT

## 2021-01-31 PROCEDURE — 71045 X-RAY EXAM CHEST 1 VIEW: CPT

## 2021-01-31 PROCEDURE — U0003: CPT

## 2021-01-31 PROCEDURE — 99285 EMERGENCY DEPT VISIT HI MDM: CPT

## 2021-01-31 PROCEDURE — 85025 COMPLETE CBC W/AUTO DIFF WBC: CPT

## 2021-01-31 PROCEDURE — 94640 AIRWAY INHALATION TREATMENT: CPT

## 2021-01-31 PROCEDURE — 99284 EMERGENCY DEPT VISIT MOD MDM: CPT | Mod: 25

## 2021-01-31 PROCEDURE — 82728 ASSAY OF FERRITIN: CPT

## 2021-01-31 PROCEDURE — 85379 FIBRIN DEGRADATION QUANT: CPT

## 2021-01-31 PROCEDURE — 80053 COMPREHEN METABOLIC PANEL: CPT

## 2021-01-31 PROCEDURE — 86140 C-REACTIVE PROTEIN: CPT

## 2021-01-31 PROCEDURE — 93005 ELECTROCARDIOGRAM TRACING: CPT

## 2021-01-31 PROCEDURE — U0005: CPT

## 2021-01-31 PROCEDURE — 71045 X-RAY EXAM CHEST 1 VIEW: CPT | Mod: 26

## 2021-01-31 RX ORDER — SODIUM CHLORIDE 9 MG/ML
500 INJECTION, SOLUTION INTRAVENOUS ONCE
Refills: 0 | Status: COMPLETED | OUTPATIENT
Start: 2021-01-31 | End: 2021-01-31

## 2021-01-31 RX ORDER — ALBUTEROL 90 UG/1
2 AEROSOL, METERED ORAL ONCE
Refills: 0 | Status: COMPLETED | OUTPATIENT
Start: 2021-01-31 | End: 2021-01-31

## 2021-01-31 RX ADMIN — SODIUM CHLORIDE 500 MILLILITER(S): 9 INJECTION, SOLUTION INTRAVENOUS at 11:06

## 2021-01-31 RX ADMIN — ALBUTEROL 2 PUFF(S): 90 AEROSOL, METERED ORAL at 11:06

## 2021-01-31 NOTE — ED PROVIDER NOTE - CLINICAL SUMMARY MEDICAL DECISION MAKING FREE TEXT BOX
COVID PNA, worsening symptoms though not hypoxic or significantly SOB on exertion.  Labs, fluids, tylenol.  If dimer markedly elevated will check for PE.  Hopeful d/c c [facility] f/u. --BMM

## 2021-01-31 NOTE — ED ADULT NURSE NOTE - OBJECTIVE STATEMENT
65y Female A&Ox3 came to ED c/o diarrhea. PMH of HLD, HTN, Acid reflux. Pt was tested Positive for COVID 10 days ago with no symptoms but 3 days ago, Pt started to have non bloody diarrhea and cough with little production. no fevers, chills, vomiting, loss of sense of taste or smell. Pt presents with 100.0 temp, cough, no chills, ABD pain. Denies chest pain, sob, ha, n/v, abdominal pain, urinary symptoms, hematuria. Upon examination, full facial symmetry, no JVD or trach deviation, lung sounds clear and adequate chest rise, S1 and S2 heart sounds present, +2 pulses in all extremities with full ROM and equal strength, cap refill <2 seconds, ABD soft, non distended and non tender, pelvis intact. 65y Female A&Ox3 came to ED c/o diarrhea. PMH of HLD, HTN, Acid reflux. Pt was tested Positive for COVID 10 days ago with no symptoms but 3 days ago, Pt started to have non bloody diarrhea and cough with little production. no fevers, chills, vomiting, loss of sense of taste or smell. Pt today had increased generalized weakness. Pt presents with weakness, 100.0 temp, cough, SPO2 97% room air, no chills, ABD pain. Denies chest pain, sob, ha, n/v, abdominal pain, urinary symptoms, hematuria. Upon examination, full facial symmetry, no JVD or trach deviation, lung sounds clear and adequate chest rise, S1 and S2 heart sounds present, +2 pulses in all extremities with full ROM and equal strength, cap refill <2 seconds, ABD soft, non distended and non tender, pelvis intact.

## 2021-01-31 NOTE — ED PROVIDER NOTE - OBJECTIVE STATEMENT
65 y F h/o HTN, hld, COVID+ outpt (sick contact is ) c progressively worsening EASON/SOB, cough, malaise, weakness, diarrhea x 2 yesterday.  Started developing some CP worse c coughing yesterday.  No symptoms at rest.  Feels congested.  Cough is nonproductive and no blood in diarrhea.  No travel.  Not taking otc medications for symptoms.

## 2021-01-31 NOTE — ED PROVIDER NOTE - PATIENT PORTAL LINK FT
You can access the FollowMyHealth Patient Portal offered by [facility] by registering at the following website: [email or web address]. By joining [facility]’s FollowMyHealth portal, you will also be able to view your health information using other applications (apps) compatible with our system.

## 2021-01-31 NOTE — ED ADULT NURSE NOTE - CAS EDN DISCHARGE ASSESSMENT
Pt to be called by [facility] for Monticlonal Antibody treatment, Pt to follow up with PCP in 3-5 days. Pt discharged with steady gait to waiting room./Alert and oriented to person, place and time

## 2021-01-31 NOTE — ED PROVIDER NOTE - PHYSICAL EXAMINATION
GENERAL: AAOx4, GCS 15, NAD, WDWN; HEENT: MMM, no jugular venous distension, supple neck, PERRLA, EOMI, nonicteric sclera; PULM: scattered expiratory wheezes B/L, no crackles/rubs/rales; CV: No tachypnea.  RRR, S1S2, no MRG; ABD: Flat abdomen, NTND, no R/G/R, no CVAT.  MSK: MARTINEZ, +2 pulses x4;  NEURO: No obvious focal deficits; PSYCH: AAOx3, clear thought and normal sensorium.      No LE edema.  No jugular venous distension  Ambulatory SaO2 93%  RR 12--> 20

## 2021-01-31 NOTE — ED PROVIDER NOTE - NSFOLLOWUPINSTRUCTIONS_ED_ALL_ED_FT
Follow up with Dr. Manning in 3-5 days    Return to the ER for worsening shortness of breath/difficulty breathing, vomiting, or ANY other concerns    Tylenol 500 mg (one extra strength pill) once every 6 hours for fever    You will be contacted by a [facility] representative in the next 24-48 hours to arrange for Monoclonal Antibody infusion.  If you do not hear from our staff please call [phone].

## 2021-02-01 ENCOUNTER — TRANSCRIPTION ENCOUNTER (OUTPATIENT)
Age: 66
End: 2021-02-01

## 2021-02-03 ENCOUNTER — OUTPATIENT (OUTPATIENT)
Dept: INPATIENT UNIT | Facility: HOSPITAL | Age: 66
LOS: 1 days | End: 2021-02-03
Payer: COMMERCIAL

## 2021-02-03 ENCOUNTER — APPOINTMENT (OUTPATIENT)
Dept: DISASTER EMERGENCY | Facility: HOSPITAL | Age: 66
End: 2021-02-03

## 2021-02-03 VITALS
WEIGHT: 158.07 LBS | TEMPERATURE: 98 F | DIASTOLIC BLOOD PRESSURE: 76 MMHG | RESPIRATION RATE: 18 BRPM | HEIGHT: 63 IN | OXYGEN SATURATION: 96 % | HEART RATE: 97 BPM | SYSTOLIC BLOOD PRESSURE: 131 MMHG

## 2021-02-03 VITALS
OXYGEN SATURATION: 95 % | TEMPERATURE: 99 F | RESPIRATION RATE: 18 BRPM | HEART RATE: 84 BPM | SYSTOLIC BLOOD PRESSURE: 136 MMHG | DIASTOLIC BLOOD PRESSURE: 78 MMHG

## 2021-02-03 DIAGNOSIS — Z98.891 HISTORY OF UTERINE SCAR FROM PREVIOUS SURGERY: Chronic | ICD-10-CM

## 2021-02-03 DIAGNOSIS — U07.1 COVID-19: ICD-10-CM

## 2021-02-03 PROCEDURE — M0239: CPT

## 2021-02-03 RX ORDER — SODIUM CHLORIDE 9 MG/ML
250 INJECTION INTRAMUSCULAR; INTRAVENOUS; SUBCUTANEOUS
Refills: 0 | Status: DISCONTINUED | OUTPATIENT
Start: 2021-02-03 | End: 2021-02-17

## 2021-02-03 RX ORDER — BAMLANIVIMAB 35 MG/ML
700 INJECTION, SOLUTION INTRAVENOUS ONCE
Refills: 0 | Status: COMPLETED | OUTPATIENT
Start: 2021-02-03 | End: 2021-02-03

## 2021-02-03 RX ADMIN — SODIUM CHLORIDE 25 MILLILITER(S): 9 INJECTION INTRAMUSCULAR; INTRAVENOUS; SUBCUTANEOUS at 09:59

## 2021-02-03 RX ADMIN — BAMLANIVIMAB 270 MILLIGRAM(S): 35 INJECTION, SOLUTION INTRAVENOUS at 09:59

## 2021-02-03 NOTE — CHART NOTE - NSCHARTNOTEFT_GEN_A_CORE
CC: Monoclonal Antibody Infusion/COVID 19 Positive  65yFemale with PMHx hypertension, high cholesterol and symptoms of dry cough, sob, body aches, diarrhea since 1/27, tested pos for covid 19 on 1/31    exam/findings:  T(C): 36.8 (02-03-21 @ 10:15), Max: 36.9 (02-03-21 @ 09:36)  HR: 88 (02-03-21 @ 10:15) (88 - 100)  BP: 130/72 (02-03-21 @ 10:15) (130/72 - 137/74)  RR: 18 (02-03-21 @ 10:15) (18 - 18)  SpO2: 98% (02-03-21 @ 10:15) (94% - 98%)      PE: sitting in chair  Appearance: NAD	  HEENT:   Normal oral mucosa,   Cardiovascular: Normal S1 S2, No JVD, No murmurs, No edema  Respiratory: Lungs clear to auscultation	  Gastrointestinal:  Soft, Non-tender  Skin: warm and dry  Neurologic: Non-focal  Extremities: Normal range of motion, no calf tenderness or edema    ASSESSMENT:  Pt is a 66 yo female,   Covid 19 Positive,   referred by Dr. Ward Lawson who presents to infusion center for Monoclonal antibody infusion of Bamlanivimab  Symptoms/ Criteria: dry cough, sob, body aches, diarrhea since 1/27  Risk Profile includes: hypertension, age 65    PLAN:  - infusion procedure explained to patient   -Consent for monoclonal antibody infusion obtained   - Risk & benifits discussed/all questions answered  -infuse   Bamlanivimab 700mg IV over one hour   -observe patient for one hour post infusion        I have reviewed the Bamlanivimab Emergency Use Authorization (EAU) and I have provided the patient or patient's caregiver with the following information:  1. FDA has authorized emergency use of Bamlanivimab, which is not FDA-approved biologic product.  2. The patient or patient's caregiver has the option to accept or refuse administration of Bamlanivimab  3. The significant known and benefits are unknown.  4. Information on available alternative treatments and risks and benefits of those alternatives.    Discharge:  Patient tolerated infusion well denies complaints of chest pain/SOB/dizziness/ palps  Vital signs --- for discharge home ---  D/C instructions given/ fact sheet included.  Patient to follow-up with PCP as needed. CC: Monoclonal Antibody Infusion/COVID 19 Positive  65yFemale with PMHx hypertension, high cholesterol and symptoms of dry cough, sob, body aches, diarrhea since 1/27, tested pos for covid 19 on 1/31    exam/findings:  T(C): 36.8 (02-03-21 @ 10:15), Max: 36.9 (02-03-21 @ 09:36)  HR: 88 (02-03-21 @ 10:15) (88 - 100)  BP: 130/72 (02-03-21 @ 10:15) (130/72 - 137/74)  RR: 18 (02-03-21 @ 10:15) (18 - 18)  SpO2: 98% (02-03-21 @ 10:15) (94% - 98%)      PE: sitting in chair  Appearance: NAD	  HEENT:   Normal oral mucosa,   Cardiovascular: Normal S1 S2, No JVD, No murmurs, No edema  Respiratory: Lungs clear to auscultation	  Gastrointestinal:  Soft, Non-tender  Skin: warm and dry  Neurologic: Non-focal  Extremities: Normal range of motion, no calf tenderness or edema    ASSESSMENT:  Pt is a 64 yo female,   Covid 19 Positive,   referred by Dr. Ward Lawson who presents to infusion center for Monoclonal antibody infusion of Bamlanivimab  Symptoms/ Criteria: dry cough, sob, body aches, diarrhea since 1/27  Risk Profile includes: hypertension, age 65    PLAN:  - infusion procedure explained to patient   -Consent for monoclonal antibody infusion obtained   - Risk & benifits discussed/all questions answered  -infuse   Bamlanivimab 700mg IV over one hour   -observe patient for one hour post infusion        I have reviewed the Bamlanivimab Emergency Use Authorization (EAU) and I have provided the patient or patient's caregiver with the following information:  1. FDA has authorized emergency use of Bamlanivimab, which is not FDA-approved biologic product.  2. The patient or patient's caregiver has the option to accept or refuse administration of Bamlanivimab  3. The significant known and benefits are unknown.  4. Information on available alternative treatments and risks and benefits of those alternatives.    Discharge:  Patient tolerated infusion well denies complaints of chest pain/SOB/dizziness/ palps  Vital signs stable for discharge home 12:05pm  D/C instructions given/ fact sheet included.  Patient to follow-up with PCP as needed.

## 2021-02-04 ENCOUNTER — TRANSCRIPTION ENCOUNTER (OUTPATIENT)
Age: 66
End: 2021-02-04

## 2021-02-25 ENCOUNTER — APPOINTMENT (OUTPATIENT)
Dept: INTERNAL MEDICINE | Facility: CLINIC | Age: 66
End: 2021-02-25
Payer: COMMERCIAL

## 2021-02-25 VITALS
BODY MASS INDEX: 27.99 KG/M2 | OXYGEN SATURATION: 97 % | SYSTOLIC BLOOD PRESSURE: 122 MMHG | HEART RATE: 93 BPM | DIASTOLIC BLOOD PRESSURE: 64 MMHG | WEIGHT: 158 LBS

## 2021-02-25 PROCEDURE — 99072 ADDL SUPL MATRL&STAF TM PHE: CPT

## 2021-02-25 PROCEDURE — 99214 OFFICE O/P EST MOD 30 MIN: CPT

## 2021-02-28 NOTE — REVIEW OF SYSTEMS
[Fever] : no fever [Fatigue] : fatigue [Chest Pain] : no chest pain [Palpitations] : no palpitations [Orthopnea] : orthopnea [Shortness Of Breath] : shortness of breath [Wheezing] : wheezing

## 2021-02-28 NOTE — HISTORY OF PRESENT ILLNESS
[FreeTextEntry8] : 64 yo female with a recent COVID infection here with continued SOB.  On 2/3 she received monoclonal Ab\par \par Still with chest discomfort and clearing of throat\par \par SOB the whole day the last 3 days but today better.\par \par Has been using an inhaler.  Does not get an instant relief. Has been using every 12 hours.

## 2021-02-28 NOTE — ASSESSMENT
[FreeTextEntry1] : 64 yo female with a h/o asthma and recent COVID infection still with significant SOB.  Will refer to Pulmonary through [facility].  Will check a D-dimer and send for a CT scan of the chest.  Further management pending above.

## 2021-02-28 NOTE — PHYSICAL EXAM
[No Acute Distress] : no acute distress [Well Nourished] : well nourished [Well Developed] : well developed [Clear to Auscultation] : lungs were clear to auscultation bilaterally [Normal Rate] : normal rate  [Normal S1, S2] : normal S1 and S2 [No Edema] : there was no peripheral edema [Soft] : abdomen soft [Non Tender] : non-tender [Non-distended] : non-distended [No Masses] : no abdominal mass palpated [No HSM] : no HSM [de-identified] : Had to stop in between sentences to take a breath.  Some accessory muscle use.

## 2021-03-02 ENCOUNTER — APPOINTMENT (OUTPATIENT)
Dept: CT IMAGING | Facility: CLINIC | Age: 66
End: 2021-03-02
Payer: COMMERCIAL

## 2021-03-02 ENCOUNTER — OUTPATIENT (OUTPATIENT)
Dept: OUTPATIENT SERVICES | Facility: HOSPITAL | Age: 66
LOS: 1 days | End: 2021-03-02
Payer: COMMERCIAL

## 2021-03-02 DIAGNOSIS — Z98.891 HISTORY OF UTERINE SCAR FROM PREVIOUS SURGERY: Chronic | ICD-10-CM

## 2021-03-02 DIAGNOSIS — U07.1 COVID-19: ICD-10-CM

## 2021-03-02 PROCEDURE — 71250 CT THORAX DX C-: CPT | Mod: 26

## 2021-03-02 PROCEDURE — 71250 CT THORAX DX C-: CPT

## 2021-03-08 ENCOUNTER — APPOINTMENT (OUTPATIENT)
Dept: PULMONOLOGY | Facility: CLINIC | Age: 66
End: 2021-03-08
Payer: COMMERCIAL

## 2021-03-08 VITALS
HEIGHT: 63 IN | HEART RATE: 84 BPM | WEIGHT: 159 LBS | OXYGEN SATURATION: 98 % | RESPIRATION RATE: 16 BRPM | BODY MASS INDEX: 28.17 KG/M2 | SYSTOLIC BLOOD PRESSURE: 126 MMHG | TEMPERATURE: 97.3 F | DIASTOLIC BLOOD PRESSURE: 74 MMHG

## 2021-03-08 DIAGNOSIS — U07.1 COVID-19: ICD-10-CM

## 2021-03-08 LAB — ERYTHROCYTE [SEDIMENTATION RATE] IN BLOOD BY WESTERGREN METHOD: 46 MM/HR

## 2021-03-08 PROCEDURE — 71046 X-RAY EXAM CHEST 2 VIEWS: CPT

## 2021-03-08 PROCEDURE — 99204 OFFICE O/P NEW MOD 45 MIN: CPT | Mod: 25

## 2021-03-08 PROCEDURE — 99072 ADDL SUPL MATRL&STAF TM PHE: CPT

## 2021-03-08 RX ORDER — CEFADROXIL 500 MG/1
500 CAPSULE ORAL TWICE DAILY
Qty: 14 | Refills: 0 | Status: DISCONTINUED | COMMUNITY
Start: 2021-01-06 | End: 2021-03-08

## 2021-03-08 NOTE — HISTORY OF PRESENT ILLNESS
[TextBox_4] : Ms. CASIANO is a 65 year old female presenting to the office today for initial pulmonary evaluation. Her chief complaint is s/p COVID PNA with PND. \par \par - she states she is an  working from home\par - she states in jan 20th 2021 she tested positive for COVID and got sx on 1/24/21, received MAB on feb 3rd 2021. \par - she did not have fever but received an artovent inhaler to ease flu- like symptoms by Dr Manning\par - she states Dr. Manning changed her inhaler to \par - she endorses a dry cough and PND with clear discharge \par - she states she took mucinex and zyrtec to relieve sx but not completely without resolute prompting her to \par - she states she lost 10 lbs during infection but is stable now \par - she notes she takes CPAP but stopped using it during her COVID infection as she did not want to be a superspreader in her house and felt like she was drowning\par - she notes she gets 7 hours of sleep  \par - she states she gets tired easily\par - states she has Post nasal drip \par -she denies any diarrhea, constipation, dysphagia, dizziness, leg swelling, leg pain, itchy eyes, itchy ears,, myalgias or arthralgias.

## 2021-03-08 NOTE — ASSESSMENT
[FreeTextEntry1] : Ms. CASIANO is a 65 year old female with a history of      who comes into the office today for pulmonary evaluation. s/p COVID -19 pneuomonitis with residual SOB\par \par \par The patient's shortness of breath is multifactorial due to:\par -pulmonary disease \par      - Asthmatic symptoms \par - s/p COVID -19 pneumonitis \par -poor breathing mechanics \par -overweight/out of shape\par -?cardiac disease (doubt) \par \par \par Problem 1: s/p covid -19 pneumonitis \par - recommend NAC 900mg BID\par - Obtain f/u CT scan in 6/2021 \par - full PFTs in 6 weeks and no vaccine for at least 3 months due to her s/p MAB \par \par Problem 2: \par - Add Generic 113 2 Puffs BID \par - Add Prednisone 20mg x 7 days and 10mg x 7 days \par \par Information sheet given to the patient to be reviewed, this medication is never to be used without consulting the prescribing physician. Proper dietary restraint is necessary specifically salt containing foods, if any reaction may occur should be reported. \par \par \par Problem 3 : Non allergic/ gustatory rhinitis  & sinusitis \par -Add Atrovent (0.6) nasal spray 2 sniff BID \par - Add Qnasl 1 sniff each nostril BID \par \par Problem 4 :OSAS\par - Continue CPAP machine \par \par Sleep apnea is associated with adverse clinical consequences which an affect most organ systems. Cardiovascular disease risk includes arrhythmias, atrial fibrillation, hypertension, coronary artery disease, and stroke. Metabolic disorders include diabetes type 2, non-alcoholic fatty liver disease. Mood disorder especially depression; and cognitive decline especially in the elderly. Associations with chronic reflux/Weston’s esophagus some but not all inclusive. \par -Reasons include arousal consistent with hypopnea; respiratory events most prominent in REM sleep or supine position; therefore sleep staging and body position are important for accurate diagnosis and estimation of AHI. \par \par According to the National Heart, Lung and Blood [place], CPAP or continuous positive airway pressure is a treatment that uses mild air pressure to keep breathing airways open. A CPAP machine includes a mask or other device that fits over the nose or nose and mouth. The mask is connected to a machine via the tube through which humidified air is blown. In the cases of obstructive sleep apnea, CPAP can reverse the complete blockages or narrowing of upper airways. Following diagnosis, CPAP machine pressures can be determined by a CPAP titration. Individuals who require CPAP can choose among masks and equipment that meet prescription and maximize comfort. Many become accustomed right away while others could require more time. Problems include uncomfortable masks or air leakage which can be adjusted to optimize compliance. \par \par \par  problem 5: GERD\par -Continue Pepcid 40 mg QHS \par -Rule of 2s: avoid eating too much, eating too fast, eating too late, eating too spicy, eating too lousy, eating two hours before bed.\par -Things to avoid including overeating, spicy foods, tight clothing, eating within three hours of bed, this list is not all inclusive. \par -For treatment of reflux, possible options discussed including diet control, H2 blockers, PPIs, as well as coating motility agents discussed as treatment options. Timing of meals and proximity of last meal to sleep were discussed. If symptoms persist, a formal gastrointestinal evaluation is needed.\par \par \par Problem : Poor Mechanics of Breathing\par - Proper breathing techniques were reviewed with an emphasis of exhalation. Patient instructed to breath in for 1 second and out for four seconds. Patient was encouraged to not talk while walking. \par \par Problem : Overweight\par -Weight loss, exercise, and diet control were discussed and are highly encouraged. Treatment options were given such as, aqua therapy, and contacting a nutritionist. Recommended to use the elliptical, stationary bike, less use of treadmill. Mindful eating was explained to the patient Obesity is associated with worsening asthma, shortness of breath, and potential for cardiac disease, diabetes, and other underlying medical conditions. \par \par Problem : Cardiac\par -recommended to follow up with a cardiologist\par \par Problem : health maintenance\par -s/p influenza vaccine\par -recommended strep pneumonia vaccines after age 65: Prevnar-13 vaccine, followed by Pneumo vaccine 23 one year following\par -recommended early intervention for URIs\par -recommended regular osteoporosis evaluations\par -recommended early dermatological evaluations\par -recommended after the age of 50 to the age of 70, colonoscopy every 5 years \par \par  Follow up in 6-8 weeks\par -he  is recommended to call with any changes, questions, or concerns.

## 2021-03-08 NOTE — ASSESSMENT
[FreeTextEntry1] : Ms. CASIANO is a 65 year old female with a history of      who comes into the office today for pulmonary evaluation. s/p COVID -19 pneuomonitis with residual SOB\par \par \par The patient's shortness of breath is multifactorial due to:\par -pulmonary disease \par      - Asthmatic symptoms \par - s/p COVID -19 pneumonitis \par -poor breathing mechanics \par -overweight/out of shape\par -?cardiac disease (doubt) \par \par \par Problem 1: s/p covid -19 pneumonitis \par - recommend NAC 900mg BID\par - Obtain f/u CT scan in 6/2021 \par - full PFTs in 6 weeks and no vaccine for at least 3 months due to her s/p MAB \par \par Problem 2: \par - Add Generic 113 2 Puffs BID \par - Add Prednisone 20mg x 7 days and 10mg x 7 days \par \par Information sheet given to the patient to be reviewed, this medication is never to be used without consulting the prescribing physician. Proper dietary restraint is necessary specifically salt containing foods, if any reaction may occur should be reported. \par \par \par Problem 3 : Non allergic/ gustatory rhinitis  & sinusitis \par -Add Atrovent (0.6) nasal spray 2 sniff BID \par - Add Qnasl 1 sniff each nostril BID \par \par Problem 4 :OSAS\par - Continue CPAP machine \par \par Sleep apnea is associated with adverse clinical consequences which an affect most organ systems. Cardiovascular disease risk includes arrhythmias, atrial fibrillation, hypertension, coronary artery disease, and stroke. Metabolic disorders include diabetes type 2, non-alcoholic fatty liver disease. Mood disorder especially depression; and cognitive decline especially in the elderly. Associations with chronic reflux/Weston’s esophagus some but not all inclusive. \par -Reasons include arousal consistent with hypopnea; respiratory events most prominent in REM sleep or supine position; therefore sleep staging and body position are important for accurate diagnosis and estimation of AHI. \par \par According to the National Heart, Lung and Blood [place], CPAP or continuous positive airway pressure is a treatment that uses mild air pressure to keep breathing airways open. A CPAP machine includes a mask or other device that fits over the nose or nose and mouth. The mask is connected to a machine via the tube through which humidified air is blown. In the cases of obstructive sleep apnea, CPAP can reverse the complete blockages or narrowing of upper airways. Following diagnosis, CPAP machine pressures can be determined by a CPAP titration. Individuals who require CPAP can choose among masks and equipment that meet prescription and maximize comfort. Many become accustomed right away while others could require more time. Problems include uncomfortable masks or air leakage which can be adjusted to optimize compliance. \par \par \par  problem 5: GERD\par -Continue Pepcid 40 mg QHS \par -Rule of 2s: avoid eating too much, eating too fast, eating too late, eating too spicy, eating too lousy, eating two hours before bed.\par -Things to avoid including overeating, spicy foods, tight clothing, eating within three hours of bed, this list is not all inclusive. \par -For treatment of reflux, possible options discussed including diet control, H2 blockers, PPIs, as well as coating motility agents discussed as treatment options. Timing of meals and proximity of last meal to sleep were discussed. If symptoms persist, a formal gastrointestinal evaluation is needed.\par \par \par Problem : Poor Mechanics of Breathing\par - Proper breathing techniques were reviewed with an emphasis of exhalation. Patient instructed to breath in for 1 second and out for four seconds. Patient was encouraged to not talk while walking. \par \par Problem : Overweight\par -Weight loss, exercise, and diet control were discussed and are highly encouraged. Treatment options were given such as, aqua therapy, and contacting a nutritionist. Recommended to use the elliptical, stationary bike, less use of treadmill. Mindful eating was explained to the patient Obesity is associated with worsening asthma, shortness of breath, and potential for cardiac disease, diabetes, and other underlying medical conditions. \par \par Problem : Cardiac\par -recommended to follow up with a cardiologist\par \par Problem : health maintenance\par -s/p influenza vaccine\par -recommended strep pneumonia vaccines after age 65: Prevnar-13 vaccine, followed by Pneumo vaccine 23 one year following\par -recommended early intervention for URIs\par -recommended regular osteoporosis evaluations\par -recommended early dermatological evaluations\par -recommended after the age of 50 to the age of 70, colonoscopy every 5 years \par \par  Follow up in 6-8 weeks\par -he  is recommended to call with any changes, questions, or concerns.

## 2021-03-08 NOTE — ADDENDUM
[FreeTextEntry1] : Documented by [place] acting as a scribe for Dr. Balta Verma on 03/08/2021.\par \par All medical record entries made by the Scribe were at my, Dr. Balta Verma's, direction and personally dictated by me on 03/08/2021. I have reviewed the chart and agree that the record accurately reflects my personal performance of the history, physical exam, assessment and plan. I have also personally directed, reviewed, and agree with the discharge instructions.

## 2021-03-08 NOTE — REVIEW OF SYSTEMS
[Recent Wt Loss (___ Lbs)] : ~T recent [unfilled] lb weight loss [Postnasal Drip] : postnasal drip [SOB on Exertion] : sob on exertion [Chest Discomfort] : chest discomfort [Nocturia] : nocturia [Negative] : Endocrine [Cough] : no cough [Edema] : no edema [Palpitations] : no palpitations

## 2021-03-08 NOTE — PROCEDURE
[FreeTextEntry1] : CXR reveals a normal sized heart; no evidence of infiltrate or effusion--a normal appearing chest radiograph \par \par \par CT (2.25.2021): remnants of Pneumonia

## 2021-03-08 NOTE — HISTORY OF PRESENT ILLNESS
[TextBox_4] : Ms. CASIANO is a 65 year old female presenting to the office today for initial pulmonary evaluation. Her chief complaint is s/p COVID PNA with PND. \par \par - she states she is an  working from home\par - she states in jan 20th 2021 she tested positive for COVID and got sx on 1/24/21, received MAB on feb 3rd 2021. \par - she did not have fever but received an artovent inhaler to ease flu- like symptoms by Dr Manning\par - she states Dr. aMnning changed her inhaler to \par - she endorses a dry cough and PND with clear discharge \par - she states she took mucinex and zyrtec to relieve sx but not completely without resolute prompting her to \par - she states she lost 10 lbs during infection but is stable now \par - she notes she takes CPAP but stopped using it during her COVID infection as she did not want to be a superspreader in her house and felt like she was drowning\par - she notes she gets 7 hours of sleep  \par - she states she gets tired easily\par - states she has Post nasal drip \par -she denies any diarrhea, constipation, dysphagia, dizziness, leg swelling, leg pain, itchy eyes, itchy ears,, myalgias or arthralgias.

## 2021-03-08 NOTE — ASSESSMENT
[FreeTextEntry1] : Ms. CASIANO is a 65 year old female with a history of      who comes into the office today for pulmonary evaluation. s/p COVID -19 pneuomonitis with residual SOB\par \par \par The patient's shortness of breath is multifactorial due to:\par -pulmonary disease \par      - Asthmatic symptoms \par - s/p COVID -19 pneumonitis \par -poor breathing mechanics \par -overweight/out of shape\par -?cardiac disease (doubt) \par \par \par Problem 1: s/p covid -19 pneumonitis \par - recommend NAC 900mg BID\par - Obtain f/u CT scan in 6/2021 \par - full PFTs in 6 weeks and no vaccine for at least 3 months due to her s/p MAB \par \par Problem 2: \par - Add Generic 113 2 Puffs BID \par - Add Prednisone 20mg x 7 days and 10mg x 7 days \par \par Information sheet given to the patient to be reviewed, this medication is never to be used without consulting the prescribing physician. Proper dietary restraint is necessary specifically salt containing foods, if any reaction may occur should be reported. \par \par \par Problem 3 : Non allergic/ gustatory rhinitis  & sinusitis \par -Add Atrovent (0.6) nasal spray 2 sniff BID \par - Add Qnasl 1 sniff each nostril BID \par \par Problem 4 :OSAS\par - Continue CPAP machine \par \par Sleep apnea is associated with adverse clinical consequences which an affect most organ systems. Cardiovascular disease risk includes arrhythmias, atrial fibrillation, hypertension, coronary artery disease, and stroke. Metabolic disorders include diabetes type 2, non-alcoholic fatty liver disease. Mood disorder especially depression; and cognitive decline especially in the elderly. Associations with chronic reflux/Weston’s esophagus some but not all inclusive. \par -Reasons include arousal consistent with hypopnea; respiratory events most prominent in REM sleep or supine position; therefore sleep staging and body position are important for accurate diagnosis and estimation of AHI. \par \par According to the National Heart, Lung and Blood [place], CPAP or continuous positive airway pressure is a treatment that uses mild air pressure to keep breathing airways open. A CPAP machine includes a mask or other device that fits over the nose or nose and mouth. The mask is connected to a machine via the tube through which humidified air is blown. In the cases of obstructive sleep apnea, CPAP can reverse the complete blockages or narrowing of upper airways. Following diagnosis, CPAP machine pressures can be determined by a CPAP titration. Individuals who require CPAP can choose among masks and equipment that meet prescription and maximize comfort. Many become accustomed right away while others could require more time. Problems include uncomfortable masks or air leakage which can be adjusted to optimize compliance. \par \par \par  problem 5: GERD\par -Continue Pepcid 40 mg QHS \par -Rule of 2s: avoid eating too much, eating too fast, eating too late, eating too spicy, eating too lousy, eating two hours before bed.\par -Things to avoid including overeating, spicy foods, tight clothing, eating within three hours of bed, this list is not all inclusive. \par -For treatment of reflux, possible options discussed including diet control, H2 blockers, PPIs, as well as coating motility agents discussed as treatment options. Timing of meals and proximity of last meal to sleep were discussed. If symptoms persist, a formal gastrointestinal evaluation is needed.\par \par \par Problem : Poor Mechanics of Breathing\par - Proper breathing techniques were reviewed with an emphasis of exhalation. Patient instructed to breath in for 1 second and out for four seconds. Patient was encouraged to not talk while walking. \par \par Problem : Overweight\par -Weight loss, exercise, and diet control were discussed and are highly encouraged. Treatment options were given such as, aqua therapy, and contacting a nutritionist. Recommended to use the elliptical, stationary bike, less use of treadmill. Mindful eating was explained to the patient Obesity is associated with worsening asthma, shortness of breath, and potential for cardiac disease, diabetes, and other underlying medical conditions. \par \par Problem : Cardiac\par -recommended to follow up with a cardiologist\par \par Problem : health maintenance\par -s/p influenza vaccine\par -recommended strep pneumonia vaccines after age 65: Prevnar-13 vaccine, followed by Pneumo vaccine 23 one year following\par -recommended early intervention for URIs\par -recommended regular osteoporosis evaluations\par -recommended early dermatological evaluations\par -recommended after the age of 50 to the age of 70, colonoscopy every 5 years \par \par  Follow up in 6-8 weeks\par -he  is recommended to call with any changes, questions, or concerns.

## 2021-03-09 ENCOUNTER — NON-APPOINTMENT (OUTPATIENT)
Age: 66
End: 2021-03-09

## 2021-03-11 ENCOUNTER — APPOINTMENT (OUTPATIENT)
Dept: INTERNAL MEDICINE | Facility: CLINIC | Age: 66
End: 2021-03-11
Payer: COMMERCIAL

## 2021-03-11 DIAGNOSIS — M25.50 PAIN IN UNSPECIFIED JOINT: ICD-10-CM

## 2021-03-11 DIAGNOSIS — B07.8 OTHER VIRAL WARTS: ICD-10-CM

## 2021-03-11 PROCEDURE — 99214 OFFICE O/P EST MOD 30 MIN: CPT

## 2021-03-11 PROCEDURE — 99072 ADDL SUPL MATRL&STAF TM PHE: CPT

## 2021-03-12 ENCOUNTER — RX RENEWAL (OUTPATIENT)
Age: 66
End: 2021-03-12

## 2021-03-12 VITALS — HEART RATE: 88 BPM | SYSTOLIC BLOOD PRESSURE: 128 MMHG | RESPIRATION RATE: 16 BRPM | DIASTOLIC BLOOD PRESSURE: 74 MMHG

## 2021-03-12 PROBLEM — B07.8 VERRUCA VULGARIS: Status: ACTIVE | Noted: 2021-03-11

## 2021-03-12 PROBLEM — M25.50 ARTHRALGIA: Noted: 2020-12-04

## 2021-03-12 NOTE — ASSESSMENT
[FreeTextEntry1] : Recovering from COVID pneumonia but with modestly disabling shortness of breath and lack of stamina.  I have advised her to refrain from work and get plenty of rest.  Would advocate for a temporary disability 3/1-4/12/2021\par \par Should follow up with pulmonary. \par \par 34 minutes spent in preparation of this visit, including review of previous notes and test results, interview and examination of patient, discussion of plan, arranging for appropriate testing and treatment, and documentation.

## 2021-03-12 NOTE — PHYSICAL EXAM
[Normal Rate] : the respiratory rate was normal [Distress] : respiratory distress was noted [Mild] : mild [Dry Cough] : a dry cough was heard [Clear Bilaterally] : the lungs were clear to auscultation bilaterally [Normal] : soft, non-tender, non-distended, no masses palpated, no HSM and normal bowel sounds

## 2021-03-12 NOTE — HISTORY OF PRESENT ILLNESS
[de-identified] : RTC for follow up of COVID. \par Diagnosed 1/20/21 with fever and cough.\par Recovered from acute infection, but has severe residual symptoms.  Has constant cough, malaise and lack of stamina.  Modestly short of breath with minimal exertion.  Is seeing a pulmonologist as well, Dr. Verma, [phone], who is helping manage  pulmonary symptoms, and is using steroid medications.   \par \par No chest pain, palpitations, orthopnea, PND, lightheadedness or edema.

## 2021-03-18 ENCOUNTER — APPOINTMENT (OUTPATIENT)
Dept: UROLOGY | Facility: CLINIC | Age: 66
End: 2021-03-18
Payer: COMMERCIAL

## 2021-03-18 ENCOUNTER — OUTPATIENT (OUTPATIENT)
Dept: OUTPATIENT SERVICES | Facility: HOSPITAL | Age: 66
LOS: 1 days | End: 2021-03-18
Payer: COMMERCIAL

## 2021-03-18 DIAGNOSIS — Z98.891 HISTORY OF UTERINE SCAR FROM PREVIOUS SURGERY: Chronic | ICD-10-CM

## 2021-03-18 DIAGNOSIS — R94.39 ABNORMAL RESULT OF OTHER CARDIOVASCULAR FUNCTION STUDY: ICD-10-CM

## 2021-03-18 DIAGNOSIS — R35.0 FREQUENCY OF MICTURITION: ICD-10-CM

## 2021-03-18 PROCEDURE — 99072 ADDL SUPL MATRL&STAF TM PHE: CPT

## 2021-03-18 PROCEDURE — 76775 US EXAM ABDO BACK WALL LIM: CPT | Mod: 26

## 2021-03-18 PROCEDURE — 99213 OFFICE O/P EST LOW 20 MIN: CPT | Mod: 25

## 2021-03-18 PROCEDURE — 76775 US EXAM ABDO BACK WALL LIM: CPT

## 2021-03-19 ENCOUNTER — NON-APPOINTMENT (OUTPATIENT)
Age: 66
End: 2021-03-19

## 2021-03-19 LAB
APPEARANCE: ABNORMAL
BACTERIA UR CULT: NORMAL
BACTERIA: NEGATIVE
BILIRUBIN URINE: NEGATIVE
BLOOD URINE: NEGATIVE
COLOR: NORMAL
GLUCOSE QUALITATIVE U: NEGATIVE
HYALINE CASTS: 1 /LPF
KETONES URINE: NEGATIVE
LEUKOCYTE ESTERASE URINE: NEGATIVE
MICROSCOPIC-UA: NORMAL
NITRITE URINE: NEGATIVE
PH URINE: 7
PROTEIN URINE: NEGATIVE
RED BLOOD CELLS URINE: 2 /HPF
SPECIFIC GRAVITY URINE: 1.01
SQUAMOUS EPITHELIAL CELLS: 1 /HPF
UROBILINOGEN URINE: NORMAL
WHITE BLOOD CELLS URINE: 4 /HPF

## 2021-03-20 NOTE — LETTER BODY
[FreeTextEntry1] : Curtis Manning MD\par [address]\[place] [address]\par [phone]\par \par Dear Dr. Manning,\par \par Reason for visit: Previous right ureteral stone. Left renal stone.\par \par This is a 65 year-old woman with previous gross hematuria, a previous right ureteral stone, and a left renal stone. Her CT scan in Sept 2020 demonstrated a 3 mm nonobstructing left renal stone without evidence of a right ureteral stone or hydronephrosis. The patient returns today for follow-up and renal ultrasound. Since he was last seen, the patient denies any interval complaints or difficulties. Patient denies flank pain or other urinary concerns. She is entirely asymptomatic. Patient denies any changes in health. All other review of systems are negative. Past medical history, family history and social history were unchanged. Medications and allergies were reviewed. She has no known allergies to medication. \par \par On examination, the patient is a healthy-appearing woman in no acute distress. She is alert and oriented follows commands. She has normal mood and affect. She is normocephalic. Neck is supple. Oral no thrush. Respirations are unlabored. Abdomen is soft and nontender. Bladder is nonpalpable. No CVA tenderness. Neurologically she is grossly intact. No peripheral edema. Skin without gross abnormality.\par \par I personally reviewed ultrasound images with the patient today and images demonstrated a 3 mm nonobstructing stone in the lower pole of the left kidney. Both kidneys are normal in size and echogenicity without hydronephrosis or solid masses visualized\par \par Assessment: Previous gross hematuria. Right ureteral stone, resolved. Left renal stone, stable.\par \par I counseled the patient. Her renal ultrasound today demonstrated a stable small left renal stone. I reassured the patient. I encouraged the patient to maintain a low-protein low-sodium diet. I also encouraged hydration to prevent stone formation. She will obtain urinalysis and urine culture today to evaluate for infection. I encouraged the patient to follow-up in 1 year for repeat renal ultrasound to ensure stability. Risks and alternatives were discussed. I answered the patient questions. The patient will follow-up as directed and will contact me with any questions or concerns. Thank you for the opportunity to participate in the care of Ms. CASIANO. I will keep you updated on her progress.\par \par Plan: Urinalysis. Urine culture. Stone diet. Hydration. Follow-up in 1 year for renal ultrasound.\par \par

## 2021-03-20 NOTE — ADDENDUM
[FreeTextEntry1] : Entered by Ward Jorge, acting as scribe for Dr. Manuel Oleary.\par \par The documentation recorded by the scribe accurately reflects the service I personally performed and the decisions made by me.\par

## 2021-03-21 LAB
ALBUMIN SERPL ELPH-MCNC: 4.1 G/DL
ALP BLD-CCNC: 103 U/L
ALT SERPL-CCNC: 15 U/L
ANION GAP SERPL CALC-SCNC: 15 MMOL/L
APPEARANCE: ABNORMAL
AST SERPL-CCNC: 17 U/L
BACTERIA: NEGATIVE
BASOPHILS # BLD AUTO: 0.04 K/UL
BASOPHILS NFR BLD AUTO: 0.7 %
BILIRUB SERPL-MCNC: 0.4 MG/DL
BILIRUBIN URINE: NEGATIVE
BLOOD URINE: NORMAL
BUN SERPL-MCNC: 13 MG/DL
CALCIUM SERPL-MCNC: 10 MG/DL
CHLORIDE SERPL-SCNC: 104 MMOL/L
CO2 SERPL-SCNC: 23 MMOL/L
COLOR: YELLOW
CREAT SERPL-MCNC: 1.08 MG/DL
DEPRECATED D DIMER PPP IA-ACNC: <150 NG/ML DDU
EOSINOPHIL # BLD AUTO: 0.06 K/UL
EOSINOPHIL NFR BLD AUTO: 1.1 %
ESTIMATED AVERAGE GLUCOSE: 126 MG/DL
GLUCOSE QUALITATIVE U: NEGATIVE
GLUCOSE SERPL-MCNC: 106 MG/DL
HBA1C MFR BLD HPLC: 6 %
HCT VFR BLD CALC: 42.8 %
HGB BLD-MCNC: 13.4 G/DL
HYALINE CASTS: 3 /LPF
IMM GRANULOCYTES NFR BLD AUTO: 0.2 %
KETONES URINE: NEGATIVE
LEUKOCYTE ESTERASE URINE: ABNORMAL
LYMPHOCYTES # BLD AUTO: 1.32 K/UL
LYMPHOCYTES NFR BLD AUTO: 23.1 %
MAN DIFF?: NORMAL
MCHC RBC-ENTMCNC: 29.4 PG
MCHC RBC-ENTMCNC: 31.3 GM/DL
MCV RBC AUTO: 93.9 FL
MICROSCOPIC-UA: NORMAL
MONOCYTES # BLD AUTO: 0.69 K/UL
MONOCYTES NFR BLD AUTO: 12.1 %
NEUTROPHILS # BLD AUTO: 3.59 K/UL
NEUTROPHILS NFR BLD AUTO: 62.8 %
NITRITE URINE: NEGATIVE
PH URINE: 7
PLATELET # BLD AUTO: 259 K/UL
POTASSIUM SERPL-SCNC: 5 MMOL/L
PROT SERPL-MCNC: 7.8 G/DL
PROTEIN URINE: NORMAL
RBC # BLD: 4.56 M/UL
RBC # FLD: 12.9 %
RED BLOOD CELLS URINE: 5 /HPF
SODIUM SERPL-SCNC: 142 MMOL/L
SPECIFIC GRAVITY URINE: 1.02
SQUAMOUS EPITHELIAL CELLS: 2 /HPF
TSH SERPL-ACNC: 0.76 UIU/ML
UROBILINOGEN URINE: NORMAL
WBC # FLD AUTO: 5.71 K/UL
WHITE BLOOD CELLS URINE: 5 /HPF

## 2021-03-25 DIAGNOSIS — L30.9 DERMATITIS, UNSPECIFIED: ICD-10-CM

## 2021-03-25 DIAGNOSIS — L50.0 ALLERGIC URTICARIA: ICD-10-CM

## 2021-03-25 DIAGNOSIS — L73.9 FOLLICULAR DISORDER, UNSPECIFIED: ICD-10-CM

## 2021-03-25 DIAGNOSIS — Z00.00 ENCOUNTER FOR GENERAL ADULT MEDICAL EXAMINATION WITHOUT ABNORMAL FINDINGS: ICD-10-CM

## 2021-03-25 DIAGNOSIS — R31.9 HEMATURIA, UNSPECIFIED: ICD-10-CM

## 2021-03-29 ENCOUNTER — NON-APPOINTMENT (OUTPATIENT)
Age: 66
End: 2021-03-29

## 2021-03-31 ENCOUNTER — APPOINTMENT (OUTPATIENT)
Dept: INTERNAL MEDICINE | Facility: CLINIC | Age: 66
End: 2021-03-31
Payer: COMMERCIAL

## 2021-03-31 VITALS — SYSTOLIC BLOOD PRESSURE: 112 MMHG | HEART RATE: 78 BPM | RESPIRATION RATE: 14 BRPM | DIASTOLIC BLOOD PRESSURE: 68 MMHG

## 2021-03-31 DIAGNOSIS — B37.0 CANDIDAL STOMATITIS: ICD-10-CM

## 2021-03-31 PROCEDURE — 99072 ADDL SUPL MATRL&STAF TM PHE: CPT

## 2021-03-31 PROCEDURE — 99213 OFFICE O/P EST LOW 20 MIN: CPT

## 2021-03-31 NOTE — PHYSICAL EXAM
[Scattered Wheezes] : scattered wheezing was heard [Normal] : normal rate, regular rhythm, normal S1 and S2 and no murmur heard [de-identified] : patchy erythematous regions

## 2021-03-31 NOTE — HISTORY OF PRESENT ILLNESS
[de-identified] : RTC for follow up of COVID\par Has been following with pulmonary\par Still with exertional shortness of breath, though overall better. \par No cough, wheezing, hemoptysis, pleuritic chest pain.\par No fever, chills, night sweats.  weight loss, fatigue.\par Does remark about some mouth burning\par

## 2021-04-01 LAB
COVID-19 NUCLEOCAPSID  GAM ANTIBODY INTERPRETATION: POSITIVE
COVID-19 SPIKE DOMAIN ANTIBODY INTERPRETATION: POSITIVE
SARS-COV-2 AB SERPL IA-ACNC: >250 U/ML
SARS-COV-2 AB SERPL QL IA: 82.6 INDEX

## 2021-04-08 ENCOUNTER — APPOINTMENT (OUTPATIENT)
Dept: DERMATOLOGY | Facility: CLINIC | Age: 66
End: 2021-04-08

## 2021-04-20 ENCOUNTER — LABORATORY RESULT (OUTPATIENT)
Age: 66
End: 2021-04-20

## 2021-04-20 ENCOUNTER — APPOINTMENT (OUTPATIENT)
Dept: DERMATOLOGY | Facility: CLINIC | Age: 66
End: 2021-04-20
Payer: COMMERCIAL

## 2021-04-20 VITALS — WEIGHT: 158 LBS | BODY MASS INDEX: 27.65 KG/M2 | HEIGHT: 63.5 IN

## 2021-04-20 PROCEDURE — 99072 ADDL SUPL MATRL&STAF TM PHE: CPT

## 2021-04-20 PROCEDURE — 99204 OFFICE O/P NEW MOD 45 MIN: CPT

## 2021-04-20 RX ORDER — KETOCONAZOLE 20.5 MG/ML
2 SHAMPOO, SUSPENSION TOPICAL
Qty: 1 | Refills: 2 | Status: ACTIVE | COMMUNITY
Start: 2021-04-20 | End: 1900-01-01

## 2021-04-21 ENCOUNTER — NON-APPOINTMENT (OUTPATIENT)
Age: 66
End: 2021-04-21

## 2021-04-28 ENCOUNTER — NON-APPOINTMENT (OUTPATIENT)
Age: 66
End: 2021-04-28

## 2021-05-15 LAB — SARS-COV-2 N GENE NPH QL NAA+PROBE: NOT DETECTED

## 2021-05-19 ENCOUNTER — APPOINTMENT (OUTPATIENT)
Dept: PULMONOLOGY | Facility: CLINIC | Age: 66
End: 2021-05-19
Payer: COMMERCIAL

## 2021-05-19 VITALS
HEIGHT: 62 IN | WEIGHT: 163 LBS | BODY MASS INDEX: 30 KG/M2 | DIASTOLIC BLOOD PRESSURE: 76 MMHG | TEMPERATURE: 97.3 F | RESPIRATION RATE: 16 BRPM | SYSTOLIC BLOOD PRESSURE: 124 MMHG | HEART RATE: 80 BPM | OXYGEN SATURATION: 97 %

## 2021-05-19 PROCEDURE — 94729 DIFFUSING CAPACITY: CPT

## 2021-05-19 PROCEDURE — 99214 OFFICE O/P EST MOD 30 MIN: CPT | Mod: 25

## 2021-05-19 PROCEDURE — 99072 ADDL SUPL MATRL&STAF TM PHE: CPT

## 2021-05-19 PROCEDURE — 94727 GAS DIL/WSHOT DETER LNG VOL: CPT

## 2021-05-19 PROCEDURE — 94618 PULMONARY STRESS TESTING: CPT

## 2021-05-19 PROCEDURE — ZZZZZ: CPT

## 2021-05-19 PROCEDURE — 94010 BREATHING CAPACITY TEST: CPT

## 2021-05-19 PROCEDURE — 95012 NITRIC OXIDE EXP GAS DETER: CPT

## 2021-05-19 NOTE — PROCEDURE
[FreeTextEntry1] : Full PFT revealed mild obstructive dysfunction, with a FEV1 of 1.59L, which is 70% of predicted, normal lung volumes, and a low-normal diffusion of 15.0, which is 74% of predicted, with a normal flow volume loop \par \par FENO was 25; a normal value being less than 25\par Fractional exhaled nitric oxide (FENO) is regarded as a simple, noninvasive method for assessing eosinophilic airway inflammation. Produced by a variety of cells within the lung, nitric oxide (NO) concentrations are generally low in healthy individuals. However, high concentrations of NO appear to be involved in nonspecific host defense mechanisms and chronic inflammatory diseases such as asthma. The American Thoracic Society (ATS) therefore has recommended using FENO to aid in the diagnosis and monitoring of eosinophilic airway inflammation and asthma, and for identifying steroid responsive individuals whose chronic respiratory symptoms may be caused by airway inflammation. \par \par 6 minute walk test reveals a low saturation of 96% with very slight dyspnea; walked 456.4 meters

## 2021-05-19 NOTE — ADDENDUM
[FreeTextEntry1] : Documented by [place] acting as a scribe for Dr. Balta Verma on 05/19/2021.\par \par All medical record entries made by the Scribe were at my, Dr. Balta Verma's, direction and personally dictated by me on 05/19/2021. I have reviewed the chart and agree that the record accurately reflects my personal performance of the history, physical exam, assessment and plan. I have also personally directed, reviewed, and agree with the discharge instructions.

## 2021-05-19 NOTE — PHYSICAL EXAM
[No Acute Distress] : no acute distress [Normal Oropharynx] : normal oropharynx [III] : Mallampati Class: III [Normal Appearance] : normal appearance [No Neck Mass] : no neck mass [Normal Rate/Rhythm] : normal rate/rhythm [Normal S1, S2] : normal s1, s2 [No Murmurs] : no murmurs [No Resp Distress] : no resp distress [Clear to Auscultation Bilaterally] : clear to auscultation bilaterally [No Abnormalities] : no abnormalities [Benign] : benign [Normal Gait] : normal gait [No Clubbing] : no clubbing [No Cyanosis] : no cyanosis [No Edema] : no edema [FROM] : FROM [Normal Color/ Pigmentation] : normal color/ pigmentation [No Focal Deficits] : no focal deficits [Oriented x3] : oriented x3 [Normal Affect] : normal affect [TextBox_2] : overweight [TextBox_68] : I:E ratio 1:3; clear

## 2021-05-19 NOTE — HISTORY OF PRESENT ILLNESS
[TextBox_4] : Ms. CASIANO is a 65 year old female with a history of allergies, GERD, overweight, COLLINS, RADS, Covid-19 pneumonitis, post nasal drip presenting to the office today for a follow up pulmonary evaluation. Her chief complaint is \par -she notes her post nasal drip has improved, and is no longer coughing\par -she note sheh has heavy breathing near the end of the day, after working at home for several hours. She feels better after she relaxes and watches TV. She notes she has occasional muscle pressure in her chest from sleeping in an odd position\par -she notes her reflux is controlled with famotidine\par -she reports her energy level is good. She walks, but doesn’t do any other formal exercise\par -she notes she has gained a few pounds since her last visit, and is eating well\par -she denies taking any new medications, vitamins, or supplements, except NAC\par -she went to her dermatologist for her eczema, and was given solutions, which helped\par -she is awaiting her 2nd Pfizer vaccine\par -she denies any hoarseness, chest pain, chest pressure, diarrhea, constipation, dysphagia, dizziness, sour taste in the mouth, leg swelling, leg pain, itchy eyes, itchy ears, heartburn, reflux, myalgias or arthralgias.

## 2021-05-19 NOTE — ASSESSMENT
[FreeTextEntry1] : Ms. CASIANO is a 65 year old female with a history of   who comes into the office today for pulmonary evaluation s/p COVID -19 pneumonitis with residual SOB (NC with meds)\par \par \par The patient's shortness of breath is multifactorial due to:\par -pulmonary disease \par      - Asthmatic symptoms \par - s/p COVID -19 pneumonitis \par -poor breathing mechanics \par -overweight/out of shape\par -?cardiac disease (doubt) \par \par \par Problem 1: s/p covid -19 pneumonitis \par -2nd vaccination pending (Pfizer). \par - recommend NAC 900mg BID\par - Obtain f/u CT scan in 6/2021 \par - full PFTs in 6 weeks and no vaccine for at least 3 months due to her s/p MAB \par \par Problem 2: RAD \par - Add Generic Advair 113 2 Puffs BID - can try Trelegy 100 1 inhalation QD \par - s/p Prednisone 20mg x 7 days and 10mg x 7 days \par -Asthma is believed to be caused by inherited (genetic) and environmental factor, but its exact cause is unknown. Asthma may be triggered by allergens, lung infections, or irritants in the air. Asthma triggers are different for each person\par \par Information sheet given to the patient to be reviewed, this medication is never to be used without consulting the prescribing physician. Proper dietary restraint is necessary specifically salt containing foods, if any reaction may occur should be reported. \par \par \par Problem 3 : Non allergic/ gustatory rhinitis  & sinusitis \par -Add Atrovent (0.6) nasal spray 2 sniff BID \par - Add Qnasl 1 sniff each nostril BID \par \par Problem 4 :OSAS\par - Continue CPAP machine \par \par Sleep apnea is associated with adverse clinical consequences which an affect most organ systems. Cardiovascular disease risk includes arrhythmias, atrial fibrillation, hypertension, coronary artery disease, and stroke. Metabolic disorders include diabetes type 2, non-alcoholic fatty liver disease. Mood disorder especially depression; and cognitive decline especially in the elderly. Associations with chronic reflux/Weston’s esophagus some but not all inclusive. \par -Reasons include arousal consistent with hypopnea; respiratory events most prominent in REM sleep or supine position; therefore sleep staging and body position are important for accurate diagnosis and estimation of AHI. \par \par According to the National Heart, Lung and Blood [place], CPAP or continuous positive airway pressure is a treatment that uses mild air pressure to keep breathing airways open. A CPAP machine includes a mask or other device that fits over the nose or nose and mouth. The mask is connected to a machine via the tube through which humidified air is blown. In the cases of obstructive sleep apnea, CPAP can reverse the complete blockages or narrowing of upper airways. Following diagnosis, CPAP machine pressures can be determined by a CPAP titration. Individuals who require CPAP can choose among masks and equipment that meet prescription and maximize comfort. Many become accustomed right away while others could require more time. Problems include uncomfortable masks or air leakage which can be adjusted to optimize compliance. \par \par \par  problem 5: GERD\par -Continue Pepcid 40 mg QHS \par -Rule of 2s: avoid eating too much, eating too fast, eating too late, eating too spicy, eating too lousy, eating two hours before bed.\par -Things to avoid including overeating, spicy foods, tight clothing, eating within three hours of bed, this list is not all inclusive. \par -For treatment of reflux, possible options discussed including diet control, H2 blockers, PPIs, as well as coating motility agents discussed as treatment options. Timing of meals and proximity of last meal to sleep were discussed. If symptoms persist, a formal gastrointestinal evaluation is needed.\par \par \par Problem : Poor Mechanics of Breathing\par - Proper breathing techniques were reviewed with an emphasis of exhalation. Patient instructed to breath in for 1 second and out for four seconds. Patient was encouraged to not talk while walking. \par \par Problem : Overweight\par -Weight loss, exercise, and diet control were discussed and are highly encouraged. Treatment options were given such as, aqua therapy, and contacting a nutritionist. Recommended to use the elliptical, stationary bike, less use of treadmill. Mindful eating was explained to the patient Obesity is associated with worsening asthma, shortness of breath, and potential for cardiac disease, diabetes, and other underlying medical conditions. \par \par Problem : Cardiac\par -recommended to follow up with a cardiologist\par \par Problem : health maintenance\par -s/p influenza vaccine\par -recommended strep pneumonia vaccines after age 65: Prevnar-13 vaccine, followed by Pneumo vaccine 23 one year following\par -recommended early intervention for URIs\par -recommended regular osteoporosis evaluations\par -recommended early dermatological evaluations\par -recommended after the age of 50 to the age of 70, colonoscopy every 5 years \par \par  Follow up in 6-8 weeks\par -he  is recommended to call with any changes, questions, or concerns.

## 2021-05-19 NOTE — REVIEW OF SYSTEMS
[Negative] : Endocrine [Recent Wt Gain (___ Lbs)] : ~T recent [unfilled] lb weight gain [Dyspnea] : dyspnea [SOB on Exertion] : sob on exertion [Postnasal Drip] : no postnasal drip [Cough] : no cough [Chest Discomfort] : no chest discomfort [GERD] : no gerd [Diarrhea] : no diarrhea [Constipation] : no constipation [Dysphagia] : no dysphagia

## 2021-05-28 ENCOUNTER — NON-APPOINTMENT (OUTPATIENT)
Age: 66
End: 2021-05-28

## 2021-05-28 ENCOUNTER — APPOINTMENT (OUTPATIENT)
Dept: CARDIOLOGY | Facility: CLINIC | Age: 66
End: 2021-05-28
Payer: COMMERCIAL

## 2021-05-28 VITALS
OXYGEN SATURATION: 99 % | SYSTOLIC BLOOD PRESSURE: 124 MMHG | DIASTOLIC BLOOD PRESSURE: 70 MMHG | HEIGHT: 62 IN | TEMPERATURE: 97.5 F | HEART RATE: 80 BPM | BODY MASS INDEX: 29.81 KG/M2 | WEIGHT: 162 LBS

## 2021-05-28 PROCEDURE — 93000 ELECTROCARDIOGRAM COMPLETE: CPT

## 2021-05-28 PROCEDURE — 99072 ADDL SUPL MATRL&STAF TM PHE: CPT

## 2021-05-28 PROCEDURE — 99204 OFFICE O/P NEW MOD 45 MIN: CPT

## 2021-06-01 ENCOUNTER — APPOINTMENT (OUTPATIENT)
Dept: CARDIOLOGY | Facility: CLINIC | Age: 66
End: 2021-06-01
Payer: COMMERCIAL

## 2021-06-01 PROCEDURE — 99072 ADDL SUPL MATRL&STAF TM PHE: CPT

## 2021-06-01 PROCEDURE — 93306 TTE W/DOPPLER COMPLETE: CPT

## 2021-06-02 ENCOUNTER — NON-APPOINTMENT (OUTPATIENT)
Age: 66
End: 2021-06-02

## 2021-06-02 ENCOUNTER — APPOINTMENT (OUTPATIENT)
Dept: ENDOCRINOLOGY | Facility: CLINIC | Age: 66
End: 2021-06-02
Payer: COMMERCIAL

## 2021-06-02 VITALS
WEIGHT: 162 LBS | OXYGEN SATURATION: 97 % | SYSTOLIC BLOOD PRESSURE: 124 MMHG | DIASTOLIC BLOOD PRESSURE: 78 MMHG | BODY MASS INDEX: 29.63 KG/M2 | HEART RATE: 76 BPM

## 2021-06-02 DIAGNOSIS — M81.0 AGE-RELATED OSTEOPOROSIS W/OUT CURRENT PATHOLOGICAL FRACTURE: ICD-10-CM

## 2021-06-02 PROCEDURE — 99205 OFFICE O/P NEW HI 60 MIN: CPT

## 2021-06-02 PROCEDURE — 99072 ADDL SUPL MATRL&STAF TM PHE: CPT

## 2021-06-02 RX ORDER — NYSTATIN 100000 [USP'U]/ML
100000 SUSPENSION ORAL
Qty: 1 | Refills: 2 | Status: DISCONTINUED | COMMUNITY
Start: 2021-03-31 | End: 2021-06-02

## 2021-06-02 RX ORDER — RISEDRONATE SODIUM 150 MG/1
150 TABLET, FILM COATED ORAL
Qty: 1 | Refills: 3 | Status: ACTIVE | COMMUNITY
Start: 2021-06-02 | End: 1900-01-01

## 2021-06-02 RX ORDER — IPRATROPIUM BROMIDE 21 UG/1
0.03 SPRAY NASAL TWICE DAILY
Qty: 1 | Refills: 0 | Status: DISCONTINUED | COMMUNITY
Start: 2018-02-02 | End: 2021-06-02

## 2021-06-02 RX ORDER — PREDNISONE 10 MG/1
10 TABLET ORAL
Qty: 50 | Refills: 0 | Status: DISCONTINUED | COMMUNITY
Start: 2021-03-08 | End: 2021-06-02

## 2021-06-02 NOTE — REASON FOR VISIT
[Consultation] : a consultation visit [Osteoporosis] : osteoporosis [FreeTextEntry2] : Curtis Manning MD

## 2021-06-02 NOTE — CONSULT LETTER
[Dear  ___] : Dear  [unfilled], [Consult Letter:] : I had the pleasure of evaluating your patient, [unfilled]. [Please see my note below.] : Please see my note below. [Consult Closing:] : Thank you very much for allowing me to participate in the care of this patient.  If you have any questions, please do not hesitate to contact me. [FreeTextEntry2] : Curtis Manning MD

## 2021-06-02 NOTE — ASSESSMENT
[Bisphosphonate Therapy] : Risks and benefits of bisphosphonate therapy were  discussed with the patient including gastroesophageal irritation, osteonecrosis of the jaw, and atypical femur fractures, and acute phase reaction [Bisphosphonates] : The patient was instructed to take bisphosphonates on an empty stomach with a full glass of water,and wait at least 30 minutes before eating or lying down [FreeTextEntry1] : Ms. CASIANO is a 65 year old female w/ osteoporosis\par \par Pt states she was told of progression to osteoporosis after recent BMD 10/2020. She has not started any osteoporosis rx, was recommended to start Prolia. 0 falls in the last 6 months. No fh/o osteoporosis. No h/o fx. H/o kidney stones, previously passed 1 stone, one persistent 3 mm stone in situ, managed by Dr. Manuel Oleary. Did not have a 24 hr urine Ca test. \par No unusual risks for osteoporosis. Outside BMD 10/2020 indicates incorrect analysis of spine, excluding L3-L4, spine c/w osteoporosis, normal stable osteopenia in total hip, and stable osteopenia in femoral neck.\par Patient is at moderately increased risk of fracture based on low spine density.\par \par I recommend the pt start osteoporosis rx due to increased risk for future fx. I discussed rx with bisphosphonate therapy, including Fosamax, Actonel, Boniva, and IV Reclast. Risks and benefits of bisphosphonate therapy were discussed with the patient including minor aches & pains, heartburn, gastroesophageal irritation (excluding IV Reclast), osteonecrosis of the jaw, and atypical femur fractures, and acute phase reaction (w/ IV Reclast only). Pt would benefit from bisphosphonate therapy with the expectation of a drug holiday within 5 years. All questions were answered. Rx information handout provided. Pt understands and elects to start Actonel. Medication instructions reviewed. Prescription sent out.\par Commend full evaluation given kidney stone history to further assess calcium balance.  This should include PTH and vitamin D levels as well as 24 urine kidney stone collection.\par I recommend pt take no more than 500 mg Ca in addition to dietary intake and Vitamin D 1000 IU daily.\par \par Thyroid nodule incidentally on PE. TUS 6/2021 indicates simple right nodule and left cyst. Observe.\par \par Labs 3/2021 reviewed: Ca 10.0, normal. 12/2020 Vitamin D 68, normal. TSH 0.76, normal. Creatinine 1.08, normal.\par \par Request labs sent out. Request 24 hour urine Ca test. Request PTH.\par \par F/u in 4 months\par \par Pharmacological Management of Osteoporosis in Postmenopausal Women: An Endocrine Society  Clinical Practice Guideline. Richard R, Isaias WILLS., Black DM, Lyly AM, Jevon MH, Kaylynn ESCAMILLA. JCEM. Published 2019 Mar 25. 2019. Released May 2019.

## 2021-06-02 NOTE — PHYSICAL EXAM
[Alert] : alert [Well Nourished] : well nourished [No Acute Distress] : no acute distress [Well Developed] : well developed [Normal Sclera/Conjunctiva] : normal sclera/conjunctiva [EOMI] : extra ocular movement intact [No Proptosis] : no proptosis [Clear to Auscultation] : lungs were clear to auscultation bilaterally [Normal S1, S2] : normal S1 and S2 [Normal Rate] : heart rate was normal [Regular Rhythm] : with a regular rhythm [No Edema] : no peripheral edema [Normal Bowel Sounds] : normal bowel sounds [Not Tender] : non-tender [Not Distended] : not distended [Soft] : abdomen soft [Normal Anterior Cervical Nodes] : no anterior cervical lymphadenopathy [No Spinal Tenderness] : no spinal tenderness [Spine Straight] : spine straight [No Stigmata of Cushings Syndrome] : no stigmata of Cushings Syndrome [Normal Gait] : normal gait [Normal Reflexes] : deep tendon reflexes were 2+ and symmetric [No Tremors] : no tremors [Oriented x3] : oriented to person, place, and time [de-identified] : fullness

## 2021-06-02 NOTE — PROCEDURE
[FreeTextEntry1] : Thyroid US - 06/02/2021\par Left: simple cyst 10 mm x 7 mm x 10 mm, several subcentimeter cysts\par Right: spongiform nodule 7 mm x 6 mm x 10 mm

## 2021-06-02 NOTE — HISTORY OF PRESENT ILLNESS
[Calcium (dietary)] : dietary Calcium [Vitamin D (oral)] : Vitamin D orally [Kidney Stones] : a history of kidney stones [FreeTextEntry1] : Pt states she was told of progression to osteoporosis after recent BMD 10/2020. She has not started any osteoporosis rx, was recommended to start Prolia. 0 falls in the last 6 months. No fh/o osteoporosis. H/o . H/o asthma. No h/o fx. \par H/o kidney stones, previously passed 1 stone, one persistent 3 mm stone in situ, managed by Dr. Manuel Oleary. Did not have a 24 hr urine Ca test.  Does not know type of kidney stone.\par . No ulcers or bleeding. No unusual risks for osteoporosis. No h/o RT. No chronic prednisone use. No h/o Paget's disease. Menopause ~50. No HRT use. Up to date with mammography and GYN. Diet includes moderate dairy. Pt takes Ca 1400 mg and Vitamin D 2000 IU daily.\par \par Bone mineral density: 10/2020\par Indication: outside study ZP\par Spine: read as -1.6 not accurate due to arthritis in L3-L4, L1 -2.3 and L2 -2.7, Avg L1-L2 -2.5 osteoporosis\par L Total hip: -0.7 normal, stable vs. 2018\par L Femoral neck: -1.7, osteopenia, stable vs. 2018 [Low Calcium Intake] : no low calcium intake [Low Vit D Intake/Exposure] : no low vitamin D intake [Premat. Menopause (natural)] : no history of premature menopause [Premat. Menopause (surgery)] : no history of premature surgical menopause [Taking Steroids] : no history of taking steroids [Hyperparathyroidism] : no history of hyperparathyroidism [Diabetes] : no history of diabetes [Testosterone Deficiency] : no testosterone deficiency [Excessive Alcohol Intake] : no excessive alcohol intake [Excessive Soda] : no excessive soda [Sedentary] : no sedentary lifestyle [Previous Fragility Fracture] : no previous fragility fracture [Family History of Breast Cancer] : no family history of breast cancer [Family History of Hip Fracture] : no family history of hip fracture [Family History of Osteoporosis] : no family history of osteoporosis [History of Radiation Therapy] : no history of radiation therapy [History of Blood Clots] : no history of blood clots [High Fall Risk] : no fall risk [Frequent Falls] : no frequent falls [Uses a Walker/Cane] : no use of a walker/cane

## 2021-06-02 NOTE — END OF VISIT
[FreeTextEntry3] : I, Saeid Garner, authored this note working as a medical scribe for Dr. Delgado.  06/02/2021.  4:00PM. This note was authored by the medical scribe for me. I have reviewed, edited, and revised the note as needed. I am in agreement with the exam findings, imaging findings, and treatment plan.  Bi Delgado MD

## 2021-06-07 LAB
24R-OH-CALCIDIOL SERPL-MCNC: 81.5 PG/ML
25(OH)D3 SERPL-MCNC: 63.2 NG/ML
ALBUMIN SERPL ELPH-MCNC: 4.2 G/DL
ALP BLD-CCNC: 94 U/L
ALT SERPL-CCNC: 19 U/L
ANION GAP SERPL CALC-SCNC: 13 MMOL/L
AST SERPL-CCNC: 16 U/L
BILIRUB SERPL-MCNC: 0.2 MG/DL
BUN SERPL-MCNC: 15 MG/DL
CALCIUM SERPL-MCNC: 9.4 MG/DL
CALCIUM SERPL-MCNC: 9.4 MG/DL
CHLORIDE SERPL-SCNC: 107 MMOL/L
CO2 SERPL-SCNC: 25 MMOL/L
CREAT SERPL-MCNC: 0.72 MG/DL
GLUCOSE SERPL-MCNC: 89 MG/DL
PARATHYROID HORMONE INTACT: 23 PG/ML
PHOSPHATE SERPL-MCNC: 3.1 MG/DL
POTASSIUM SERPL-SCNC: 4.2 MMOL/L
PROT SERPL-MCNC: 7 G/DL
SODIUM SERPL-SCNC: 144 MMOL/L
TSH SERPL-ACNC: 0.78 UIU/ML

## 2021-06-11 ENCOUNTER — NON-APPOINTMENT (OUTPATIENT)
Age: 66
End: 2021-06-11

## 2021-06-28 ENCOUNTER — APPOINTMENT (OUTPATIENT)
Dept: CARDIOLOGY | Facility: CLINIC | Age: 66
End: 2021-06-28
Payer: COMMERCIAL

## 2021-06-28 PROCEDURE — 78452 HT MUSCLE IMAGE SPECT MULT: CPT

## 2021-06-28 PROCEDURE — A9500: CPT

## 2021-06-28 PROCEDURE — 99072 ADDL SUPL MATRL&STAF TM PHE: CPT

## 2021-06-28 PROCEDURE — 93015 CV STRESS TEST SUPVJ I&R: CPT

## 2021-07-06 ENCOUNTER — APPOINTMENT (OUTPATIENT)
Dept: PULMONOLOGY | Facility: CLINIC | Age: 66
End: 2021-07-06

## 2021-07-09 ENCOUNTER — APPOINTMENT (OUTPATIENT)
Dept: PULMONOLOGY | Facility: CLINIC | Age: 66
End: 2021-07-09
Payer: COMMERCIAL

## 2021-07-09 ENCOUNTER — NON-APPOINTMENT (OUTPATIENT)
Age: 66
End: 2021-07-09

## 2021-07-09 VITALS
SYSTOLIC BLOOD PRESSURE: 110 MMHG | HEART RATE: 80 BPM | OXYGEN SATURATION: 98 % | BODY MASS INDEX: 30.18 KG/M2 | RESPIRATION RATE: 16 BRPM | DIASTOLIC BLOOD PRESSURE: 78 MMHG | WEIGHT: 164 LBS | TEMPERATURE: 97.4 F | HEIGHT: 62 IN

## 2021-07-09 PROCEDURE — 94010 BREATHING CAPACITY TEST: CPT

## 2021-07-09 PROCEDURE — 99072 ADDL SUPL MATRL&STAF TM PHE: CPT

## 2021-07-09 PROCEDURE — 99214 OFFICE O/P EST MOD 30 MIN: CPT | Mod: 25

## 2021-07-09 PROCEDURE — 95012 NITRIC OXIDE EXP GAS DETER: CPT

## 2021-07-09 NOTE — HISTORY OF PRESENT ILLNESS
[TextBox_4] : Ms. CASIANO is a 65 year old female with a history of allergies, GERD, overweight, COLLINS, RADS, Covid-19 pneumonitis, post nasal drip presenting to the office today for a follow up pulmonary evaluation. Her chief complaint is \par - she notes on Wednesday her breathing was very labored, and she was able to move around and about \par - she notes she did not take her nasal spray for her post nasal drip \par - she notes yesterday she she felt better and today she feels better than Wednesday \par - she notes she took a stress test and managed to finish it \par - she notes she finds herself gasping for air \par - she has been taking a generic of Advair and her nasal spray \par - has been taking famotidine for her heart burn \par - she takes a 14 days Prilosec every 6 months \par - She  denies any visual issues, headaches, nausea, vomiting, fever, chills, sweats, chest pains, chest pressure, diarrhea, constipation, dysphagia, myalgia, dizziness, leg swelling, leg pain, itchy eyes, itchy ears, heartburn, reflux, or sour taste in the mouth.\par

## 2021-07-09 NOTE — ASSESSMENT
[FreeTextEntry1] : Ms. CASIANO is a 65 year old female with a history of  allergies, GERD, overweight, COLLINS, RADS, Covid-19 pneumonitis, post nasal drip who comes into the office today for pulmonary evaluation s/p COVID -19 pneumonitis with residual SOB (better with meds) - break through of asthma / allergy, less likely PE. \par \par \par The patient's shortness of breath is multifactorial due to:\par -pulmonary disease \par      - Asthmatic symptoms \par - s/p COVID -19 pneumonitis \par -poor breathing mechanics \par -overweight/out of shape\par -?cardiac disease (doubt) \par \par \par Problem 1: s/p covid -19 pneumonitis (resolved) ?PE\par -2nd vaccination (Pfizer). \par - recommend NAC 900mg BID\par - Obtain f/u CT scan in 6/2021 \par - get CTPA \par \par Problem 2: RAD \par - continue Generic Advair 113 2 Puffs BID / -add Spiriva at 2 inhalations QAM \par -add Singulair 10 mg QHS \par - s/p Prednisone 20mg x 7 days and 10mg x 7 days \par -Asthma is believed to be caused by inherited (genetic) and environmental factor, but its exact cause is unknown. Asthma may be triggered by allergens, lung infections, or irritants in the air. Asthma triggers are different for each person\par \par Information sheet given to the patient to be reviewed, this medication is never to be used without consulting the prescribing physician. Proper dietary restraint is necessary specifically salt containing foods, if any reaction may occur should be reported. \par \par \par Problem 3 : Non allergic/ gustatory rhinitis  & sinusitis \par -Add Atrovent (0.6) nasal spray 2 sniff BID \par - Add Qnasl 1 sniff each nostril BID \par -add Clarinex 5 mg QAM \par - Environmental measures for allergies were encouraged including mattress and pillow cover, air purifier, and environmental controls. \par \par Problem 4 :OSAS\par - Continue CPAP machine \par \par Sleep apnea is associated with adverse clinical consequences which an affect most organ systems. Cardiovascular disease risk includes arrhythmias, atrial fibrillation, hypertension, coronary artery disease, and stroke. Metabolic disorders include diabetes type 2, non-alcoholic fatty liver disease. Mood disorder especially depression; and cognitive decline especially in the elderly. Associations with chronic reflux/Weston’s esophagus some but not all inclusive. \par -Reasons include arousal consistent with hypopnea; respiratory events most prominent in REM sleep or supine position; therefore sleep staging and body position are important for accurate diagnosis and estimation of AHI. \par \par According to the National Heart, Lung and Blood [place], CPAP or continuous positive airway pressure is a treatment that uses mild air pressure to keep breathing airways open. A CPAP machine includes a mask or other device that fits over the nose or nose and mouth. The mask is connected to a machine via the tube through which humidified air is blown. In the cases of obstructive sleep apnea, CPAP can reverse the complete blockages or narrowing of upper airways. Following diagnosis, CPAP machine pressures can be determined by a CPAP titration. Individuals who require CPAP can choose among masks and equipment that meet prescription and maximize comfort. Many become accustomed right away while others could require more time. Problems include uncomfortable masks or air leakage which can be adjusted to optimize compliance. \par \par \par  problem 5: GERD\par -Continue Pepcid 40 mg QHS \par -Rule of 2s: avoid eating too much, eating too fast, eating too late, eating too spicy, eating too lousy, eating two hours before bed.\par -Things to avoid including overeating, spicy foods, tight clothing, eating within three hours of bed, this list is not all inclusive. \par -For treatment of reflux, possible options discussed including diet control, H2 blockers, PPIs, as well as coating motility agents discussed as treatment options. Timing of meals and proximity of last meal to sleep were discussed. If symptoms persist, a formal gastrointestinal evaluation is needed.\par \par \par Problem : Poor Mechanics of Breathing\par - Proper breathing techniques were reviewed with an emphasis of exhalation. Patient instructed to breath in for 1 second and out for four seconds. Patient was encouraged to not talk while walking. \par \par Problem : Overweight\par -Weight loss, exercise, and diet control were discussed and are highly encouraged. Treatment options were given such as, aqua therapy, and contacting a nutritionist. Recommended to use the elliptical, stationary bike, less use of treadmill. Mindful eating was explained to the patient Obesity is associated with worsening asthma, shortness of breath, and potential for cardiac disease, diabetes, and other underlying medical conditions. \par \par Problem : Cardiac\par -recommended to follow up with a cardiologist\par \par Problem : health maintenance\par -s/p influenza vaccine\par -recommended strep pneumonia vaccines after age 65: Prevnar-13 vaccine, followed by Pneumo vaccine 23 one year following\par -recommended early intervention for URIs\par -recommended regular osteoporosis evaluations\par -recommended early dermatological evaluations\par -recommended after the age of 50 to the age of 70, colonoscopy every 5 years \par \par  Follow up in 6-8 weeks\par -he  is recommended to call with any changes, questions, or concerns.

## 2021-07-09 NOTE — REVIEW OF SYSTEMS
[Recent Wt Gain (___ Lbs)] : ~T recent [unfilled] lb weight gain [Dyspnea] : dyspnea [SOB on Exertion] : sob on exertion [Negative] : Endocrine [Postnasal Drip] : no postnasal drip [Cough] : no cough [Chest Discomfort] : no chest discomfort [GERD] : no gerd [Diarrhea] : no diarrhea [Constipation] : no constipation [Dysphagia] : no dysphagia

## 2021-07-09 NOTE — PROCEDURE
[FreeTextEntry1] : PFT reveals normal flows, with an FEV1 of   1.81L, which is  95% of predicted, with normal flow volume loop \par \par  FENO was 35 ; normal value being less than 25\par Fractional exhaled nitric oxide (FENO) is regarded as a simple, noninvasive method for assessing eosinophilic airway inflammation. Produced by a variety of cells within the lung, nitric oxide (NO) concentrations are generally low in healthy individuals. However, high concentrations of NO appear to be involved in nonspecific host defense mechanisms and chronic inflammatory diseases such as asthma. The American Thoracic Society (ATS) therefore has recommended using FENO to aid in the diagnosis and monitoring of eosinophilic airway inflammation and asthma, and for identifying steroid responsive individuals whose chronic respiratory symptoms may be airway inflammation.

## 2021-07-09 NOTE — ADDENDUM
[FreeTextEntry1] : Documented by Sandi Mcrae acting as a scribe for Dr. Balta Verma on 07/09/2021 \par \par All medical record entries made by the Scribe were at my, Dr. Balta Verma's, direction and personally dictated by me on 07/09/2021 . I have reviewed the chart and agree that the record accurately reflects my personal performance of the history, physical exam, assessment and plan. I have also personally directed, reviewed, and agree with the discharge instructions.

## 2021-07-26 ENCOUNTER — OUTPATIENT (OUTPATIENT)
Dept: OUTPATIENT SERVICES | Facility: HOSPITAL | Age: 66
LOS: 1 days | End: 2021-07-26
Payer: COMMERCIAL

## 2021-07-26 ENCOUNTER — APPOINTMENT (OUTPATIENT)
Dept: CT IMAGING | Facility: CLINIC | Age: 66
End: 2021-07-26
Payer: COMMERCIAL

## 2021-07-26 DIAGNOSIS — Z98.891 HISTORY OF UTERINE SCAR FROM PREVIOUS SURGERY: Chronic | ICD-10-CM

## 2021-07-26 DIAGNOSIS — Z00.8 ENCOUNTER FOR OTHER GENERAL EXAMINATION: ICD-10-CM

## 2021-07-26 PROCEDURE — 75574 CT ANGIO HRT W/3D IMAGE: CPT

## 2021-07-26 PROCEDURE — 82565 ASSAY OF CREATININE: CPT

## 2021-07-26 PROCEDURE — 75574 CT ANGIO HRT W/3D IMAGE: CPT | Mod: 26

## 2021-07-27 ENCOUNTER — OUTPATIENT (OUTPATIENT)
Dept: OUTPATIENT SERVICES | Facility: HOSPITAL | Age: 66
LOS: 1 days | End: 2021-07-27
Payer: COMMERCIAL

## 2021-07-27 ENCOUNTER — APPOINTMENT (OUTPATIENT)
Dept: CT IMAGING | Facility: CLINIC | Age: 66
End: 2021-07-27
Payer: COMMERCIAL

## 2021-07-27 DIAGNOSIS — R94.39 ABNORMAL RESULT OF OTHER CARDIOVASCULAR FUNCTION STUDY: ICD-10-CM

## 2021-07-27 DIAGNOSIS — Z98.891 HISTORY OF UTERINE SCAR FROM PREVIOUS SURGERY: Chronic | ICD-10-CM

## 2021-07-27 DIAGNOSIS — Z00.8 ENCOUNTER FOR OTHER GENERAL EXAMINATION: ICD-10-CM

## 2021-07-27 PROCEDURE — 82565 ASSAY OF CREATININE: CPT

## 2021-07-27 PROCEDURE — 71275 CT ANGIOGRAPHY CHEST: CPT | Mod: 26

## 2021-07-27 PROCEDURE — 71275 CT ANGIOGRAPHY CHEST: CPT

## 2021-07-28 ENCOUNTER — TRANSCRIPTION ENCOUNTER (OUTPATIENT)
Age: 66
End: 2021-07-28

## 2021-07-31 ENCOUNTER — EMERGENCY (EMERGENCY)
Facility: HOSPITAL | Age: 66
LOS: 1 days | Discharge: HOME | End: 2021-07-31
Attending: EMERGENCY MEDICINE | Admitting: EMERGENCY MEDICINE
Payer: COMMERCIAL

## 2021-07-31 VITALS
HEIGHT: 63 IN | OXYGEN SATURATION: 99 % | WEIGHT: 162.04 LBS | HEART RATE: 82 BPM | RESPIRATION RATE: 18 BRPM | SYSTOLIC BLOOD PRESSURE: 165 MMHG | TEMPERATURE: 98 F | DIASTOLIC BLOOD PRESSURE: 92 MMHG

## 2021-07-31 DIAGNOSIS — Z98.891 HISTORY OF UTERINE SCAR FROM PREVIOUS SURGERY: Chronic | ICD-10-CM

## 2021-07-31 PROCEDURE — 99282 EMERGENCY DEPT VISIT SF MDM: CPT

## 2021-07-31 PROCEDURE — 99283 EMERGENCY DEPT VISIT LOW MDM: CPT

## 2021-07-31 NOTE — ED PROVIDER NOTE - NSFOLLOWUPINSTRUCTIONS_ED_ALL_ED_FT
go to the eye clinic at [address] [place] [address] as arranged by Dr Valerio   he will call you tonight at the phone numbers you provided at registration  if you do not hear from him tonight or have worsening symptoms call him at [phone]  avoid eye strain  return for worsening symptoms

## 2021-07-31 NOTE — ED PROVIDER NOTE - PROGRESS NOTE DETAILS
katelyn pen malfunctioning will not calibrate, admin notified we are getting katelyn pen from [facility], pt aware of delay pressures  Dina [phone] paged to arrange for follow up pressures  Valerio [phone] paged to arrange for follow up will seein am in clinic   for pt to call Te[facility] [facility]

## 2021-07-31 NOTE — ED PROVIDER NOTE - NSFOLLOWUPCLINICS_GEN_ALL_ED_FT
An Ophthalmologist  Ophthalmology  .  [place]   Phone:   Fax:     [facility] - Ophthalmology  Ophthalmology  [address], [address]  [place] [address]  Phone: [phone]  Fax:

## 2021-07-31 NOTE — ED PROVIDER NOTE - EYES, MLM
Clear bilaterally, pupils equal, round and reactive to light. vision with snellen card 14 inches corrected is 20/15 ou, no cell flare hyphema fundus visible no exudate visible edges of retina not visible, the pressures are unable to obtain at this time due to faulty equipement

## 2021-07-31 NOTE — ED PROVIDER NOTE - CHPI ED SYMPTOMS NEG
no blurred vision/no discharge/no drainage/no itching/no pain/no photophobia/no purulent drainage/no double vision/no eye lid swelling/no foreign body

## 2021-07-31 NOTE — ED PROVIDER NOTE - PATIENT PORTAL LINK FT
You can access the FollowMyHealth Patient Portal offered by [facility] by registering at the following website: [email or web address]. By joining [employer]’s FollowMyHealth portal, you will also be able to view your health information using other applications (apps) compatible with our system.

## 2021-07-31 NOTE — ED PROVIDER NOTE - OBJECTIVE STATEMENT
IV removed, discharge instructions and prescriptions given to patient.      Wilda Escoto RN  04/26/20 [id number]
pt is a 64 yo f whose pmd is dr Curtis Manning pulm dr jean palacios cardiology dr Kenyon Valiente and optho dr Sofia Turcios has hx of long covid sp covid in jan 2020, [facility] allergy anxiety sp c section on multiple inhalers and rad meds who began to notice sharp light and intermittent floaters in her left eye 2 nights ago.  this am they came back and started small as fine lines then as large markers on and off are gone at the time of evaluation  no eye pain no vision loss no head pain fearing retinal detachment she came to er for evaluation.

## 2021-07-31 NOTE — ED PROVIDER NOTE - CARE PROVIDER_API CALL
Sofia Turcios (MD)  Ophthalmology  [address] [place] [address], [address]  [place] [address]  Phone: [phone]  Fax: [phone]  Follow Up Time:

## 2021-07-31 NOTE — ED PROVIDER NOTE - CONSTITUTIONAL, MLM
normal... Well appearing, awake, alert, oriented to person, place, time/situation and in no apparent distress. with an active tick due to nervousness

## 2021-08-02 ENCOUNTER — APPOINTMENT (OUTPATIENT)
Dept: CT IMAGING | Facility: CLINIC | Age: 66
End: 2021-08-02

## 2021-08-03 ENCOUNTER — APPOINTMENT (OUTPATIENT)
Dept: INTERNAL MEDICINE | Facility: CLINIC | Age: 66
End: 2021-08-03
Payer: COMMERCIAL

## 2021-08-03 VITALS — RESPIRATION RATE: 14 BRPM | HEART RATE: 76 BPM | DIASTOLIC BLOOD PRESSURE: 70 MMHG | SYSTOLIC BLOOD PRESSURE: 118 MMHG

## 2021-08-03 PROCEDURE — 99213 OFFICE O/P EST LOW 20 MIN: CPT

## 2021-08-03 RX ORDER — TAMSULOSIN HYDROCHLORIDE 0.4 MG/1
0.4 CAPSULE ORAL DAILY
Qty: 30 | Refills: 3 | Status: DISCONTINUED | COMMUNITY
Start: 2020-06-25 | End: 2021-08-03

## 2021-08-03 NOTE — ASSESSMENT
[FreeTextEntry1] : Goal blood pressure /90\par Not at goal\par Urged to limit sodium intake\par Urged to maintain diet and exercise habits to keep BMI < 28\par Limit alcohol consumption to < 7drinks per week\par Reinforced adherence to medication regimen\par \par 24 minutes spent in preparation of this visit, including review of previous notes and test results, interview and examination of patient, discussion of plan, arranging for appropriate testing and treatment, and documentation.\par \par

## 2021-08-03 NOTE — HISTORY OF PRESENT ILLNESS
[de-identified] : Ms. CASIANO comes in for reassessment of hypertension. She denies edema, chest pain, dizziness, EASON, PND and orthopnea.  She  has had no problems with her medications.\par

## 2021-08-03 NOTE — HEALTH RISK ASSESSMENT
[0] : 2) Feeling down, depressed, or hopeless: Not at all (0) [PHQ-2 Negative - No further assessment needed] : PHQ-2 Negative - No further assessment needed [[id number]] : 0

## 2021-08-05 ENCOUNTER — APPOINTMENT (OUTPATIENT)
Dept: OPHTHALMOLOGY | Facility: CLINIC | Age: 66
End: 2021-08-05
Payer: COMMERCIAL

## 2021-08-05 ENCOUNTER — NON-APPOINTMENT (OUTPATIENT)
Age: 66
End: 2021-08-05

## 2021-08-05 PROCEDURE — 92014 COMPRE OPH EXAM EST PT 1/>: CPT

## 2021-11-03 ENCOUNTER — APPOINTMENT (OUTPATIENT)
Dept: PULMONOLOGY | Facility: CLINIC | Age: 66
End: 2021-11-03
Payer: COMMERCIAL

## 2021-11-03 VITALS
HEIGHT: 62 IN | DIASTOLIC BLOOD PRESSURE: 80 MMHG | BODY MASS INDEX: 30.91 KG/M2 | HEART RATE: 99 BPM | TEMPERATURE: 96.9 F | SYSTOLIC BLOOD PRESSURE: 114 MMHG | RESPIRATION RATE: 16 BRPM | WEIGHT: 168 LBS | OXYGEN SATURATION: 99 %

## 2021-11-03 PROCEDURE — 99214 OFFICE O/P EST MOD 30 MIN: CPT

## 2021-11-03 NOTE — PHYSICAL EXAM
[No Acute Distress] : no acute distress [Normal Oropharynx] : normal oropharynx [III] : Mallampati Class: III [Normal Appearance] : normal appearance [No Neck Mass] : no neck mass [Normal Rate/Rhythm] : normal rate/rhythm [Normal S1, S2] : normal s1, s2 [No Murmurs] : no murmurs [No Resp Distress] : no resp distress [Clear to Auscultation Bilaterally] : clear to auscultation bilaterally [No Abnormalities] : no abnormalities [Benign] : benign [Normal Gait] : normal gait [No Clubbing] : no clubbing [No Cyanosis] : no cyanosis [No Edema] : no edema [FROM] : FROM [Normal Color/ Pigmentation] : normal color/ pigmentation [No Focal Deficits] : no focal deficits [Oriented x3] : oriented x3 [Normal Affect] : normal affect [TextBox_2] : overweight [TextBox_68] : I:E ratio 1:3; mild expiratory wheeze

## 2021-11-03 NOTE — ASSESSMENT
[FreeTextEntry1] : Ms. CASIANO is a 65 year old female with a history of  allergies, GERD, overweight, COLLINS, RADS, Covid-19 pneumonitis, post nasal drip who comes into the office today for pulmonary evaluation s/p COVID -19 pneumonitis with residual SOB (better with meds) -semicompliant \par \par \par The patient's shortness of breath is multifactorial due to:\par -pulmonary disease \par      - Asthmatic symptoms \par - s/p COVID -19 pneumonitis \par -poor breathing mechanics \par -overweight/out of shape\par -?cardiac disease (doubt) \par \par \par Problem 1: s/p covid -19 pneumonitis (resolved) ?PE\par -2nd vaccination (Pfizer). \par - recommend NAC 900mg BID\par - Obtain f/u CT scan in 6/2021 \par - s/p CTPA -next 6/2022 (improved COVID-19 PNA) \par \par Problem 2: RADS \par - continue Generic Advair 113 2 Puffs BID / -add Spiriva at 2 inhalations QAM (PRN)\par -add Singulair 10 mg QHS \par -Asthma is believed to be caused by inherited (genetic) and environmental factor, but its exact cause is unknown. Asthma may be triggered by allergens, lung infections, or irritants in the air. Asthma triggers are different for each person\par \par Information sheet given to the patient to be reviewed, this medication is never to be used without consulting the prescribing physician. Proper dietary restraint is necessary specifically salt containing foods, if any reaction may occur should be reported. \par \par \par Problem 3 : Non allergic/ gustatory rhinitis  & sinusitis \par -Add Atrovent (0.6) nasal spray 2 sniff BID \par - Add Qnasl 1 sniff each nostril BID or Add Flonase 1 sniff/nostril BID \par -continue Clarinex 5 mg QAM \par - Environmental measures for allergies were encouraged including mattress and pillow cover, air purifier, and environmental controls. \par \par Problem 4 :OSAS\par - Continue CPAP machine \par \par Sleep apnea is associated with adverse clinical consequences which an affect most organ systems. Cardiovascular disease risk includes arrhythmias, atrial fibrillation, hypertension, coronary artery disease, and stroke. Metabolic disorders include diabetes type 2, non-alcoholic fatty liver disease. Mood disorder especially depression; and cognitive decline especially in the elderly. Associations with chronic reflux/Weston’s esophagus some but not all inclusive. \par -Reasons include arousal consistent with hypopnea; respiratory events most prominent in REM sleep or supine position; therefore sleep staging and body position are important for accurate diagnosis and estimation of AHI. \par \par According to the National Heart, Lung and Blood [place], CPAP or continuous positive airway pressure is a treatment that uses mild air pressure to keep breathing airways open. A CPAP machine includes a mask or other device that fits over the nose or nose and mouth. The mask is connected to a machine via the tube through which humidified air is blown. In the cases of obstructive sleep apnea, CPAP can reverse the complete blockages or narrowing of upper airways. Following diagnosis, CPAP machine pressures can be determined by a CPAP titration. Individuals who require CPAP can choose among masks and equipment that meet prescription and maximize comfort. Many become accustomed right away while others could require more time. Problems include uncomfortable masks or air leakage which can be adjusted to optimize compliance. \par \par \par  problem 5: GERD\par -Continue Pepcid 40 mg QHS \par -Rule of 2s: avoid eating too much, eating too fast, eating too late, eating too spicy, eating too lousy, eating two hours before bed.\par -Things to avoid including overeating, spicy foods, tight clothing, eating within three hours of bed, this list is not all inclusive. \par -For treatment of reflux, possible options discussed including diet control, H2 blockers, PPIs, as well as coating motility agents discussed as treatment options. Timing of meals and proximity of last meal to sleep were discussed. If symptoms persist, a formal gastrointestinal evaluation is needed.\par \par \par Problem : Poor Mechanics of Breathing\par - Proper breathing techniques were reviewed with an emphasis of exhalation. Patient instructed to breath in for 1 second and out for four seconds. Patient was encouraged to not talk while walking. \par \par Problem : Overweight\par -Recommend "Muniq" OTC Supplement \par -Weight loss, exercise, and diet control were discussed and are highly encouraged. Treatment options were given such as, aqua therapy, and contacting a nutritionist. Recommended to use the elliptical, stationary bike, less use of treadmill. Mindful eating was explained to the patient Obesity is associated with worsening asthma, shortness of breath, and potential for cardiac disease, diabetes, and other underlying medical conditions. \par \par Problem : Cardiac\par -recommended to follow up with a cardiologist\par \par Problem : health maintenance\par -s/p influenza vaccine 2021 \par -recommended strep pneumonia vaccines after age 65: Prevnar-13 vaccine, followed by Pneumo vaccine 23 one year following\par -recommended early intervention for URIs\par -recommended regular osteoporosis evaluations\par -recommended early dermatological evaluations\par -recommended after the age of 50 to the age of 70, colonoscopy every 5 years \par \par  Follow up in 6-8 weeks\par -he  is recommended to call with any changes, questions, or concerns.

## 2021-11-03 NOTE — ADDENDUM
[FreeTextEntry1] : Documented by Davis Maguire acting as a scribe for Dr. Balta Verma on (11/03/2021).\par \par All medical record entries made by the Scribe were at my, Dr. Balta Verma's, direction and personally dictated by me on (11/03/2021). I have reviewed the chart and agree that the record accurately reflects my personal performance of the history, physical exam, assessment and plan. I have also personally directed, reviewed, and agree with the discharge instructions.\par

## 2021-11-03 NOTE — HISTORY OF PRESENT ILLNESS
[TextBox_4] : Ms. CASIANO is a 65 year old female with a history of allergies, GERD, overweight, COLLINS, RADS, Covid-19 pneumonitis, post nasal drip presenting to the office today for a follow up pulmonary evaluation. Her chief complaint is \par -she notes her breathing is fluctuated \par -she sometimes feels chest heaviness sometimes \par -she notes being on famotadine \par -she notes having burping spouts from famotidine \par -she notes gaining some weight \par -she notes still getting PND \par -she notes sense of smell and taste are still good\par -she notes having floaters with no retinal hemorrhage \par -she notes taking singulair and clarinex \par -she notes using inhalers \par -she notes her eczema is better \par -she notes concerns regarding albuterol \par patient denies any headaches, nausea, vomiting, fever, chills, sweats, chest pain, chest pressure, palpitations, coughing, wheezing, fatigue, diarrhea, constipation, dysphagia, myalgias, dizziness, leg swelling, leg pain, itchy eyes, itchy ears, heartburn, reflux or sour taste in the mouth

## 2021-11-03 NOTE — PROCEDURE
[FreeTextEntry1] : CT angio chest (7.27.2021) revealed No pulmonary embolism. Interval improvement in previously seen groundglass opacities at the posterior aspect of the lungs, differential includes sequela of prior COVID 19 infection with additional new areas of groundglass attenuation at the posterior aspect of the lungs which can be dependent changes/atelectasis.

## 2021-11-20 ENCOUNTER — RX RENEWAL (OUTPATIENT)
Age: 66
End: 2021-11-20

## 2021-12-05 LAB
25(OH)D3 SERPL-MCNC: 44.8 NG/ML
ALBUMIN SERPL ELPH-MCNC: 4.2 G/DL
ALP BLD-CCNC: 85 U/L
ALT SERPL-CCNC: 24 U/L
ANION GAP SERPL CALC-SCNC: 14 MMOL/L
AST SERPL-CCNC: 23 U/L
BASOPHILS # BLD AUTO: 0.02 K/UL
BASOPHILS NFR BLD AUTO: 0.4 %
BILIRUB SERPL-MCNC: 0.4 MG/DL
BUN SERPL-MCNC: 10 MG/DL
CALCIUM SERPL-MCNC: 9.1 MG/DL
CHLORIDE SERPL-SCNC: 104 MMOL/L
CHOLEST SERPL-MCNC: 170 MG/DL
CO2 SERPL-SCNC: 23 MMOL/L
COVID-19 NUCLEOCAPSID  GAM ANTIBODY INTERPRETATION: POSITIVE
COVID-19 SPIKE DOMAIN ANTIBODY INTERPRETATION: POSITIVE
CREAT SERPL-MCNC: 0.63 MG/DL
EOSINOPHIL # BLD AUTO: 0.07 K/UL
EOSINOPHIL NFR BLD AUTO: 1.4 %
ESTIMATED AVERAGE GLUCOSE: 126 MG/DL
GLUCOSE SERPL-MCNC: 102 MG/DL
HBA1C MFR BLD HPLC: 6 %
HCT VFR BLD CALC: 42.1 %
HDLC SERPL-MCNC: 58 MG/DL
HGB BLD-MCNC: 13.4 G/DL
IMM GRANULOCYTES NFR BLD AUTO: 0.2 %
LDLC SERPL CALC-MCNC: 94 MG/DL
LDLC SERPL DIRECT ASSAY-MCNC: 101 MG/DL
LYMPHOCYTES # BLD AUTO: 1.63 K/UL
LYMPHOCYTES NFR BLD AUTO: 31.5 %
MAN DIFF?: NORMAL
MCHC RBC-ENTMCNC: 29.6 PG
MCHC RBC-ENTMCNC: 31.8 GM/DL
MCV RBC AUTO: 93.1 FL
MONOCYTES # BLD AUTO: 0.51 K/UL
MONOCYTES NFR BLD AUTO: 9.9 %
NEUTROPHILS # BLD AUTO: 2.93 K/UL
NEUTROPHILS NFR BLD AUTO: 56.6 %
NONHDLC SERPL-MCNC: 112 MG/DL
PLATELET # BLD AUTO: 247 K/UL
POTASSIUM SERPL-SCNC: 4 MMOL/L
PROT SERPL-MCNC: 7 G/DL
RBC # BLD: 4.52 M/UL
RBC # FLD: 12.6 %
SARS-COV-2 AB SERPL IA-ACNC: >250 U/ML
SARS-COV-2 AB SERPL QL IA: 84.4 INDEX
SODIUM SERPL-SCNC: 141 MMOL/L
TRIGL SERPL-MCNC: 90 MG/DL
WBC # FLD AUTO: 5.17 K/UL

## 2021-12-08 ENCOUNTER — APPOINTMENT (OUTPATIENT)
Dept: INTERNAL MEDICINE | Facility: CLINIC | Age: 66
End: 2021-12-08
Payer: COMMERCIAL

## 2021-12-08 PROCEDURE — 99397 PER PM REEVAL EST PAT 65+ YR: CPT

## 2021-12-09 VITALS
DIASTOLIC BLOOD PRESSURE: 68 MMHG | RESPIRATION RATE: 14 BRPM | BODY MASS INDEX: 32.01 KG/M2 | WEIGHT: 175 LBS | SYSTOLIC BLOOD PRESSURE: 114 MMHG | HEART RATE: 78 BPM

## 2021-12-09 NOTE — HISTORY OF PRESENT ILLNESS
[FreeTextEntry1] : Ms. CASIANO is here for an annual physical examination and assessment.\par  [de-identified] : She generally feels well with no specific complaints. Her recent health has been good.\par Continues with residual COVID symptoms.  Seeing pulmonary. \par \par Denies headache, dizziness.\par Denies rash, fatigue, fever, weight loss, anorexia.\par Denies cough, wheezing.\par Denies CP, SOB, EASON, edema, palpitations.\par Denies abdominal pain, N/V/D/C. Denies BRBPR, melena, dysphagia.\par Denies dysuria, frequency, hematuria, hesitancy.\par Denies weakness, numbness, gait instability.\par

## 2021-12-09 NOTE — HEALTH RISK ASSESSMENT
[Excellent] : ~his/her~  mood as  excellent [Yes] : Yes [Monthly or less (1 pt)] : Monthly or less (1 point) [3 or 4 (1 pt)] : 3 or 4  (1 point) [Never (0 pts)] : Never (0 points) [No] : In the past 12 months have you used drugs other than those required for medical reasons? No [No falls in past year] : Patient reported no falls in the past year [0] : 2) Feeling down, depressed, or hopeless: Not at all (0) [PHQ-2 Negative - No further assessment needed] : PHQ-2 Negative - No further assessment needed [None] : None [With Family] : lives with family [Employed] : employed [] :  [Feels Safe at Home] : Feels safe at home [Fully functional (bathing, dressing, toileting, transferring, walking, feeding)] : Fully functional (bathing, dressing, toileting, transferring, walking, feeding) [Fully functional (using the telephone, shopping, preparing meals, housekeeping, doing laundry, using] : Fully functional and needs no help or supervision to perform IADLs (using the telephone, shopping, preparing meals, housekeeping, doing laundry, using transportation, managing medications and managing finances) [Reports normal functional visual acuity (ie: able to read med bottle)] : Reports normal functional visual acuity [Smoke Detector] : smoke detector [Seat Belt] :  uses seat belt [] : No [Audit-CScore] : 2 [[id number]] : 0 [Change in mental status noted] : No change in mental status noted [Language] : denies difficulty with language [Behavior] : denies difficulty with behavior [Learning/Retaining New Information] : denies difficulty learning/retaining new information [Handling Complex Tasks] : denies difficulty handling complex tasks [Reasoning] : denies difficulty with reasoning [Spatial Ability and Orientation] : denies difficulty with spatial ability and orientation [Reports changes in hearing] : Reports no changes in hearing [Reports changes in vision] : Reports no changes in vision [Reports changes in dental health] : Reports no changes in dental health

## 2022-01-01 LAB — SARS-COV-2 N GENE NPH QL NAA+PROBE: NOT DETECTED

## 2022-01-13 ENCOUNTER — RX RENEWAL (OUTPATIENT)
Age: 67
End: 2022-01-13

## 2022-02-18 ENCOUNTER — APPOINTMENT (OUTPATIENT)
Dept: INTERNAL MEDICINE | Facility: CLINIC | Age: 67
End: 2022-02-18
Payer: COMMERCIAL

## 2022-02-18 VITALS
BODY MASS INDEX: 32.2 KG/M2 | DIASTOLIC BLOOD PRESSURE: 80 MMHG | HEART RATE: 80 BPM | HEIGHT: 62 IN | WEIGHT: 175 LBS | RESPIRATION RATE: 14 BRPM | OXYGEN SATURATION: 98 % | SYSTOLIC BLOOD PRESSURE: 140 MMHG

## 2022-02-18 PROCEDURE — 99213 OFFICE O/P EST LOW 20 MIN: CPT

## 2022-02-18 RX ORDER — INHALER, ASSIST DEVICES
SPACER (EA) MISCELLANEOUS
Qty: 1 | Refills: 0 | Status: ACTIVE | COMMUNITY
Start: 2022-02-18 | End: 1900-01-01

## 2022-02-18 NOTE — ASSESSMENT
[FreeTextEntry1] : MAKSIM CASIANO  is a 66 year old  female  with history of  s/p COVID-19 infection in Jan 2021 and got tx with MAB infusion, residual COVID symptoms, COLLINS with CPAP, PND, RADS, SOB, Osteoporosis, GERD, obesity, preDM presented today for labored breathing.\par \par # labored breathing\par V/S: /80 HI 80 RR 14 SpO2 in RA 98-99%\par She has spirometer and pulse oximetry at home for monitor: reassured that clear lung sound and normal heart sound. \par If possible avoid allergen and dust during the house construction. \par Reviewed her medications and sent  Rx for AeroChamber for efficient inhalator use. Continue current regimen.\par She was hesitating to use atrovent nasal spray: explained she can use it for allergic rhinitis. \par Continue use CPAP at night for COLLINS. \par Acid reflux and belching : pt can increase pepcid to bid from qd and may try prilosec for short term.\par \par Reviewed note by cardiology \par 6/28/21 Stress test and ECHO: unremarkable.\par ECG: NSR 5/28/21\par \par f/u Pulmonary on 3/9/22 for PFT.\par Increase physical activity including frequent  brisk ambulation, healthy diet and lose body weight.\par Patient was advised to call us for any worsening sx or if no resolution.  \par RTC in 6 month.\par

## 2022-02-18 NOTE — HISTORY OF PRESENT ILLNESS
[FreeTextEntry8] : MAKSIM CASIANO  is a 66 year old  female  with history of  s/p COVID-19 infection in Jan 2021 and got tx with MAB infusion, COLLINS with CPAP, PND, RADS, SOB, Osteoporosis, GERD, obesity, preDM presented today for labored breathing. She reported that she did continue Advair 113 2 Puffs BID, Spiriva at 2 inhalations QAM (PRN) around lunch time and Singulair 10 mg QHS since seen by pulmonary Dr. Verma in Nov 2021. Her house has been under construction and it will take 3 more weeks to complete. She could not find other location to stay and using dust barrier to minimize the allergen and taking Clarinex 5 mg QAM and prn flonase. She did not use Atrovent (0.6) nasal spray 2 sniff BID that was added by pulm in Nov.  She still has to take deep breath to fill her lungs and belching a lot. Taking pepcid 1tab po qd for acid reflux. Will get PFT and see Dr. Verma on 3/9/22. She felt some breast bone pain that was reproducible with palpation. Denied radiating chest pain to chin, shoulder or arm.  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 She is physically inactive and barely exercise. No fever, chills, CP, leg swelling, n/v/c/d, abdominal pain.

## 2022-02-18 NOTE — REVIEW OF SYSTEMS
[FreeTextEntry2] : Constitutional:  no fever and no chills. \par Eyes:  no discharge. \par HEENT:  no earache. \par Cardiovascular:  no chest pain, no palpitations and no lower extremity edema. \par Respiratory:  no shortness of breath, no wheezing and no cough. \par Gastrointestinal:  no abdominal pain, no nausea and no vomiting. \par Genitourinary:  no dysuria. \par Musculoskeletal:  no joint pain. \par Integumentary:  no itching. \par Neurological:  no headache. \par Psychiatric:  not suicidal. \par Hematologic/Lymphatic:  no easy bleeding. [FreeTextEntry6] : see HPI

## 2022-02-18 NOTE — PHYSICAL EXAM
[No Respiratory Distress] : no respiratory distress  [No Accessory Muscle Use] : no accessory muscle use [Clear to Auscultation] : lungs were clear to auscultation bilaterally [Normal Rate] : normal rate  [Normal S1, S2] : normal S1 and S2 [Regular Rhythm] : with a regular rhythm [No Murmur] : no murmur heard [No Edema] : there was no peripheral edema [de-identified] : WDWN in NAD\par HEENT:  unremarkable\par Neck:  supple, no JVD, no LN\par Lungs:  CTA B/L, no W/R/R\par Heart:  Reg rate, +S1S2, no M/R/G\par Abdomen:  soft, NT, ND, +BS, no masses, no HS-megaly\par Genital: No pubic or genital lesions noted.\par Ext:  no C/C/E\par Neuro:  no focal deficits

## 2022-02-27 ENCOUNTER — EMERGENCY (EMERGENCY)
Facility: HOSPITAL | Age: 67
LOS: 1 days | Discharge: HOME | End: 2022-02-27
Attending: CLINIC/CENTER
Payer: COMMERCIAL

## 2022-02-27 VITALS
HEART RATE: 90 BPM | OXYGEN SATURATION: 100 % | SYSTOLIC BLOOD PRESSURE: 137 MMHG | RESPIRATION RATE: 20 BRPM | DIASTOLIC BLOOD PRESSURE: 80 MMHG

## 2022-02-27 VITALS
DIASTOLIC BLOOD PRESSURE: 91 MMHG | OXYGEN SATURATION: 99 % | HEIGHT: 63.5 IN | WEIGHT: 169.09 LBS | RESPIRATION RATE: 20 BRPM | SYSTOLIC BLOOD PRESSURE: 146 MMHG | HEART RATE: 94 BPM | TEMPERATURE: 97 F

## 2022-02-27 DIAGNOSIS — Z98.891 HISTORY OF UTERINE SCAR FROM PREVIOUS SURGERY: Chronic | ICD-10-CM

## 2022-02-27 LAB
ALBUMIN SERPL ELPH-MCNC: 4.2 G/DL — SIGNIFICANT CHANGE UP (ref 3.3–5)
ALP SERPL-CCNC: 101 U/L — SIGNIFICANT CHANGE UP (ref 40–120)
ALT FLD-CCNC: 18 U/L — SIGNIFICANT CHANGE UP (ref 10–45)
ANION GAP SERPL CALC-SCNC: 15 MMOL/L — SIGNIFICANT CHANGE UP (ref 5–17)
APPEARANCE UR: CLEAR — SIGNIFICANT CHANGE UP
AST SERPL-CCNC: 22 U/L — SIGNIFICANT CHANGE UP (ref 10–40)
BACTERIA # UR AUTO: NEGATIVE — SIGNIFICANT CHANGE UP
BASE EXCESS BLDV CALC-SCNC: -0.3 MMOL/L — SIGNIFICANT CHANGE UP (ref -2–2)
BASOPHILS # BLD AUTO: 0.03 K/UL — SIGNIFICANT CHANGE UP (ref 0–0.2)
BASOPHILS NFR BLD AUTO: 0.5 % — SIGNIFICANT CHANGE UP (ref 0–2)
BILIRUB SERPL-MCNC: 0.7 MG/DL — SIGNIFICANT CHANGE UP (ref 0.2–1.2)
BILIRUB UR-MCNC: NEGATIVE — SIGNIFICANT CHANGE UP
BLOOD GAS VENOUS - CREATININE: SIGNIFICANT CHANGE UP MG/DL (ref 0.5–1.3)
BUN SERPL-MCNC: 16 MG/DL — SIGNIFICANT CHANGE UP (ref 7–23)
CA-I SERPL-SCNC: 1.23 MMOL/L — SIGNIFICANT CHANGE UP (ref 1.15–1.33)
CALCIUM SERPL-MCNC: 10.3 MG/DL — SIGNIFICANT CHANGE UP (ref 8.4–10.5)
CHLORIDE BLDV-SCNC: 109 MMOL/L — HIGH (ref 96–108)
CHLORIDE SERPL-SCNC: 109 MMOL/L — HIGH (ref 96–108)
CO2 BLDV-SCNC: 20 MMOL/L — LOW (ref 22–26)
CO2 SERPL-SCNC: 17 MMOL/L — LOW (ref 22–31)
COLOR SPEC: SIGNIFICANT CHANGE UP
CREAT SERPL-MCNC: 0.63 MG/DL — SIGNIFICANT CHANGE UP (ref 0.5–1.3)
D DIMER BLD IA.RAPID-MCNC: <150 NG/ML DDU — SIGNIFICANT CHANGE UP
DIFF PNL FLD: ABNORMAL
EOSINOPHIL # BLD AUTO: 0.02 K/UL — SIGNIFICANT CHANGE UP (ref 0–0.5)
EOSINOPHIL NFR BLD AUTO: 0.4 % — SIGNIFICANT CHANGE UP (ref 0–6)
EPI CELLS # UR: 1 /HPF — SIGNIFICANT CHANGE UP
GAS PNL BLDV: 138 MMOL/L — SIGNIFICANT CHANGE UP (ref 136–145)
GAS PNL BLDV: SIGNIFICANT CHANGE UP
GAS PNL BLDV: SIGNIFICANT CHANGE UP
GLUCOSE BLDV-MCNC: 106 MG/DL — HIGH (ref 70–99)
GLUCOSE SERPL-MCNC: 102 MG/DL — HIGH (ref 70–99)
GLUCOSE UR QL: NEGATIVE — SIGNIFICANT CHANGE UP
HCO3 BLDV-SCNC: 19 MMOL/L — LOW (ref 22–29)
HCT VFR BLD CALC: 40.1 % — SIGNIFICANT CHANGE UP (ref 34.5–45)
HCT VFR BLDA CALC: 42 % — SIGNIFICANT CHANGE UP (ref 34.5–46.5)
HGB BLD CALC-MCNC: 14 G/DL — SIGNIFICANT CHANGE UP (ref 11.7–16.1)
HGB BLD-MCNC: 13.4 G/DL — SIGNIFICANT CHANGE UP (ref 11.5–15.5)
HYALINE CASTS # UR AUTO: 1 /LPF — SIGNIFICANT CHANGE UP (ref 0–2)
IMM GRANULOCYTES NFR BLD AUTO: 0.2 % — SIGNIFICANT CHANGE UP (ref 0–1.5)
KETONES UR-MCNC: NEGATIVE — SIGNIFICANT CHANGE UP
LACTATE BLDV-MCNC: 1.5 MMOL/L — SIGNIFICANT CHANGE UP (ref 0.7–2)
LEUKOCYTE ESTERASE UR-ACNC: ABNORMAL
LYMPHOCYTES # BLD AUTO: 0.99 K/UL — LOW (ref 1–3.3)
LYMPHOCYTES # BLD AUTO: 17.4 % — SIGNIFICANT CHANGE UP (ref 13–44)
MAGNESIUM SERPL-MCNC: 2 MG/DL — SIGNIFICANT CHANGE UP (ref 1.6–2.6)
MCHC RBC-ENTMCNC: 29.5 PG — SIGNIFICANT CHANGE UP (ref 27–34)
MCHC RBC-ENTMCNC: 33.4 GM/DL — SIGNIFICANT CHANGE UP (ref 32–36)
MCV RBC AUTO: 88.3 FL — SIGNIFICANT CHANGE UP (ref 80–100)
MONOCYTES # BLD AUTO: 0.54 K/UL — SIGNIFICANT CHANGE UP (ref 0–0.9)
MONOCYTES NFR BLD AUTO: 9.5 % — SIGNIFICANT CHANGE UP (ref 2–14)
NEUTROPHILS # BLD AUTO: 4.11 K/UL — SIGNIFICANT CHANGE UP (ref 1.8–7.4)
NEUTROPHILS NFR BLD AUTO: 72 % — SIGNIFICANT CHANGE UP (ref 43–77)
NITRITE UR-MCNC: NEGATIVE — SIGNIFICANT CHANGE UP
NRBC # BLD: 0 /100 WBCS — SIGNIFICANT CHANGE UP (ref 0–0)
NT-PROBNP SERPL-SCNC: 39 PG/ML — SIGNIFICANT CHANGE UP (ref 0–300)
PCO2 BLDV: 20 MMHG — LOW (ref 39–42)
PH BLDV: 7.59 — HIGH (ref 7.32–7.43)
PH UR: 8 — SIGNIFICANT CHANGE UP (ref 5–8)
PLATELET # BLD AUTO: 222 K/UL — SIGNIFICANT CHANGE UP (ref 150–400)
PO2 BLDV: 87 MMHG — HIGH (ref 25–45)
POTASSIUM BLDV-SCNC: 3.7 MMOL/L — SIGNIFICANT CHANGE UP (ref 3.5–5.1)
POTASSIUM SERPL-MCNC: 3.6 MMOL/L — SIGNIFICANT CHANGE UP (ref 3.5–5.3)
POTASSIUM SERPL-SCNC: 3.6 MMOL/L — SIGNIFICANT CHANGE UP (ref 3.5–5.3)
PROT SERPL-MCNC: 7.3 G/DL — SIGNIFICANT CHANGE UP (ref 6–8.3)
PROT UR-MCNC: NEGATIVE — SIGNIFICANT CHANGE UP
RBC # BLD: 4.54 M/UL — SIGNIFICANT CHANGE UP (ref 3.8–5.2)
RBC # FLD: 12.2 % — SIGNIFICANT CHANGE UP (ref 10.3–14.5)
RBC CASTS # UR COMP ASSIST: 95 /HPF — HIGH (ref 0–4)
SAO2 % BLDV: 98.5 % — HIGH (ref 67–88)
SARS-COV-2 RNA SPEC QL NAA+PROBE: SIGNIFICANT CHANGE UP
SODIUM SERPL-SCNC: 141 MMOL/L — SIGNIFICANT CHANGE UP (ref 135–145)
SP GR SPEC: 1.01 — LOW (ref 1.01–1.02)
TROPONIN T, HIGH SENSITIVITY RESULT: <6 NG/L — SIGNIFICANT CHANGE UP (ref 0–51)
UROBILINOGEN FLD QL: NEGATIVE — SIGNIFICANT CHANGE UP
WBC # BLD: 5.7 K/UL — SIGNIFICANT CHANGE UP (ref 3.8–10.5)
WBC # FLD AUTO: 5.7 K/UL — SIGNIFICANT CHANGE UP (ref 3.8–10.5)
WBC UR QL: 3 /HPF — SIGNIFICANT CHANGE UP (ref 0–5)

## 2022-02-27 PROCEDURE — 99285 EMERGENCY DEPT VISIT HI MDM: CPT | Mod: 25

## 2022-02-27 PROCEDURE — 84295 ASSAY OF SERUM SODIUM: CPT

## 2022-02-27 PROCEDURE — 85014 HEMATOCRIT: CPT

## 2022-02-27 PROCEDURE — 85025 COMPLETE CBC W/AUTO DIFF WBC: CPT

## 2022-02-27 PROCEDURE — 93005 ELECTROCARDIOGRAM TRACING: CPT

## 2022-02-27 PROCEDURE — 82803 BLOOD GASES ANY COMBINATION: CPT

## 2022-02-27 PROCEDURE — U0003: CPT

## 2022-02-27 PROCEDURE — 85379 FIBRIN DEGRADATION QUANT: CPT

## 2022-02-27 PROCEDURE — 84484 ASSAY OF TROPONIN QUANT: CPT

## 2022-02-27 PROCEDURE — 94640 AIRWAY INHALATION TREATMENT: CPT

## 2022-02-27 PROCEDURE — 82435 ASSAY OF BLOOD CHLORIDE: CPT

## 2022-02-27 PROCEDURE — 83880 ASSAY OF NATRIURETIC PEPTIDE: CPT

## 2022-02-27 PROCEDURE — 81001 URINALYSIS AUTO W/SCOPE: CPT

## 2022-02-27 PROCEDURE — 93010 ELECTROCARDIOGRAM REPORT: CPT | Mod: 59

## 2022-02-27 PROCEDURE — 84132 ASSAY OF SERUM POTASSIUM: CPT

## 2022-02-27 PROCEDURE — 82947 ASSAY GLUCOSE BLOOD QUANT: CPT

## 2022-02-27 PROCEDURE — 82330 ASSAY OF CALCIUM: CPT

## 2022-02-27 PROCEDURE — 83735 ASSAY OF MAGNESIUM: CPT

## 2022-02-27 PROCEDURE — 71046 X-RAY EXAM CHEST 2 VIEWS: CPT

## 2022-02-27 PROCEDURE — 82565 ASSAY OF CREATININE: CPT

## 2022-02-27 PROCEDURE — U0005: CPT

## 2022-02-27 PROCEDURE — 83605 ASSAY OF LACTIC ACID: CPT

## 2022-02-27 PROCEDURE — 87086 URINE CULTURE/COLONY COUNT: CPT

## 2022-02-27 PROCEDURE — 80053 COMPREHEN METABOLIC PANEL: CPT

## 2022-02-27 PROCEDURE — 71046 X-RAY EXAM CHEST 2 VIEWS: CPT | Mod: 26

## 2022-02-27 PROCEDURE — 99285 EMERGENCY DEPT VISIT HI MDM: CPT

## 2022-02-27 PROCEDURE — 85018 HEMOGLOBIN: CPT

## 2022-02-27 RX ORDER — CEFTRIAXONE 500 MG/1
1000 INJECTION, POWDER, FOR SOLUTION INTRAMUSCULAR; INTRAVENOUS ONCE
Refills: 0 | Status: DISCONTINUED | OUTPATIENT
Start: 2022-02-27 | End: 2022-02-27

## 2022-02-27 RX ORDER — SODIUM CHLORIDE 9 MG/ML
1000 INJECTION INTRAMUSCULAR; INTRAVENOUS; SUBCUTANEOUS ONCE
Refills: 0 | Status: DISCONTINUED | OUTPATIENT
Start: 2022-02-27 | End: 2022-03-03

## 2022-02-27 RX ORDER — IPRATROPIUM/ALBUTEROL SULFATE 18-103MCG
3 AEROSOL WITH ADAPTER (GRAM) INHALATION ONCE
Refills: 0 | Status: COMPLETED | OUTPATIENT
Start: 2022-02-27 | End: 2022-02-27

## 2022-02-27 RX ORDER — IPRATROPIUM/ALBUTEROL SULFATE 18-103MCG
3 AEROSOL WITH ADAPTER (GRAM) INHALATION ONCE
Refills: 0 | Status: DISCONTINUED | OUTPATIENT
Start: 2022-02-27 | End: 2022-03-03

## 2022-02-27 RX ADMIN — Medication 3 MILLILITER(S): at 21:07

## 2022-02-27 NOTE — ED PROVIDER NOTE - NSFOLLOWUPINSTRUCTIONS_ED_ALL_ED_FT
You were evaluated in the ED for your shortness of breath. While you were here you had blood work, an EKG, and chest x-ray done. Your COVID test is still pending.     Continue all regular home medications as prescribed.    Follow up with your Pulmonologist tomorrow. Let them know you were seen in the emergency department today.    You were given copies of the labs and imaging results, please take them to your follow up appointments.    Return to the ER for any worsening symptoms or concerns, including worsening shortness of breath, chest pain, loss of consciousness.    See the information sheet on shortness of breath for more information.

## 2022-02-27 NOTE — ED PROVIDER NOTE - PATIENT PORTAL LINK FT
You can access the FollowMyHealth Patient Portal offered by [facility] by registering at the following website: [email or web address]. By joining [employer]’s FollowMyHealth portal, you will also be able to view your health information using other applications (apps) compatible with our system.

## 2022-02-27 NOTE — ED ADULT NURSE REASSESSMENT NOTE - NS ED NURSE REASSESS COMMENT FT1
Pt reports work of breathing has improved since nebulizer. Vitals remain stable on room air. Pt denies CP. Pt discharged home.

## 2022-02-27 NOTE — ED PROVIDER NOTE - NS ED ROS FT
Gen: Denies fevers  CV: + chest pain  Resp: + SOB, denies cough  GI: Denies nausea, vomiting  Msk: Denies b/l LE pain  : Denies dysuria  all other ROS negative unless indicated in HPI

## 2022-02-27 NOTE — ED PROVIDER NOTE - OBJECTIVE STATEMENT
67YO F hx HLD, COLLINS, covid w/ residual lung scarring, p/w SOB. x 1y, worse x 14d, a/w lightheadedness, no cp, no fevers or cough. pt denies recent immobilization/travel, hx of dvt/pe. pt states she has diffuse chest muscle pain with respirations but no true cp, has no cardiac hx.

## 2022-02-27 NOTE — ED PROVIDER NOTE - CLINICAL SUMMARY MEDICAL DECISION MAKING FREE TEXT BOX
Marlen Luna MD, PGY-2: 67YO F hx HLD, COLLINS, covid w/ residual lung scarring, p/w SOB acute on chronic 14d. VS: 100% on RA, mildly tachypneic, PE lungs clear to auscultation. consider pe vs. pna, low suspicion acs in absence of cardiac hx. plan for basic labs, ddimer, cxr.

## 2022-02-27 NOTE — ED PROVIDER NOTE - ATTENDING CONTRIBUTION TO CARE
Agree with above except noted:     p.w worsening shortness of breath and difficulty catching her breath today w. mild lightheadedness. patient states similar breathing since covid infection months ago and see a pulmonologist and had neg PE CTA in the past. more concerned of the lightheadedness. on exam patient is breathing with accessory muscle but non tachypneic and non tachycardiac, and sat 100% on RA. no sign LE pain or edema. lightheadedness likely due to hyperpnea of unknown etiology. neg lung exam for crackles or wheezes. will get comprehensive labs to evaluate for hematologic abnormality, renal function, electrolyte derangement for possible symptom etiology. low suspicion on infection due to acuity. possible progression of lung dx from covid?. will symptomatic/pain control at this time. will get cxr. low suspicion for PE due to similar breathing issues for 1 yr and neg cta before. if symptoms not improved with treatment will rpt cta. will share decision making with the patient. Agree with above except noted:     p.w worsening shortness of breath and difficulty catching her breath today w. mild lightheadedness. patient states similar breathing since covid infection months ago and see a pulmonologist and had neg PE CTA in the past. more concerned of the lightheadedness. on exam patient is breathing with accessory muscle but non tachypneic and non tachycardiac, and sat 100% on RA. no sign LE pain or edema. lightheadedness likely due to hyperpnea of unknown etiology. neg lung exam for crackles or wheezes. will get comprehensive labs to evaluate for hematologic abnormality, renal function, electrolyte derangement for possible symptom etiology. low suspicion on infection due to acuity. possible progression of lung dx from covid?. will symptomatic/pain control at this time. will get cxr. low suspicion for PE due to similar breathing issues for 1 yr and neg cta before. if symptoms not improved with treatment will rpt cta. low suspicion for central neuro or cardiac involvement. will share decision making with the patient.

## 2022-02-27 NOTE — ED PROVIDER NOTE - NSICDXPASTMEDICALHX_GEN_ALL_CORE_FT
PAST MEDICAL HISTORY:  Hyperlipidemia     Hypertension     Obstructive sleep apnea with CPAP    Vitamin D deficiency

## 2022-02-27 NOTE — ED PROVIDER NOTE - PHYSICAL EXAMINATION
Gen: WDWN, NAD  HEENT: EOMI, no nasal discharge, mucous membranes moist  CV: regular rate, 2+ radial pulses b/l  Resp: CTAB, no W/R/R, + accessory muscle use, + increased work of breathing  GI: Abdomen soft non-distended, NTTP  MSK: No open wounds, no bruising, no LE edema  Neuro: A&Ox4, following commands, moving all four extremities spontaneously  Psych: appropriate mood

## 2022-02-27 NOTE — ED PROVIDER NOTE - PROGRESS NOTE DETAILS
Aleksandra Valdez DO PGY-1  Patient signed out to me. Stable, satting 100%, breathing tachypneic. Aleksandra Valdez DO PGY-1  Patient signed out to me. Stable, satting 100% on room air. Pt currently endorsing fatigue, shortness of breath worse than baseline. Aleksandra Valdez DO PGY-1  Patient clinically appears much improved after nebulizer treatments, breathing at a regular rate, still satting 100% on room air. Lungs CTA B/L, good air flow. Had extensive discussion with patient and daughter about CTA to evaluate lung scarring/for PE. Suspicion of PE is low. Negative dimer, patient breathing comfortably, not tachycardic, chest pain is not pleuritic. Patient is to f/u with her personal pulmonologist tomorrow. Discussed plan for discharge home and advised strict return precautions. Patient expresses understanding and agrees with the plan. All questions and concerns were addressed. Aleksandra Valdez DO PGY-1  Patient signed out to me. Stable, satting 100% on room air although tachypneic. Pt currently endorsing fatigue, shortness of breath worse than baseline.

## 2022-02-28 ENCOUNTER — APPOINTMENT (OUTPATIENT)
Dept: PULMONOLOGY | Facility: CLINIC | Age: 67
End: 2022-02-28
Payer: COMMERCIAL

## 2022-02-28 VITALS
WEIGHT: 167 LBS | BODY MASS INDEX: 30.73 KG/M2 | RESPIRATION RATE: 16 BRPM | SYSTOLIC BLOOD PRESSURE: 140 MMHG | DIASTOLIC BLOOD PRESSURE: 63 MMHG | HEIGHT: 62 IN | TEMPERATURE: 97.7 F | OXYGEN SATURATION: 98 % | HEART RATE: 100 BPM

## 2022-02-28 PROCEDURE — 99214 OFFICE O/P EST MOD 30 MIN: CPT | Mod: 25

## 2022-02-28 PROCEDURE — 94727 GAS DIL/WSHOT DETER LNG VOL: CPT

## 2022-02-28 PROCEDURE — 94010 BREATHING CAPACITY TEST: CPT

## 2022-02-28 PROCEDURE — 95012 NITRIC OXIDE EXP GAS DETER: CPT

## 2022-02-28 PROCEDURE — 94618 PULMONARY STRESS TESTING: CPT

## 2022-02-28 PROCEDURE — 94729 DIFFUSING CAPACITY: CPT

## 2022-02-28 NOTE — HISTORY OF PRESENT ILLNESS
[TextBox_4] : Ms. CASIANO is a 66 year old female with a history of allergies, GERD, overweight, COLLINS, RADS, Covid-19 pneumonitis, post nasal drip presenting to the office today for a follow up pulmonary evaluation. Her chief complaint is \par \par -she notes recently hospitalized due to worsening SOB and nausea\par -she notes constant SOB onset couple of weeks \par -she notes going to urgent care and then transferred to  [facility] due to severe SOB and high /90\par -she notes SOB during rest but more on exertion \par -she notes chest pressure due to masses \par -she notes use of Advair BID and spiriva at noon \par -she notes consistent with tablets but notes some irregular use of inhaler \par -she notes constant globus sensation due to PND\par -she denies palpitation\par -she denies coughing \par -she denies wheezing\par -she notes dealing with family death\par -she notes recent home renovation \par -she notes weight is stable \par -she notes mild itchy ears \par -she denies immediate relief with inhaler use \par -she notes anxiety exacerbates SOB\par -she notes series of burps due to heavy breathing\par -she notes use of famotidine to control reflux\par -she notes given nebulizer that  \par \par -denies any visual issues, headaches, nausea, vomiting, fever, chills, sweats, chest pain, chest pressure, diarrhea, constipation, dysphagia, dizziness, leg swelling, leg pain, itchy eyes, itchy ears, heartburn, reflux, or sour taste in the mouth.

## 2022-02-28 NOTE — ADDENDUM
[FreeTextEntry1] : Documented by Grady Maravilla acting as a scribe for Dr. Balta Verma on 02/28/2022 \par \par All medical record entries made by the Scribe were at my, Dr. Balta Verma's, direction and personally dictated by me on 02/28/2022 . I have reviewed the chart and agree that the record accurately reflects my personal performance of the history, physical exam, assessment and plan. I have also personally directed, reviewed, and agree with the discharge instructions

## 2022-02-28 NOTE — REASON FOR VISIT
[Follow-Up] : a follow-up visit [Pre-op Risk Stratification] : pre-op risk stratification [TextBox_44] : s/p COVID-19 infection, PND, RADS, SOB

## 2022-02-28 NOTE — PROCEDURE
[FreeTextEntry1] : Xray (2.27.2021) revealed clear lungs \par \par CBC revealed (2.27.2021) wbc 6.7 hgb 13.,4 hct 40.1 plt 222 .4% EOS, CMP normal, CO2 17\par \par d dimer < 150 \par \par Full PFT revealed normal flows, with a FEV1 of 2.12L,which is 99% of predicted,  normal lung volumes, and normal diffusion of 16.1 , which is 80% of predicted with a normal flow volume loop \par \par FENO was 37 ; a normal value being less than 25\par Fractional exhaled nitric oxide (FENO) is regarded as a simple, noninvasive method for assessing eosinophilic airway inflammation. Produced by a variety of cells within the lung, nitric oxide (NO) concentrations are generally low in healthy individuals. However, high concentrations of NO appear to be involved in nonspecific host defense mechanisms and chronic inflammatory diseases such as asthma. The American Thoracic Society (ATS) therefore has recommended using FENO to aid in the diagnosis and monitoring of eosinophilic airway inflammation and asthma, and for identifying steroid responsive individuals whose chronic respiratory symptoms may be caused by airway inflammation. \par \par 6 minute walk test reveals a low saturation of 97% with no evidence of dyspnea or fatigue; walked  472.7meters

## 2022-02-28 NOTE — ASSESSMENT
[FreeTextEntry1] : Ms. CASIANO is a 66 year old female with a history of  allergies, GERD, overweight, COLLINS, RADS, Covid-19 pneumonitis 1/2021, post nasal drip who comes into the office today for pulmonary evaluation s/p COVID -19 pneumonitis with residual SOB (better with meds) - now c/w progressive SOB (exertion over rest)- ?anxiety\par \par The patient's shortness of breath is multifactorial due to:\par -pulmonary disease \par      - Asthmatic symptoms \par - s/p COVID -19 pneumonitis \par -poor breathing mechanics \par    - anxiety \par -overweight/out of shape\par -?cardiac disease (doubt) \par \par \par Problem 1: s/p covid -19 pneumonitis (resolved) ?PE\par -2nd vaccination (Pfizer). \par - recommend NAC 900mg BID\par - Obtain f/u CT scan in 6/2021 \par - s/p CTPA -next 6/2022 (improved COVID-19 PNA) \par \par Problem 2: RADS- active \par -add Xopenex 0.63 via nebulizer q6H prn (1st) \par -continue Generic Advair 113 2 Puffs BID (2nd) \par -continue Spiriva at 2 inhalations QAM (3rd) \par -continue Singulair 10 mg QHS \par -continue Albuterol via nebulizer, Q6H \par -Asthma is believed to be caused by inherited (genetic) and environmental factor, but its exact cause is unknown. Asthma may be triggered by allergens, lung infections, or irritants in the air. Asthma triggers are different for each person\par \par Information sheet given to the patient to be reviewed, this medication is never to be used without consulting the prescribing physician. Proper dietary restraint is necessary specifically salt containing foods, if any reaction may occur should be reported. \par \par \par Problem 3 : Non allergic/ gustatory rhinitis  & sinusitis \par -Add Atrovent (0.6) nasal spray 2 sniff BID \par - Add Qnasl 1 sniff each nostril BID or Add Flonase 1 sniff/nostril BID \par -continue Clarinex 5 mg QAM \par - Environmental measures for allergies were encouraged including mattress and pillow cover, air purifier, and environmental controls. \par \par Problem 4 :OSAS - [employer] \par - Continue CPAP machine \par -complete home sleep study \par Sleep apnea is associated with adverse clinical consequences which an affect most organ systems. Cardiovascular disease risk includes arrhythmias, atrial fibrillation, hypertension, coronary artery disease, and stroke. Metabolic disorders include diabetes type 2, non-alcoholic fatty liver disease. Mood disorder especially depression; and cognitive decline especially in the elderly. Associations with chronic reflux/Weston’s esophagus some but not all inclusive. \par -Reasons include arousal consistent with hypopnea; respiratory events most prominent in REM sleep or supine position; therefore sleep staging and body position are important for accurate diagnosis and estimation of AHI. \par \par According to the National Heart, Lung and Blood [place], CPAP or continuous positive airway pressure is a treatment that uses mild air pressure to keep breathing airways open. A CPAP machine includes a mask or other device that fits over the nose or nose and mouth. The mask is connected to a machine via the tube through which humidified air is blown. In the cases of obstructive sleep apnea, CPAP can reverse the complete blockages or narrowing of upper airways. Following diagnosis, CPAP machine pressures can be determined by a CPAP titration. Individuals who require CPAP can choose among masks and equipment that meet prescription and maximize comfort. Many become accustomed right away while others could require more time. Problems include uncomfortable masks or air leakage which can be adjusted to optimize compliance. \par \par \par  problem 5: GERD\par -add Protonix 40 mg QAM, pre-breakfast \par -Continue Pepcid 40 mg QHS \par -Rule of 2s: avoid eating too much, eating too fast, eating too late, eating too spicy, eating too lousy, eating two hours before bed.\par -Things to avoid including overeating, spicy foods, tight clothing, eating within three hours of bed, this list is not all inclusive. \par -For treatment of reflux, possible options discussed including diet control, H2 blockers, PPIs, as well as coating motility agents discussed as treatment options. Timing of meals and proximity of last meal to sleep were discussed. If symptoms persist, a formal gastrointestinal evaluation is needed.\par \par \par Problem 6: Poor Mechanics of Breathing\par -recommended Wim Hof and Buteyko breathing techniques \par - Proper breathing techniques were reviewed with an emphasis of exhalation. Patient instructed to breath in for 1 second and out for four seconds. Patient was encouraged to not talk while walking. \par \par Problem 7: Overweight\par -Recommend "Muniq" OTC Supplement \par -Weight loss, exercise, and diet control were discussed and are highly encouraged. Treatment options were given such as, aqua therapy, and contacting a nutritionist. Recommended to use the elliptical, stationary bike, less use of treadmill. Mindful eating was explained to the patient Obesity is associated with worsening asthma, shortness of breath, and potential for cardiac disease, diabetes, and other underlying medical conditions. \par \par Problem 8: Cardiac\par -recommended to follow up with a cardiologist\par \par Problem 9: health maintenance\par -s/p influenza vaccine 2021 \par -recommended strep pneumonia vaccines after age 65: Prevnar-13 vaccine, followed by Pneumo vaccine 23 one year following\par -recommended early intervention for URIs\par -recommended regular osteoporosis evaluations\par -recommended early dermatological evaluations\par -recommended after the age of 50 to the age of 70, colonoscopy every 5 years \par \par  Follow up in 6-8 weeks\par -he  is recommended to call with any changes, questions, or concerns.

## 2022-03-01 ENCOUNTER — APPOINTMENT (OUTPATIENT)
Dept: INTERNAL MEDICINE | Facility: CLINIC | Age: 67
End: 2022-03-01
Payer: COMMERCIAL

## 2022-03-01 ENCOUNTER — TRANSCRIPTION ENCOUNTER (OUTPATIENT)
Age: 67
End: 2022-03-01

## 2022-03-01 VITALS
HEART RATE: 94 BPM | WEIGHT: 187 LBS | BODY MASS INDEX: 34.41 KG/M2 | DIASTOLIC BLOOD PRESSURE: 82 MMHG | HEIGHT: 62 IN | SYSTOLIC BLOOD PRESSURE: 140 MMHG | OXYGEN SATURATION: 99 % | RESPIRATION RATE: 16 BRPM

## 2022-03-01 VITALS
RESPIRATION RATE: 17 BRPM | DIASTOLIC BLOOD PRESSURE: 70 MMHG | OXYGEN SATURATION: 99 % | HEART RATE: 80 BPM | SYSTOLIC BLOOD PRESSURE: 130 MMHG

## 2022-03-01 DIAGNOSIS — R42 DIZZINESS AND GIDDINESS: ICD-10-CM

## 2022-03-01 LAB
CULTURE RESULTS: SIGNIFICANT CHANGE UP
SPECIMEN SOURCE: SIGNIFICANT CHANGE UP

## 2022-03-01 PROCEDURE — 99214 OFFICE O/P EST MOD 30 MIN: CPT

## 2022-03-01 RX ORDER — FLUTICASONE FUROATE, UMECLIDINIUM BROMIDE AND VILANTEROL TRIFENATATE 100; 62.5; 25 UG/1; UG/1; UG/1
100-62.5-25 POWDER RESPIRATORY (INHALATION)
Qty: 3 | Refills: 1 | Status: DISCONTINUED | COMMUNITY
Start: 2021-05-19 | End: 2022-03-01

## 2022-03-01 NOTE — PHYSICAL EXAM
[No Respiratory Distress] : no respiratory distress  [Clear to Auscultation] : lungs were clear to auscultation bilaterally [Normal Percussion] : the chest was normal to percussion [de-identified] : WDWN in NAD\par HEENT:  unremarkable\par Neck:  supple, no JVD, no LN\par Lungs:  CTA B/L, no W/R/R\par Heart:  Reg rate, +S1S2, no M/R/G\par Abdomen:  soft, NT, ND, +BS, no masses, no HS-megaly\par Genital: No pubic or genital lesions noted.\par Ext:  no C/C/E\par Neuro:  no focal deficits  [de-identified] : pt kept taking labored breath. RR 16-17, SPO2 in RA 98-99%. [de-identified] : anxious

## 2022-03-01 NOTE — HISTORY OF PRESENT ILLNESS
[FreeTextEntry1] : RPA [de-identified] : MAKSIM CASIANO is a 66 year old [facility] female with history of s/p COVID-19 infection in Jan 2021 and got tx with MAB infusion, residual COVID symptoms, COLLINS with CPAP, PND, RADS, SOB, Osteoporosis, GERD, obesity, preDM  presented today for f/u ED [facility] visit on Sunday 2/27/22 for difficulty breathing. Daughter Cassie came together to support mother and pt wanted to share her medical information with daughter. \par Reviewed ED document. Chest xray and ECG: all unremarkable. Seen by with Dr. Verma, pulmonary on 2/28/22. Dr. Verma increased Advair to 2 puff bid from 1 puff bid, started Levalbuterol 1unit nebulizer qid prn and started Pantoprazole 40mg po qd. She did not start the new medications yet and will pick them this afternoon. She needed refill for Atrovent nasal spray. Pt worried on her lightheadedness from this morning and her BP at ED was high as 160/90. No fever, chills, CP, SOB, leg swelling, n/v/c/d, abdominal pain.\par Daughter and pt acknowledged that her anxiety level has increased since pt's father recently passed at [facility] and the whole family is in big grief. They stopped the home construction for allergy concern and daughter will take time to stay with mother in this week.  Pt wanted to see Dr. Manning and get MD note to rest for a few weeks from job.

## 2022-03-01 NOTE — ASSESSMENT
[FreeTextEntry1] : # labored breath\par All v/s, labs, ECG, XRAY at ED 2 days ago was normal limit.\par Reviewed Labs at ED with pt and daughter: CBC, CMP, proBNP, Troponin T: all normal range.  D-dimer <150.\par Clear lung sound on auscultation. Heart sound normal.\par -PFT done yesterday at pulmonary: A minimal obstructive lung defect. Decrease in flow rate at 75% of the flow volume curve. A mild restrictive lung defect. A mild decrease in diffusing capacity.\par Continue all medication by Dr. Verma including Levalbuterol nebulizer, Advair, Spiriva, Singulair, and Montelukast. \par Avoid allergen as much as possible.\par \par # UTI\par UA and urine culture at ED reviewed: Blood in urine large, Group B streptococcus Agalactiae 55-99K.\par As pt denies UTI sx such as fever, chills, frequency, dysuria but had hx of renal stone. No CVA tenderness.\par No need tx with ABT at this time. Pt will see Dr. Oleary on 3/17/22 for f/u.\par Encouraged rest, frequent ambulation, taking enough oral fluid during day time and regular urination. \par \par # HTN/  lightheadedness\par BP decreased to 130/70 after a couple of check in the office. \par No focal deficit.\par Move slowly and take enough oral fluid and monitor home BP.\par Continue take Telmisartan 40mg po qd and limit salt intake. \par \par #Anxiety\par D/W Dr. Manning for pt's hyperventilation. \par Pt acknowledged extreme stress from work, house renovation, bereavement and felt anxious.\par Denies SI/HI.\par Recommended to try short term immediate release Alprazolam 0.25mg  1tab po bid as needed for 5 days. \par Dr. Manning will send RX for Alprazolam. Judicious use of benzo for acute anxiety was discussed. She can try at night and avoid driving a car, machinery right after Benzo.\par Pt should email back to Dr. Manning for f/u on this Friday and RTC in person on 3/11/22.\par \par -Provided MD note to excuse her from work for recuperation and treatment. She wanted to return to work on 3/15/22 but needs reevaluation by Dr. Manning before clearance. \par -RTC on 3/11/22.\par \par \par

## 2022-03-01 NOTE — REVIEW OF SYSTEMS
[Shortness Of Breath] : shortness of breath [FreeTextEntry2] : Constitutional:  no fever and no chills. \par Eyes:  no discharge. \par HEENT:  no earache. \par Cardiovascular:  no chest pain, no palpitations and no lower extremity edema. \par Respiratory:  no shortness of breath, no wheezing and no cough. \par Gastrointestinal:  no abdominal pain, no nausea and no vomiting. \par Genitourinary:  no dysuria. \par Musculoskeletal:  no joint pain. \par Integumentary:  no itching. \par Neurological:  no headache. \par Psychiatric:  not suicidal. \par Hematologic/Lymphatic:  no easy bleeding. [FreeTextEntry6] : tachypnea

## 2022-03-02 RX ORDER — CEPHALEXIN 500 MG
1 CAPSULE ORAL
Qty: 15 | Refills: 0
Start: 2022-03-02 | End: 2022-03-06

## 2022-03-02 NOTE — ED POST DISCHARGE NOTE - OTHER COMMUNICATION
3/2/22: Spoke w/ pt, informed her of ucx. She advised no real urinary sxs at this time, has f/u with PMD next week, will repeat test then. Requested rx in case develops sxs in between now and appt next week as she did have some last week, will send keflex for pt to use only if develops sxs between now and PMD. Pt acknowledged understanding, will f/u with PMD for repeat testing. - Oziel Baires PA-C

## 2022-03-02 NOTE — ED POST DISCHARGE NOTE - DETAILS
3/2/22: SPoke w/ daughter, pt currently on work call, left number w/ daughter for pt to call back admin line. - Oziel Baires PA-C 3/2/22: Spoke w/ daughter, pt currently on work call, left number w/ daughter for pt to call back admin line. - Oziel Baires PA-C

## 2022-03-09 ENCOUNTER — APPOINTMENT (OUTPATIENT)
Dept: PULMONOLOGY | Facility: CLINIC | Age: 67
End: 2022-03-09

## 2022-03-11 ENCOUNTER — APPOINTMENT (OUTPATIENT)
Dept: INTERNAL MEDICINE | Facility: CLINIC | Age: 67
End: 2022-03-11
Payer: COMMERCIAL

## 2022-03-11 PROCEDURE — 99213 OFFICE O/P EST LOW 20 MIN: CPT

## 2022-03-13 VITALS — DIASTOLIC BLOOD PRESSURE: 70 MMHG | RESPIRATION RATE: 14 BRPM | SYSTOLIC BLOOD PRESSURE: 110 MMHG | HEART RATE: 74 BPM

## 2022-03-13 NOTE — HISTORY OF PRESENT ILLNESS
[de-identified] : Comes in to talk about post COVID symptoms. \par Continues with persistent nonproductive cough and  EASON.  Marked fatigue.

## 2022-04-14 ENCOUNTER — RESULT REVIEW (OUTPATIENT)
Age: 67
End: 2022-04-14

## 2022-04-14 ENCOUNTER — APPOINTMENT (OUTPATIENT)
Dept: MAMMOGRAPHY | Facility: CLINIC | Age: 67
End: 2022-04-14
Payer: COMMERCIAL

## 2022-04-14 ENCOUNTER — OUTPATIENT (OUTPATIENT)
Dept: OUTPATIENT SERVICES | Facility: HOSPITAL | Age: 67
LOS: 1 days | End: 2022-04-14
Payer: COMMERCIAL

## 2022-04-14 ENCOUNTER — APPOINTMENT (OUTPATIENT)
Dept: RADIOLOGY | Facility: CLINIC | Age: 67
End: 2022-04-14
Payer: COMMERCIAL

## 2022-04-14 ENCOUNTER — APPOINTMENT (OUTPATIENT)
Dept: RADIOLOGY | Facility: CLINIC | Age: 67
End: 2022-04-14

## 2022-04-14 ENCOUNTER — APPOINTMENT (OUTPATIENT)
Dept: ULTRASOUND IMAGING | Facility: CLINIC | Age: 67
End: 2022-04-14
Payer: COMMERCIAL

## 2022-04-14 DIAGNOSIS — Z00.8 ENCOUNTER FOR OTHER GENERAL EXAMINATION: ICD-10-CM

## 2022-04-14 DIAGNOSIS — Z00.00 ENCOUNTER FOR GENERAL ADULT MEDICAL EXAMINATION WITHOUT ABNORMAL FINDINGS: ICD-10-CM

## 2022-04-14 DIAGNOSIS — Z98.891 HISTORY OF UTERINE SCAR FROM PREVIOUS SURGERY: Chronic | ICD-10-CM

## 2022-04-14 PROCEDURE — 77067 SCR MAMMO BI INCL CAD: CPT | Mod: 26

## 2022-04-14 PROCEDURE — 77063 BREAST TOMOSYNTHESIS BI: CPT | Mod: 26

## 2022-04-14 PROCEDURE — 76641 ULTRASOUND BREAST COMPLETE: CPT | Mod: 26,50

## 2022-04-14 PROCEDURE — 73562 X-RAY EXAM OF KNEE 3: CPT | Mod: 26,RT

## 2022-04-14 PROCEDURE — 73562 X-RAY EXAM OF KNEE 3: CPT

## 2022-04-14 PROCEDURE — 77063 BREAST TOMOSYNTHESIS BI: CPT

## 2022-04-14 PROCEDURE — 76641 ULTRASOUND BREAST COMPLETE: CPT

## 2022-04-14 PROCEDURE — 77067 SCR MAMMO BI INCL CAD: CPT

## 2022-04-22 ENCOUNTER — OUTPATIENT (OUTPATIENT)
Dept: OUTPATIENT SERVICES | Facility: HOSPITAL | Age: 67
LOS: 1 days | End: 2022-04-22
Payer: COMMERCIAL

## 2022-04-22 ENCOUNTER — APPOINTMENT (OUTPATIENT)
Dept: MRI IMAGING | Facility: CLINIC | Age: 67
End: 2022-04-22
Payer: COMMERCIAL

## 2022-04-22 DIAGNOSIS — Z98.891 HISTORY OF UTERINE SCAR FROM PREVIOUS SURGERY: Chronic | ICD-10-CM

## 2022-04-22 DIAGNOSIS — Z00.8 ENCOUNTER FOR OTHER GENERAL EXAMINATION: ICD-10-CM

## 2022-04-22 PROCEDURE — 72148 MRI LUMBAR SPINE W/O DYE: CPT

## 2022-04-22 PROCEDURE — 72148 MRI LUMBAR SPINE W/O DYE: CPT | Mod: 26

## 2022-04-29 ENCOUNTER — NON-APPOINTMENT (OUTPATIENT)
Age: 67
End: 2022-04-29

## 2022-04-29 ENCOUNTER — APPOINTMENT (OUTPATIENT)
Dept: PULMONOLOGY | Facility: CLINIC | Age: 67
End: 2022-04-29
Payer: COMMERCIAL

## 2022-04-29 VITALS
WEIGHT: 174 LBS | DIASTOLIC BLOOD PRESSURE: 80 MMHG | HEIGHT: 62 IN | TEMPERATURE: 97.7 F | BODY MASS INDEX: 32.02 KG/M2 | OXYGEN SATURATION: 98 % | HEART RATE: 80 BPM | RESPIRATION RATE: 16 BRPM | SYSTOLIC BLOOD PRESSURE: 120 MMHG

## 2022-04-29 DIAGNOSIS — U07.1 COVID-19: ICD-10-CM

## 2022-04-29 DIAGNOSIS — J12.82 COVID-19: ICD-10-CM

## 2022-04-29 PROCEDURE — 95012 NITRIC OXIDE EXP GAS DETER: CPT

## 2022-04-29 PROCEDURE — 94010 BREATHING CAPACITY TEST: CPT

## 2022-04-29 PROCEDURE — 99214 OFFICE O/P EST MOD 30 MIN: CPT | Mod: 25

## 2022-04-29 NOTE — HISTORY OF PRESENT ILLNESS
[TextBox_4] : Ms. CASIANO is a 66 year old female with a history of allergies, GERD, overweight, COLLINS, RADS, Covid-19 pneumonitis, post nasal drip presenting to the office today for a follow up pulmonary evaluation. Her chief complaint is \par -she notes feeling much better\par -she notes not needing her nebulizer\par -she notes using her inhalers and Spiriva\par -she notes using Advair but has dropped to one puff\par -she notes that yesterday she notes chest tightness and pressure \par -she notes itchy ears\par -she notes her weight has increased recently\par -she notes that she has sciatica\par -she denies any visual issues \par -she notes that she cannot exercise due to difficulty breathing\par -she notes itchy eyes at times\par -she notes that her leg pains are caused by sciatica\par -she notes her bowels are regular \par -she notes her biggest complaints are her breathing and sciatica\par -she notes that she has been doing physical therapy for her sciatica\par -she notes that she does not want to have a cortisone injection for her sciatica \par -she notes seeing Dr. Shawn Piper\par -she notes have an MRI done recently\par -s/p covid 19 vaccine x3\par \par -patient denies any headaches, nausea, vomiting, fever, chills, sweats, palpitations, coughing, wheezing, fatigue, diarrhea, constipation, dysphagia, myalgias, dizziness, leg swelling, leg pain, heartburn, reflux or sour taste in the mouth

## 2022-04-29 NOTE — ADDENDUM
[FreeTextEntry1] : Documented by Steve Mckeon acting as a scribe for Dr. Balta Verma on 04/29/2022.\par \par All medical record entries made by the Scribe were at my, Dr. Balta Verma's, direction and personally dictated by me on 04/29/2022. I have reviewed the chart and agree that the record accurately reflects my personal performance of the history, physical exam, assessment and plan. I have also personally directed, reviewed, and agree with the discharge instructions.

## 2022-04-29 NOTE — PROCEDURE
[FreeTextEntry1] : PFT revealed normal flows, with a FEV1 of 1.81L, which is 97% of predicted, with a normal flow volume loop \par \par Feno was 27; a normal value being less than 25. Fractional exhaled nitric oxide (FENO) is regarded as a simple, noninvasive method for assessing eosinophilic airway inflammation. Produced by a variety of cells within the lung, nitric oxide (NO) concentrations are generally low in healthy individuals. However, high concentrations of NO appear to be involved in nonspecific host defense mechanisms and chronic inflammatory  diseases such as asthma. The American Thoracic Society (ATS) therefore recommended using FENO to aid in the diagnosis and monitoring of eosinophilic airway inflammation and asthma, and for identifying steroid responsive individuals whose chronic respiratory symptoms may be caused by airway inflammation \par \par Sleep study (4/19/2022) revealed severe sleep apnea with an AHI/KRZYSZTOF of 57.1 and a low oxygen saturation of 78%

## 2022-04-29 NOTE — ASSESSMENT
[FreeTextEntry1] : Ms. CASIANO is a 66 year old female with a history of  allergies, GERD, overweight, COLLINS, RADS, Covid-19 pneumonitis 1/2021, post nasal drip who comes into the office today for pulmonary evaluation s/p COVID -19 pneumonitis with residual SOB (better with meds) - now c/w progressive SOB (exertion over rest)- spinal pain\par \par The patient's shortness of breath is multifactorial due to:\par -pulmonary disease \par      - Asthmatic symptoms \par - s/p COVID -19 pneumonitis \par -poor breathing mechanics \par    - anxiety \par -overweight/out of shape\par -?cardiac disease (doubt) \par \par \par Problem 1: s/p covid -19 pneumonitis (resolved) ?PE\par -s/p covid 19 vaccine x3 \par - recommend NAC 900mg BID\par - s/p f/u CT scan in 6/2021 \par - s/p CTPA -next 6/2022 (improved COVID-19 PNA) \par \par Problem 2: RADS- (semi active)\par -add Xopenex 0.63 via nebulizer q6H prn (1st) \par -continue Generic Advair 113 2 Puffs BID (2nd) \par -continue Spiriva at 2 inhalations QAM (3rd) \par -continue Singulair 10 mg QHS \par -continue Albuterol via nebulizer, Q6H \par -Asthma is believed to be caused by inherited (genetic) and environmental factor, but its exact cause is unknown. Asthma may be triggered by allergens, lung infections, or irritants in the air. Asthma triggers are different for each person\par \par Information sheet given to the patient to be reviewed, this medication is never to be used without consulting the prescribing physician. Proper dietary restraint is necessary specifically salt containing foods, if any reaction may occur should be reported. \par \par \par Problem 3 : Non allergic/ gustatory rhinitis  & sinusitis \par -Add Atrovent (0.6) nasal spray 2 sniff BID \par - Add Qnasl 1 sniff each nostril BID or Add Flonase 1 sniff/nostril BID \par -continue Clarinex 5 mg QAM \par - Environmental measures for allergies were encouraged including mattress and pillow cover, air purifier, and environmental controls. \par \par Problem 4 :OSAS - [employer] \par -repeat home sleep study (CPAP study) and re-evaluate CPAP usage\par - Continue CPAP machine \par -complete home sleep study \par Sleep apnea is associated with adverse clinical consequences which an affect most organ systems. Cardiovascular disease risk includes arrhythmias, atrial fibrillation, hypertension, coronary artery disease, and stroke. Metabolic disorders include diabetes type 2, non-alcoholic fatty liver disease. Mood disorder especially depression; and cognitive decline especially in the elderly. Associations with chronic reflux/Weston’s esophagus some but not all inclusive. \par -Reasons include arousal consistent with hypopnea; respiratory events most prominent in REM sleep or supine position; therefore sleep staging and body position are important for accurate diagnosis and estimation of AHI. \par \par According to the National Heart, Lung and Blood [place], CPAP or continuous positive airway pressure is a treatment that uses mild air pressure to keep breathing airways open. A CPAP machine includes a mask or other device that fits over the nose or nose and mouth. The mask is connected to a machine via the tube through which humidified air is blown. In the cases of obstructive sleep apnea, CPAP can reverse the complete blockages or narrowing of upper airways. Following diagnosis, CPAP machine pressures can be determined by a CPAP titration. Individuals who require CPAP can choose among masks and equipment that meet prescription and maximize comfort. Many become accustomed right away while others could require more time. Problems include uncomfortable masks or air leakage which can be adjusted to optimize compliance. \par \par \par  problem 5: GERD\par -add Protonix 40 mg QAM, pre-breakfast \par -Continue Pepcid 40 mg QHS \par -Rule of 2s: avoid eating too much, eating too fast, eating too late, eating too spicy, eating too lousy, eating two hours before bed.\par -Things to avoid including overeating, spicy foods, tight clothing, eating within three hours of bed, this list is not all inclusive. \par -For treatment of reflux, possible options discussed including diet control, H2 blockers, PPIs, as well as coating motility agents discussed as treatment options. Timing of meals and proximity of last meal to sleep were discussed. If symptoms persist, a formal gastrointestinal evaluation is needed.\par \par \par Problem 6: Poor Mechanics of Breathing\par -recommended Wim Hof and Buteyko breathing techniques \par - Proper breathing techniques were reviewed with an emphasis of exhalation. Patient instructed to breath in for 1 second and out for four seconds. Patient was encouraged to not talk while walking. \par \par Problem 7: Overweight\par -Recommend "Muniq" OTC Supplement \par -Weight loss, exercise, and diet control were discussed and are highly encouraged. Treatment options were given such as, aqua therapy, and contacting a nutritionist. Recommended to use the elliptical, stationary bike, less use of treadmill. Mindful eating was explained to the patient Obesity is associated with worsening asthma, shortness of breath, and potential for cardiac disease, diabetes, and other underlying medical conditions. \par \par Problem 8: Cardiac\par -recommended to follow up with a cardiologist\par \par Problem 9: health maintenance \par -orthopedist Dr. Anderson recommended\par -s/p covid 19 vaccine x3\par -Recommended not to get a COVID-19 booster at this time until it is updated against the current variants \par -s/p influenza vaccine 2021 \par -recommended strep pneumonia vaccines after age 65: Prevnar-13 vaccine, followed by Pneumo vaccine 23 one year following\par -recommended early intervention for URIs\par -recommended regular osteoporosis evaluations\par -recommended early dermatological evaluations\par -recommended after the age of 50 to the age of 70, colonoscopy every 5 years \par \par  Follow up in 6-8 weeks\par -he  is recommended to call with any changes, questions, or concerns.

## 2022-05-05 ENCOUNTER — APPOINTMENT (OUTPATIENT)
Dept: UROLOGY | Facility: CLINIC | Age: 67
End: 2022-05-05
Payer: COMMERCIAL

## 2022-05-05 ENCOUNTER — OUTPATIENT (OUTPATIENT)
Dept: OUTPATIENT SERVICES | Facility: HOSPITAL | Age: 67
LOS: 1 days | End: 2022-05-05
Payer: COMMERCIAL

## 2022-05-05 DIAGNOSIS — Z98.891 HISTORY OF UTERINE SCAR FROM PREVIOUS SURGERY: Chronic | ICD-10-CM

## 2022-05-05 DIAGNOSIS — R35.0 FREQUENCY OF MICTURITION: ICD-10-CM

## 2022-05-05 PROBLEM — R94.39 ABNORMAL STRESS ELECTROCARDIOGRAM TEST USING TREADMILL: Status: ACTIVE | Noted: 2021-06-30

## 2022-05-05 PROCEDURE — 76775 US EXAM ABDO BACK WALL LIM: CPT | Mod: 26

## 2022-05-05 PROCEDURE — 99213 OFFICE O/P EST LOW 20 MIN: CPT | Mod: 25

## 2022-05-05 PROCEDURE — 76775 US EXAM ABDO BACK WALL LIM: CPT

## 2022-05-05 PROCEDURE — 88112 CYTOPATH CELL ENHANCE TECH: CPT | Mod: 26

## 2022-05-06 DIAGNOSIS — Z00.00 ENCOUNTER FOR GENERAL ADULT MEDICAL EXAMINATION WITHOUT ABNORMAL FINDINGS: ICD-10-CM

## 2022-05-06 DIAGNOSIS — L73.9 FOLLICULAR DISORDER, UNSPECIFIED: ICD-10-CM

## 2022-05-06 DIAGNOSIS — R60.0 LOCALIZED EDEMA: ICD-10-CM

## 2022-05-06 DIAGNOSIS — R06.02 SHORTNESS OF BREATH: ICD-10-CM

## 2022-05-11 LAB
APPEARANCE: CLEAR
BACTERIA UR CULT: NORMAL
BACTERIA: NEGATIVE
BILIRUBIN URINE: NEGATIVE
BLOOD URINE: ABNORMAL
CALCIUM OXALATE CRYSTALS: ABNORMAL
COLOR: NORMAL
GLUCOSE QUALITATIVE U: NEGATIVE
HYALINE CASTS: 0 /LPF
KETONES URINE: NEGATIVE
LEUKOCYTE ESTERASE URINE: NEGATIVE
MICROSCOPIC-UA: NORMAL
NITRITE URINE: NEGATIVE
PH URINE: 6
PROTEIN URINE: NEGATIVE
RED BLOOD CELLS URINE: 2 /HPF
SPECIFIC GRAVITY URINE: 1.02
SQUAMOUS EPITHELIAL CELLS: 1 /HPF
URINE CYTOLOGY: NORMAL
UROBILINOGEN URINE: NORMAL
WHITE BLOOD CELLS URINE: 1 /HPF

## 2022-05-11 PROCEDURE — 88112 CYTOPATH CELL ENHANCE TECH: CPT | Mod: 26

## 2022-05-13 LAB
APPEARANCE: CLEAR
BACTERIA: NEGATIVE
BILIRUBIN URINE: NEGATIVE
BLOOD URINE: NORMAL
COLOR: YELLOW
GLUCOSE QUALITATIVE U: NEGATIVE
HYALINE CASTS: 1 /LPF
KETONES URINE: NEGATIVE
LEUKOCYTE ESTERASE URINE: NEGATIVE
MICROSCOPIC-UA: NORMAL
NITRITE URINE: NEGATIVE
PH URINE: 6.5
PROTEIN URINE: NORMAL
RED BLOOD CELLS URINE: 4 /HPF
SPECIFIC GRAVITY URINE: 1.03
SQUAMOUS EPITHELIAL CELLS: 1 /HPF
URINE CYTOLOGY: NORMAL
UROBILINOGEN URINE: NORMAL
WHITE BLOOD CELLS URINE: 1 /HPF

## 2022-05-15 NOTE — ADDENDUM
[FreeTextEntry1] : Entered by Venice Medina, acting as scribe for Dr. Manuel Oleary.\par \par The documentation recorded by the scribe accurately reflects the service I personally performed and the decisions made by me.

## 2022-05-15 NOTE — LETTER BODY
[FreeTextEntry1] : Curtis Manning MD\par [address]\[place] [address]\par [phone]\par \par Dear Dr. Manning,\par \par Reason for visit: Previous right ureteral stone. Left renal stone.\par \par This is a 66 year-old woman with previous gross hematuria, previous right ureteral stone, and left renal stone. Her CT scan in Sept 2020 demonstrated a 3 mm nonobstructing left renal stone without evidence of a right ureteral stone or hydronephrosis. Her renal ultrasound in March 2021 demonstrated a stable small left renal stone. The patient returns today for follow-up and renal ultrasound. Since he was last seen, the patient notes she is recovering from COVID. She still has pulmonary issues. The patient denies any urinary complaints or difficulties. The patient denies flank pain or hematuria. She is entirely asymptomatic. The patient denies any changes in health. All other review of systems are negative. Past medical history, family history and social history were unchanged. Medications and allergies were reviewed. She has no known allergies to medication. \par \par On examination, the patient is a healthy-appearing woman in no acute distress. She is alert and oriented follows commands. She has normal mood and affect. She is normocephalic. Neck is supple. Oral no thrush. Respirations are unlabored. Abdomen is soft and nontender. Bladder is nonpalpable. No CVA tenderness. Neurologically she is grossly intact. No peripheral edema. Skin without gross abnormality.\par \par I personally reviewed ultrasound images with the patient today and images demonstrated previously visualized left lower pole stone not appreciated on this exam. There is an anterior upper parenchymal simple cyst 0.60 cm. Both kidneys are normal in size and echogenicity without stones, hydronephrosis or solid masses visualized.\par \par Assessment: Previous gross hematuria. Left renal stone, resolved. \par \par I counseled the patient. In terms of her left renal stone, her renal ultrasound today demonstrated no evidence of any stone or hydronephrosis. I reassured the patient. I encouraged the patient to maintain a low-protein low-sodium diet. I also encouraged hydration to prevent stone formation. I recommended the patient to follow-up in 1 year for repeat renal ultrasound to ensure stability. In terms of her previous hematuria, I recommended she repeat urinalysis and urine cytology. Risks and alternatives were discussed. I answered the patient questions. The patient will follow-up as directed and will contact me with any questions or concerns. Thank you for the opportunity to participate in the care of Ms. CASIANO. I will keep you updated on her progress.\par \par Plan: Urinalysis. Urine cytology. Stone diet. Hydration. Follow-up in 1 year for renal ultrasound.

## 2022-05-17 ENCOUNTER — NON-APPOINTMENT (OUTPATIENT)
Age: 67
End: 2022-05-17

## 2022-05-25 ENCOUNTER — APPOINTMENT (OUTPATIENT)
Dept: DERMATOLOGY | Facility: CLINIC | Age: 67
End: 2022-05-25
Payer: COMMERCIAL

## 2022-05-25 ENCOUNTER — LABORATORY RESULT (OUTPATIENT)
Age: 67
End: 2022-05-25

## 2022-05-25 DIAGNOSIS — L91.8 OTHER HYPERTROPHIC DISORDERS OF THE SKIN: ICD-10-CM

## 2022-05-25 PROCEDURE — 11103 TANGNTL BX SKIN EA SEP/ADDL: CPT

## 2022-05-25 PROCEDURE — 99212 OFFICE O/P EST SF 10 MIN: CPT | Mod: 25

## 2022-05-25 PROCEDURE — 11102 TANGNTL BX SKIN SINGLE LES: CPT

## 2022-06-10 ENCOUNTER — NON-APPOINTMENT (OUTPATIENT)
Age: 67
End: 2022-06-10

## 2022-06-20 ENCOUNTER — APPOINTMENT (OUTPATIENT)
Dept: CT IMAGING | Facility: CLINIC | Age: 67
End: 2022-06-20
Payer: COMMERCIAL

## 2022-06-20 ENCOUNTER — OUTPATIENT (OUTPATIENT)
Dept: OUTPATIENT SERVICES | Facility: HOSPITAL | Age: 67
LOS: 1 days | End: 2022-06-20
Payer: COMMERCIAL

## 2022-06-20 DIAGNOSIS — Z98.891 HISTORY OF UTERINE SCAR FROM PREVIOUS SURGERY: Chronic | ICD-10-CM

## 2022-06-20 DIAGNOSIS — Z00.8 ENCOUNTER FOR OTHER GENERAL EXAMINATION: ICD-10-CM

## 2022-06-20 DIAGNOSIS — U07.1 COVID-19: ICD-10-CM

## 2022-06-20 PROCEDURE — 71250 CT THORAX DX C-: CPT

## 2022-06-20 PROCEDURE — 71250 CT THORAX DX C-: CPT | Mod: 26

## 2022-06-29 ENCOUNTER — NON-APPOINTMENT (OUTPATIENT)
Age: 67
End: 2022-06-29

## 2022-07-06 LAB
APPEARANCE: CLEAR
BACTERIA: NEGATIVE
BILIRUBIN URINE: NEGATIVE
BLOOD URINE: ABNORMAL
COLOR: COLORLESS
GLUCOSE QUALITATIVE U: NEGATIVE
HYALINE CASTS: 1 /LPF
KETONES URINE: NEGATIVE
LEUKOCYTE ESTERASE URINE: NEGATIVE
MICROSCOPIC-UA: NORMAL
NITRITE URINE: NEGATIVE
PH URINE: 7
PROTEIN URINE: NEGATIVE
RED BLOOD CELLS URINE: 41 /HPF
SPECIFIC GRAVITY URINE: 1.01
SQUAMOUS EPITHELIAL CELLS: 0 /HPF
UROBILINOGEN URINE: NORMAL
WHITE BLOOD CELLS URINE: 1 /HPF

## 2022-07-07 LAB — BACTERIA UR CULT: NORMAL

## 2022-07-12 ENCOUNTER — APPOINTMENT (OUTPATIENT)
Dept: DERMATOLOGY | Facility: CLINIC | Age: 67
End: 2022-07-12

## 2022-07-16 ENCOUNTER — RX RENEWAL (OUTPATIENT)
Age: 67
End: 2022-07-16

## 2022-07-21 ENCOUNTER — APPOINTMENT (OUTPATIENT)
Dept: ENDOCRINOLOGY | Facility: CLINIC | Age: 67
End: 2022-07-21

## 2022-08-13 ENCOUNTER — RX RENEWAL (OUTPATIENT)
Age: 67
End: 2022-08-13

## 2022-08-24 ENCOUNTER — NON-APPOINTMENT (OUTPATIENT)
Age: 67
End: 2022-08-24

## 2022-08-24 ENCOUNTER — APPOINTMENT (OUTPATIENT)
Dept: PULMONOLOGY | Facility: CLINIC | Age: 67
End: 2022-08-24

## 2022-08-24 VITALS
HEART RATE: 74 BPM | OXYGEN SATURATION: 98 % | WEIGHT: 174 LBS | DIASTOLIC BLOOD PRESSURE: 60 MMHG | BODY MASS INDEX: 29.71 KG/M2 | HEIGHT: 64 IN | TEMPERATURE: 98 F | SYSTOLIC BLOOD PRESSURE: 125 MMHG | RESPIRATION RATE: 16 BRPM

## 2022-08-24 PROCEDURE — 95012 NITRIC OXIDE EXP GAS DETER: CPT

## 2022-08-24 PROCEDURE — 99214 OFFICE O/P EST MOD 30 MIN: CPT | Mod: 25

## 2022-08-24 PROCEDURE — 94010 BREATHING CAPACITY TEST: CPT

## 2022-08-24 NOTE — HISTORY OF PRESENT ILLNESS
[TextBox_4] : Ms. CASIANO is a 66 year old female with a history of allergies, GERD, overweight, COLLINS, RADS, Covid-19 pneumonitis, post nasal drip presenting to the office today for a follow up pulmonary evaluation. Her chief complaint is \par - she notes she will be going on a long vacation soon\par - she notes she gets fatigued easily and then SOB \par - she notes she tried walking on a golf [place] and felt SOB \par - she notes she gets some wheezing / coughing every now and then and she attributes it to her post nasal drip \par - no hoarseness\par - she notes she has been using her CPAP machine and tolerating it well \par -She denies any visual issues, headaches, nausea, vomiting, fever, chills, sweats, chest pains, chest pressure, diarrhea, constipation, dysphagia, myalgia, dizziness, leg swelling, leg pain, itchy eyes, itchy ears, heartburn, reflux, or sour taste in the mouth.\par

## 2022-08-24 NOTE — ADDENDUM
[FreeTextEntry1] : Documented by Sandi Mcrae acting as a scribe for Dr. Balta Verma on 08/24/2022 \par \par All medical record entries made by the Scribe were at my, Dr. Balta Verma's, direction and personally dictated by me on 08/24/2022 . I have reviewed the chart and agree that the record accurately reflects my personal performance of the history, physical exam, assessment and plan. I have also personally directed, reviewed, and agree with the discharge instructions.

## 2022-08-24 NOTE — PROCEDURE
[FreeTextEntry1] : PFT reveals normal flows, with an FEV1 of 1.91L, which is  94% of predicted, with normal flow volume loop \par \par FENO was 28; normal value being less than 25\par Fractional exhaled nitric oxide (FENO) is regarded as a simple, noninvasive method for assessing eosinophilic airway inflammation. Produced by a variety of cells within the lung, nitric oxide (NO) concentrations are generally low in healthy individuals. However, high concentrations of NO appear to be involved in nonspecific host defense mechanisms and chronic inflammatory diseases such as asthma. The American Thoracic Society (ATS) therefore has recommended using FENO to aid in the diagnosis and monitoring of eosinophilic airway inflammation and asthma, and for identifying steroid responsive individuals whose chronic respiratory symptoms may be airway inflammation. \par \par CT (jun.24.2022) impression: IMPRESSION:\par Decreased, now minimal peripheral ground-glass opacities, sequela of prior Covid-19 infection.\par

## 2022-08-24 NOTE — ASSESSMENT
[FreeTextEntry1] : Ms. CASIANO is a 66 year old female with a history of  allergies, GERD, overweight, COLLINS, RADS, Covid-19 pneumonitis 1/2021, post nasal drip who comes into the office today for pulmonary evaluation s/p COVID -19 pneumonitis with residual SOB (better with meds) - now c/w progressive SOB (exertion over rest) / fatigue on exertion \par \par The patient's shortness of breath is multifactorial due to:\par -pulmonary disease \par      - Asthmatic symptoms \par - s/p COVID -19 pneumonitis \par -poor breathing mechanics \par    - anxiety \par -overweight/out of shape\par -?cardiac disease (doubt) \par \par \par Problem 1: s/p covid -19 pneumonitis (resolved) ?PE\par -s/p covid 19 vaccine x3 \par - recommend NAC 900mg BID\par - s/p f/u CT scan - next 6/2023 \par - s/p CTPA -next 6/2022 (improved COVID-19 PNA) \par \par Problem 2: RADS- (semi active)\par -add Xopenex 0.63 via nebulizer q6H prn (1st) \par -OFF Generic Advair 113 2 Puffs BID (2nd) \par -OFF Spiriva at 2 inhalations QAM (3rd) \par - Add Breztri 2 puffs BID \par -continue Singulair 10 mg QHS \par -continue Albuterol via nebulizer, Q6H \par -Asthma is believed to be caused by inherited (genetic) and environmental factor, but its exact cause is unknown. Asthma may be triggered by allergens, lung infections, or irritants in the air. Asthma triggers are different for each person\par \par Information sheet given to the patient to be reviewed, this medication is never to be used without consulting the prescribing physician. Proper dietary restraint is necessary specifically salt containing foods, if any reaction may occur should be reported. \par \par \par Problem 3 : Non allergic/ gustatory rhinitis  & sinusitis (quiet)\par -Add Atrovent (0.6) nasal spray 2 sniff BID \par - Add Qnasl 1 sniff each nostril BID or Add Flonase 1 sniff/nostril BID \par -continue Clarinex 5 mg QAM \par - Environmental measures for allergies were encouraged including mattress and pillow cover, air purifier, and environmental controls. \par \par Problem 4 :OSAS - [employer] \par -repeat home sleep study (CPAP study) and re-evaluate CPAP usage- mask of choice \par - Continue CPAP machine \par Sleep apnea is associated with adverse clinical consequences which an affect most organ systems. Cardiovascular disease risk includes arrhythmias, atrial fibrillation, hypertension, coronary artery disease, and stroke. Metabolic disorders include diabetes type 2, non-alcoholic fatty liver disease. Mood disorder especially depression; and cognitive decline especially in the elderly. Associations with chronic reflux/Weston’s esophagus some but not all inclusive. \par -Reasons include arousal consistent with hypopnea; respiratory events most prominent in REM sleep or supine position; therefore sleep staging and body position are important for accurate diagnosis and estimation of AHI. \par \par According to the National Heart, Lung and Blood [place], CPAP or continuous positive airway pressure is a treatment that uses mild air pressure to keep breathing airways open. A CPAP machine includes a mask or other device that fits over the nose or nose and mouth. The mask is connected to a machine via the tube through which humidified air is blown. In the cases of obstructive sleep apnea, CPAP can reverse the complete blockages or narrowing of upper airways. Following diagnosis, CPAP machine pressures can be determined by a CPAP titration. Individuals who require CPAP can choose among masks and equipment that meet prescription and maximize comfort. Many become accustomed right away while others could require more time. Problems include uncomfortable masks or air leakage which can be adjusted to optimize compliance. \par \par \par  problem 5: GERD\par -add Protonix 40 mg QAM, pre-breakfast \par -Continue Pepcid 40 mg QHS \par -Rule of 2s: avoid eating too much, eating too fast, eating too late, eating too spicy, eating too lousy, eating two hours before bed.\par -Things to avoid including overeating, spicy foods, tight clothing, eating within three hours of bed, this list is not all inclusive. \par -For treatment of reflux, possible options discussed including diet control, H2 blockers, PPIs, as well as coating motility agents discussed as treatment options. Timing of meals and proximity of last meal to sleep were discussed. If symptoms persist, a formal gastrointestinal evaluation is needed.\par \par \par Problem 6: Poor Mechanics of Breathing\par -recommended Wim Hof and Buteyko breathing techniques \par - Proper breathing techniques were reviewed with an emphasis of exhalation. Patient instructed to breath in for 1 second and out for four seconds. Patient was encouraged to not talk while walking. \par \par Problem 7: Overweight\par - Recommended "10-Day Detox" by Curtis Marin for 2-3 weeks followed by probiotics \par -Recommend "Muniq" OTC Supplement \par -Weight loss, exercise, and diet control were discussed and are highly encouraged. Treatment options were given such as, aqua therapy, and contacting a nutritionist. Recommended to use the elliptical, stationary bike, less use of treadmill. Mindful eating was explained to the patient Obesity is associated with worsening asthma, shortness of breath, and potential for cardiac disease, diabetes, and other underlying medical conditions. \par \par Problem 8: Cardiac\par -recommended to follow up with a cardiologist\par \par Problem 9: health maintenance \par -orthopedist Dr. Anderson recommended\par -s/p covid 19 vaccine x3\par -Recommended not to get a COVID-19 booster at this time until it is updated against the current variants \par -s/p influenza vaccine 2021 \par -recommended strep pneumonia vaccines after age 65: Prevnar-13 vaccine, followed by Pneumo vaccine 23 one year following\par -recommended early intervention for URIs\par -recommended regular osteoporosis evaluations\par -recommended early dermatological evaluations\par -recommended after the age of 50 to the age of 70, colonoscopy every 5 years \par \par  Follow up in 6-8 weeks\par -he  is recommended to call with any changes, questions, or concerns.

## 2022-09-23 ENCOUNTER — RX RENEWAL (OUTPATIENT)
Age: 67
End: 2022-09-23

## 2022-12-03 LAB
25(OH)D3 SERPL-MCNC: 48.3 NG/ML
ALBUMIN SERPL ELPH-MCNC: 4.2 G/DL
ALP BLD-CCNC: 95 U/L
ALT SERPL-CCNC: 19 U/L
ANION GAP SERPL CALC-SCNC: 12 MMOL/L
APPEARANCE: CLEAR
AST SERPL-CCNC: 23 U/L
BACTERIA: NEGATIVE
BASOPHILS # BLD AUTO: 0.04 K/UL
BASOPHILS NFR BLD AUTO: 0.7 %
BILIRUB SERPL-MCNC: 0.4 MG/DL
BILIRUBIN URINE: NEGATIVE
BLOOD URINE: NEGATIVE
BUN SERPL-MCNC: 13 MG/DL
CALCIUM SERPL-MCNC: 9.5 MG/DL
CHLORIDE SERPL-SCNC: 103 MMOL/L
CHOLEST SERPL-MCNC: 172 MG/DL
CO2 SERPL-SCNC: 27 MMOL/L
COLOR: COLORLESS
CREAT SERPL-MCNC: 0.73 MG/DL
EGFR: 90 ML/MIN/1.73M2
EOSINOPHIL # BLD AUTO: 0.11 K/UL
EOSINOPHIL NFR BLD AUTO: 1.8 %
ESTIMATED AVERAGE GLUCOSE: 128 MG/DL
GLUCOSE QUALITATIVE U: NEGATIVE
GLUCOSE SERPL-MCNC: 103 MG/DL
HBA1C MFR BLD HPLC: 6.1 %
HCT VFR BLD CALC: 42.8 %
HDLC SERPL-MCNC: 55 MG/DL
HGB BLD-MCNC: 13.5 G/DL
HYALINE CASTS: 0 /LPF
IMM GRANULOCYTES NFR BLD AUTO: 0.2 %
KETONES URINE: NEGATIVE
LDLC SERPL CALC-MCNC: 94 MG/DL
LEUKOCYTE ESTERASE URINE: NEGATIVE
LYMPHOCYTES # BLD AUTO: 1.66 K/UL
LYMPHOCYTES NFR BLD AUTO: 27.8 %
MAN DIFF?: NORMAL
MCHC RBC-ENTMCNC: 29.5 PG
MCHC RBC-ENTMCNC: 31.5 GM/DL
MCV RBC AUTO: 93.4 FL
MICROSCOPIC-UA: NORMAL
MONOCYTES # BLD AUTO: 0.5 K/UL
MONOCYTES NFR BLD AUTO: 8.4 %
NEUTROPHILS # BLD AUTO: 3.65 K/UL
NEUTROPHILS NFR BLD AUTO: 61.1 %
NITRITE URINE: NEGATIVE
NONHDLC SERPL-MCNC: 117 MG/DL
PH URINE: 7
PLATELET # BLD AUTO: 219 K/UL
POTASSIUM SERPL-SCNC: 4.5 MMOL/L
PROT SERPL-MCNC: 6.9 G/DL
PROTEIN URINE: NEGATIVE
RBC # BLD: 4.58 M/UL
RBC # FLD: 12.9 %
RED BLOOD CELLS URINE: 1 /HPF
SODIUM SERPL-SCNC: 142 MMOL/L
SPECIFIC GRAVITY URINE: 1.01
SQUAMOUS EPITHELIAL CELLS: 1 /HPF
TRIGL SERPL-MCNC: 116 MG/DL
UROBILINOGEN URINE: NORMAL
WBC # FLD AUTO: 5.97 K/UL
WHITE BLOOD CELLS URINE: 3 /HPF

## 2022-12-05 ENCOUNTER — APPOINTMENT (OUTPATIENT)
Dept: INTERNAL MEDICINE | Facility: CLINIC | Age: 67
End: 2022-12-05

## 2022-12-05 ENCOUNTER — NON-APPOINTMENT (OUTPATIENT)
Age: 67
End: 2022-12-05

## 2022-12-05 VITALS — RESPIRATION RATE: 14 BRPM | HEART RATE: 78 BPM | SYSTOLIC BLOOD PRESSURE: 136 MMHG | DIASTOLIC BLOOD PRESSURE: 78 MMHG

## 2022-12-05 DIAGNOSIS — Z87.442 PERSONAL HISTORY OF URINARY CALCULI: ICD-10-CM

## 2022-12-05 DIAGNOSIS — Z23 ENCOUNTER FOR IMMUNIZATION: ICD-10-CM

## 2022-12-05 DIAGNOSIS — Z86.03 PERSONAL HISTORY OF NEOPLASM OF UNCERTAIN BEHAVIOR: ICD-10-CM

## 2022-12-05 PROCEDURE — G0009: CPT

## 2022-12-05 PROCEDURE — 90677 PCV20 VACCINE IM: CPT

## 2022-12-05 PROCEDURE — G0439: CPT | Mod: 25

## 2022-12-05 NOTE — HISTORY OF PRESENT ILLNESS
[FreeTextEntry1] : Ms. CASIANO is here for an annual physical examination and assessment.\par  [de-identified] : She generally feels well with no specific complaints. Her recent health has been good.\par Post COVID cough seems better. \par Wants a refill of lidocaine viscous for irritation from nasal drip.  \par \par Denies headache, dizziness.\par Denies rash, fatigue, fever, weight loss, anorexia.\par Denies cough, wheezing.\par Denies CP, SOB, EASON, edema, palpitations.\par Denies abdominal pain, N/V/D/C. Denies BRBPR, melena, dysphagia.\par Denies dysuria, frequency, hematuria, hesitancy.\par Denies weakness, numbness, gait instability.\par

## 2022-12-05 NOTE — HEALTH RISK ASSESSMENT
[Excellent] : ~his/her~  mood as  excellent [Never] : Never [Yes] : Yes [Monthly or less (1 pt)] : Monthly or less (1 point) [3 or 4 (1 pt)] : 3 or 4  (1 point) [Never (0 pts)] : Never (0 points) [No] : In the past 12 months have you used drugs other than those required for medical reasons? No [No falls in past year] : Patient reported no falls in the past year [0] : 2) Feeling down, depressed, or hopeless: Not at all (0) [PHQ-2 Negative - No further assessment needed] : PHQ-2 Negative - No further assessment needed [Audit-CScore] : 2 [[id number]] : 0 [Change in mental status noted] : No change in mental status noted [Language] : denies difficulty with language [Behavior] : denies difficulty with behavior [Learning/Retaining New Information] : denies difficulty learning/retaining new information [Handling Complex Tasks] : denies difficulty handling complex tasks [Reasoning] : denies difficulty with reasoning [Spatial Ability and Orientation] : denies difficulty with spatial ability and orientation [None] : None [With Family] : lives with family [Employed] : employed [] :  [Feels Safe at Home] : Feels safe at home [Fully functional (bathing, dressing, toileting, transferring, walking, feeding)] : Fully functional (bathing, dressing, toileting, transferring, walking, feeding) [Fully functional (using the telephone, shopping, preparing meals, housekeeping, doing laundry, using] : Fully functional and needs no help or supervision to perform IADLs (using the telephone, shopping, preparing meals, housekeeping, doing laundry, using transportation, managing medications and managing finances) [Reports changes in hearing] : Reports no changes in hearing [Reports changes in vision] : Reports no changes in vision [Reports normal functional visual acuity (ie: able to read med bottle)] : Reports normal functional visual acuity [Reports changes in dental health] : Reports no changes in dental health [Smoke Detector] : smoke detector [Seat Belt] :  uses seat belt

## 2022-12-12 ENCOUNTER — RX RENEWAL (OUTPATIENT)
Age: 67
End: 2022-12-12

## 2022-12-29 ENCOUNTER — APPOINTMENT (OUTPATIENT)
Dept: PULMONOLOGY | Facility: CLINIC | Age: 67
End: 2022-12-29

## 2022-12-29 VITALS
DIASTOLIC BLOOD PRESSURE: 66 MMHG | BODY MASS INDEX: 29.02 KG/M2 | OXYGEN SATURATION: 97 % | TEMPERATURE: 96 F | SYSTOLIC BLOOD PRESSURE: 124 MMHG | WEIGHT: 170 LBS | HEART RATE: 76 BPM | HEIGHT: 64 IN | RESPIRATION RATE: 14 BRPM

## 2022-12-29 DIAGNOSIS — J30.0 VASOMOTOR RHINITIS: ICD-10-CM

## 2022-12-29 DIAGNOSIS — U09.9 POST COVID-19 CONDITION, UNSPECIFIED: ICD-10-CM

## 2022-12-29 PROCEDURE — 99214 OFFICE O/P EST MOD 30 MIN: CPT | Mod: 25

## 2022-12-29 PROCEDURE — ZZZZZ: CPT

## 2022-12-29 PROCEDURE — 94727 GAS DIL/WSHOT DETER LNG VOL: CPT

## 2022-12-29 PROCEDURE — 94729 DIFFUSING CAPACITY: CPT

## 2022-12-29 PROCEDURE — 95012 NITRIC OXIDE EXP GAS DETER: CPT

## 2022-12-29 PROCEDURE — 94010 BREATHING CAPACITY TEST: CPT

## 2022-12-29 NOTE — HISTORY OF PRESENT ILLNESS
[TextBox_4] : Ms. CASIANO is a 67 year old female with a history of allergies, GERD, overweight, COLLINS, RADS, Covid-19 pneumonitis, post nasal drip presenting to the office today for a follow up pulmonary evaluation. Her chief complaint is \par \par -she notes labored breathing intermittently\par -she notes muscular pains\par -she notes she has been taking desloratadine, Breztri, and montelukast every day\par -she notes itchy ears\par -she notes that she has gained weight\par -she notes her HbA1c is at 6.0%\par -she notes she has been put on Metformin\par -she notes her sinuses are non-problematic at the moment \par -she denies exercising \par -she notes she has been using her CPAP\par \par \par -patient denies any headaches, nausea, vomiting, fever, chills, sweats, chest pain, chest pressure, palpitations, coughing, wheezing, fatigue, diarrhea, constipation, dysphagia, dizziness, leg swelling, leg pain, heartburn, reflux or sour taste in the mouth

## 2022-12-29 NOTE — PROCEDURE
[FreeTextEntry1] : Full PFT revealed normal flows, with a FEV1 of 2.26L, which is 97% of predicted, normal lung volumes, and a diffusion of 16.1, which is 77% of predicted, with a normal flow volume loop \par \par Feno was 38; a normal value being less than 25. Fractional exhaled nitric oxide (FENO) is regarded as a simple, noninvasive method for assessing eosinophilic airway inflammation. Produced by a variety of cells within the lung, nitric oxide (NO) concentrations are generally low in healthy individuals. However, high concentrations of NO appear to be involved in nonspecific host defense mechanisms and chronic inflammatory  diseases such as asthma. The American Thoracic Society (ATS) therefore recommended using FENO to aid in the diagnosis and monitoring of eosinophilic airway inflammation and asthma, and for identifying steroid responsive individuals whose chronic respiratory symptoms may be caused by airway inflammation

## 2022-12-29 NOTE — ADDENDUM
[FreeTextEntry1] : Documented by Steve Mckeon acting as a scribe for Dr. Balta Verma on 12/29/2022.\par \par All medical record entries made by the Scribe were at my, Dr. Balta Verma's, direction and personally dictated by me on 12/29/2022. I have reviewed the chart and agree that the record accurately reflects my personal performance of the history, physical exam, assessment and plan. I have also personally directed, reviewed, and agree with the discharge instructions.

## 2022-12-29 NOTE — ASSESSMENT
[FreeTextEntry1] : Ms. CASIANO is a 67 year old female with a history of  allergies, GERD, overweight, COLLINS, RADS, Covid-19 pneumonitis 1/2021, post nasal drip who comes into the office today for pulmonary evaluation s/p COVID -19 pneumonitis with residual SOB (better with meds) - now c/w progressive SOB (exertion over rest) / fatigue on exertion- stable over the year despite COVID-19 11/2022\par \par The patient's shortness of breath is multifactorial due to:\par -pulmonary disease \par      - Asthmatic symptoms \par - s/p COVID -19 pneumonitis \par -poor breathing mechanics \par    - anxiety \par -overweight/out of shape\par -?cardiac disease (doubt) \par \par \par Problem 1: s/p covid -19 pneumonitis (resolved) ?PE\par -s/p covid 19 vaccine x3 \par - recommend NAC 900mg BID\par - s/p f/u CT scan - next 6/2023 \par - s/p CTPA -next 6/2022 (improved COVID-19 PNA) \par \par Problem 2: RADS- (semi active) - non compliant\par -add Xopenex 0.63 via nebulizer q6H prn (1st) \par - continue Breztri 2 puffs BID \par -continue Singulair 10 mg QHS \par -continue Albuterol via nebulizer, Q6H \par -Asthma is believed to be caused by inherited (genetic) and environmental factor, but its exact cause is unknown. Asthma may be triggered by allergens, lung infections, or irritants in the air. Asthma triggers are different for each person\par \par Information sheet given to the patient to be reviewed, this medication is never to be used without consulting the prescribing physician. Proper dietary restraint is necessary specifically salt containing foods, if any reaction may occur should be reported. \par \par \par Problem 3 : Non allergic/ gustatory rhinitis  & sinusitis (quiet)\par -Add Atrovent (0.6) nasal spray 2 sniff BID \par - Add Qnasl 1 sniff each nostril BID or Add Flonase 1 sniff/nostril BID \par -continue Clarinex 5 mg QAM \par - Environmental measures for allergies were encouraged including mattress and pillow cover, air purifier, and environmental controls. \par \par Problem 4 :OSAS - [employer] \par -repeat home sleep study (CPAP study) and re-evaluate CPAP usage- mask of choice \par - Continue CPAP machine \par Sleep apnea is associated with adverse clinical consequences which an affect most organ systems. Cardiovascular disease risk includes arrhythmias, atrial fibrillation, hypertension, coronary artery disease, and stroke. Metabolic disorders include diabetes type 2, non-alcoholic fatty liver disease. Mood disorder especially depression; and cognitive decline especially in the elderly. Associations with chronic reflux/Weston’s esophagus some but not all inclusive. \par -Reasons include arousal consistent with hypopnea; respiratory events most prominent in REM sleep or supine position; therefore sleep staging and body position are important for accurate diagnosis and estimation of AHI. \par \par According to the National Heart, Lung and Blood [place], CPAP or continuous positive airway pressure is a treatment that uses mild air pressure to keep breathing airways open. A CPAP machine includes a mask or other device that fits over the nose or nose and mouth. The mask is connected to a machine via the tube through which humidified air is blown. In the cases of obstructive sleep apnea, CPAP can reverse the complete blockages or narrowing of upper airways. Following diagnosis, CPAP machine pressures can be determined by a CPAP titration. Individuals who require CPAP can choose among masks and equipment that meet prescription and maximize comfort. Many become accustomed right away while others could require more time. Problems include uncomfortable masks or air leakage which can be adjusted to optimize compliance. \par \par \par  problem 5: GERD\par -add Protonix 40 mg QAM, pre-breakfast \par -Continue Pepcid 40 mg QHS \par -Rule of 2s: avoid eating too much, eating too fast, eating too late, eating too spicy, eating too lousy, eating two hours before bed.\par -Things to avoid including overeating, spicy foods, tight clothing, eating within three hours of bed, this list is not all inclusive. \par -For treatment of reflux, possible options discussed including diet control, H2 blockers, PPIs, as well as coating motility agents discussed as treatment options. Timing of meals and proximity of last meal to sleep were discussed. If symptoms persist, a formal gastrointestinal evaluation is needed.\par \par \par Problem 6: Poor Mechanics of Breathing\par -recommended Wim Hof and Buteyko breathing techniques \par - Proper breathing techniques were reviewed with an emphasis of exhalation. Patient instructed to breath in for 1 second and out for four seconds. Patient was encouraged to not talk while walking. \par \par Problem 7: Overweight\par -Recommend Functional Medicine evaluation with Dr. Alexander and Praveen Amaya \par - Recommended "10-Day Detox" by Curtis Marin for 2-3 weeks followed by probiotics \par -Recommend "Muniq" OTC Supplement \par -Weight loss, exercise, and diet control were discussed and are highly encouraged. Treatment options were given such as, aqua therapy, and contacting a nutritionist. Recommended to use the elliptical, stationary bike, less use of treadmill. Mindful eating was explained to the patient Obesity is associated with worsening asthma, shortness of breath, and potential for cardiac disease, diabetes, and other underlying medical conditions. \par \par Problem 8: Cardiac\par -recommended to follow up with a cardiologist - Pertty \par \par Problem 9: health maintenance \par -orthopedist Dr. Anderson recommended\par -s/p covid 19 vaccine x4\par -Recommended not to get a COVID-19 booster at this time until it is updated against the current variants \par -s/p influenza vaccine 2022\par -recommended strep pneumonia vaccines after age 65: Prevnar-13 vaccine, followed by Pneumo vaccine 23 one year following (completed)\par -recommended early intervention for URIs\par -recommended regular osteoporosis evaluations\par -recommended early dermatological evaluations\par -recommended after the age of 50 to the age of 70, colonoscopy every 5 years \par \par  Follow up in 6-8 weeks\par -he  is recommended to call with any changes, questions, or concerns.

## 2023-01-04 ENCOUNTER — APPOINTMENT (OUTPATIENT)
Dept: OBGYN | Facility: CLINIC | Age: 68
End: 2023-01-04
Payer: COMMERCIAL

## 2023-01-04 VITALS
BODY MASS INDEX: 29.88 KG/M2 | DIASTOLIC BLOOD PRESSURE: 83 MMHG | WEIGHT: 175 LBS | SYSTOLIC BLOOD PRESSURE: 124 MMHG | HEIGHT: 64 IN

## 2023-01-04 DIAGNOSIS — N64.4 MASTODYNIA: ICD-10-CM

## 2023-01-04 DIAGNOSIS — N95.2 POSTMENOPAUSAL ATROPHIC VAGINITIS: ICD-10-CM

## 2023-01-04 PROCEDURE — 99387 INIT PM E/M NEW PAT 65+ YRS: CPT

## 2023-01-04 PROCEDURE — 82270 OCCULT BLOOD FECES: CPT

## 2023-01-04 RX ORDER — ESTRADIOL 10 UG/1
10 TABLET, FILM COATED VAGINAL
Qty: 35 | Refills: 2 | Status: ACTIVE | COMMUNITY
Start: 2023-01-04 | End: 1900-01-01

## 2023-01-05 ENCOUNTER — APPOINTMENT (OUTPATIENT)
Dept: OPHTHALMOLOGY | Facility: CLINIC | Age: 68
End: 2023-01-05
Payer: MEDICARE

## 2023-01-05 ENCOUNTER — NON-APPOINTMENT (OUTPATIENT)
Age: 68
End: 2023-01-05

## 2023-01-05 LAB — HPV HIGH+LOW RISK DNA PNL CVX: NOT DETECTED

## 2023-01-05 PROCEDURE — 92133 CPTRZD OPH DX IMG PST SGM ON: CPT

## 2023-01-05 PROCEDURE — 92014 COMPRE OPH EXAM EST PT 1/>: CPT

## 2023-01-10 ENCOUNTER — APPOINTMENT (OUTPATIENT)
Dept: INTERNAL MEDICINE | Facility: CLINIC | Age: 68
End: 2023-01-10
Payer: MEDICARE

## 2023-01-10 VITALS
DIASTOLIC BLOOD PRESSURE: 78 MMHG | SYSTOLIC BLOOD PRESSURE: 124 MMHG | OXYGEN SATURATION: 98 % | HEART RATE: 80 BPM | BODY MASS INDEX: 30.05 KG/M2 | WEIGHT: 176 LBS | HEIGHT: 64 IN

## 2023-01-10 VITALS — HEART RATE: 70 BPM | SYSTOLIC BLOOD PRESSURE: 130 MMHG | DIASTOLIC BLOOD PRESSURE: 70 MMHG

## 2023-01-10 DIAGNOSIS — R42 DIZZINESS AND GIDDINESS: ICD-10-CM

## 2023-01-10 DIAGNOSIS — N64.4 MASTODYNIA: ICD-10-CM

## 2023-01-10 LAB — CYTOLOGY CVX/VAG DOC THIN PREP: ABNORMAL

## 2023-01-10 PROCEDURE — 99213 OFFICE O/P EST LOW 20 MIN: CPT

## 2023-01-10 RX ORDER — BLOOD-GLUCOSE METER
70 EACH MISCELLANEOUS
Qty: 1 | Refills: 3 | Status: ACTIVE | COMMUNITY
Start: 2023-01-10 | End: 1900-01-01

## 2023-01-10 RX ORDER — ALPRAZOLAM 0.25 MG/1
0.25 TABLET ORAL TWICE DAILY
Qty: 10 | Refills: 0 | Status: COMPLETED | COMMUNITY
Start: 2022-03-01 | End: 2023-01-10

## 2023-01-10 RX ORDER — BLOOD-GLUCOSE METER
W/DEVICE KIT MISCELLANEOUS
Qty: 1 | Refills: 0 | Status: ACTIVE | COMMUNITY
Start: 2023-01-10 | End: 1900-01-01

## 2023-01-10 RX ORDER — LANCETS 33 GAUGE
EACH MISCELLANEOUS
Qty: 1 | Refills: 5 | Status: ACTIVE | COMMUNITY
Start: 2023-01-10 | End: 1900-01-01

## 2023-01-10 RX ORDER — BLOOD-GLUCOSE METER
KIT MISCELLANEOUS TWICE DAILY
Qty: 60 | Refills: 3 | Status: ACTIVE | COMMUNITY
Start: 2023-01-10 | End: 1900-01-01

## 2023-01-10 NOTE — HISTORY OF PRESENT ILLNESS
[FreeTextEntry8] : 67F patient of Dr. Manning with COLLINS on CPAP, allergic rhinitis, type 2 diabetes, osteoporosis, HLD, and HTN presents for an acute visit with symptoms of vertigo. \par \par Reviewed note from Dr. Manning 12/5/22, Dr. Verma 12/29/22: asthmatic symptoms and allergic rhinitis. also tx for collins with cpap. gerd on PPI and [id number], Dr. Hairston 1/4/23: vagifem started. 1/5/23: Dr. Turcios - has floaters, glaucoma suspect, cataracts not severe. \par \par Patient noticed some dizziness. She felt "woozy" going to [facility] soon wants to know if safe to travel. Started 3 days ago. She was eating dinner and she felt dizzy for a moment. She feels like her head is moving in circles. It felt like the pt was moving but she was not. No palpitations. No LOC. She isn't sure if she felt this way before. Patient reports that she had asthma, pneumonitis after COVID she is still struggling with breathing. Over the past 3 days she notices it once a day. She noticed it when walking she felt it but not in the elevator. She reports it is not that pronounced. It does not affect her movement. She does not feel like it is progressing. She noticed it today when working (sitting at computer.) She works as an .

## 2023-01-10 NOTE — PHYSICAL EXAM
[No Respiratory Distress] : no respiratory distress  [Normal Rate] : normal rate  [Coordination Grossly Intact] : coordination grossly intact [Normal Gait] : normal gait [Normal] : affect was normal and insight and judgment were intact [de-identified] : negative angle hallpike. no nystagmus. EOMI normal.

## 2023-01-10 NOTE — ASSESSMENT
[FreeTextEntry1] : 67F patient of Dr. Manning with COLLINS on CPAP, allergic rhinitis, type 2 diabetes, osteoporosis, HLD, and HTN presents for an acute visit with symptoms of vertigo. \par \par Dizziness\par -negative angle hallpike\par -pt not experiencing dizziness now\par -negative orthostatic vs \par -check FS given only new med is metformin, checking for hypoglycemia although unlikely on metformin alone\par -pt reports not getting enough sleep, feeling tired. she will pay attention to symptoms and return if they continue

## 2023-01-11 ENCOUNTER — OUTPATIENT (OUTPATIENT)
Dept: OUTPATIENT SERVICES | Facility: HOSPITAL | Age: 68
LOS: 1 days | End: 2023-01-11
Payer: MEDICARE

## 2023-01-11 ENCOUNTER — APPOINTMENT (OUTPATIENT)
Dept: MAMMOGRAPHY | Facility: CLINIC | Age: 68
End: 2023-01-11
Payer: MEDICARE

## 2023-01-11 ENCOUNTER — RESULT REVIEW (OUTPATIENT)
Age: 68
End: 2023-01-11

## 2023-01-11 ENCOUNTER — RX RENEWAL (OUTPATIENT)
Age: 68
End: 2023-01-11

## 2023-01-11 DIAGNOSIS — N64.4 MASTODYNIA: ICD-10-CM

## 2023-01-11 DIAGNOSIS — Z98.891 HISTORY OF UTERINE SCAR FROM PREVIOUS SURGERY: Chronic | ICD-10-CM

## 2023-01-11 PROCEDURE — G0279: CPT

## 2023-01-11 PROCEDURE — G0279: CPT | Mod: 26

## 2023-01-11 PROCEDURE — 77066 DX MAMMO INCL CAD BI: CPT | Mod: 26

## 2023-01-11 PROCEDURE — 77066 DX MAMMO INCL CAD BI: CPT

## 2023-01-11 RX ORDER — BUDESONIDE, GLYCOPYRROLATE, AND FORMOTEROL FUMARATE 160; 9; 4.8 UG/1; UG/1; UG/1
160-9-4.8 AEROSOL, METERED RESPIRATORY (INHALATION)
Qty: 32.1 | Refills: 1 | Status: ACTIVE | COMMUNITY
Start: 2022-08-24 | End: 1900-01-01

## 2023-01-12 ENCOUNTER — APPOINTMENT (OUTPATIENT)
Dept: ULTRASOUND IMAGING | Facility: CLINIC | Age: 68
End: 2023-01-12
Payer: MEDICARE

## 2023-01-12 ENCOUNTER — RESULT REVIEW (OUTPATIENT)
Age: 68
End: 2023-01-12

## 2023-01-12 PROCEDURE — 76641 ULTRASOUND BREAST COMPLETE: CPT | Mod: 50

## 2023-01-17 ENCOUNTER — APPOINTMENT (OUTPATIENT)
Dept: SURGICAL ONCOLOGY | Facility: CLINIC | Age: 68
End: 2023-01-17
Payer: COMMERCIAL

## 2023-01-17 ENCOUNTER — APPOINTMENT (OUTPATIENT)
Dept: ULTRASOUND IMAGING | Facility: CLINIC | Age: 68
End: 2023-01-17

## 2023-01-17 ENCOUNTER — APPOINTMENT (OUTPATIENT)
Dept: RADIOLOGY | Facility: CLINIC | Age: 68
End: 2023-01-17

## 2023-01-17 ENCOUNTER — APPOINTMENT (OUTPATIENT)
Dept: MAMMOGRAPHY | Facility: CLINIC | Age: 68
End: 2023-01-17

## 2023-01-17 VITALS
HEIGHT: 64 IN | DIASTOLIC BLOOD PRESSURE: 72 MMHG | HEART RATE: 80 BPM | OXYGEN SATURATION: 98 % | SYSTOLIC BLOOD PRESSURE: 140 MMHG | WEIGHT: 174 LBS | BODY MASS INDEX: 29.71 KG/M2 | RESPIRATION RATE: 16 BRPM

## 2023-01-17 PROCEDURE — 99203 OFFICE O/P NEW LOW 30 MIN: CPT

## 2023-01-17 NOTE — PHYSICAL EXAM
[Normal] : normal breast inspection and palpation of axillas [Normal Neck Lymph Nodes] : normal neck lymph nodes  [Normal Supraclavicular Lymph Nodes] : normal supraclavicular lymph nodes [Normal Axillary Lymph Nodes] : normal axillary lymph nodes [Normal] : oriented to person, place and time, with appropriate affect [de-identified] : Normal respiratory effort.

## 2023-01-17 NOTE — REASON FOR VISIT
[Initial Consultation] : an initial consultation for [FreeTextEntry2] : Bilateral Breast pain/soreness

## 2023-01-17 NOTE — RESULTS/DATA
[FreeTextEntry1] : BREAST PATH/RAD REVIEW\par \par 10/14/ 2020 BL SC MG: BIRADs 1, Mixed fibroglandular parenchymal pattern. \par  4/14/ 2022 BL SC MG/US: BIRADs 1, Scattered Fibroglandular density, BL scattered benign type calcs noted, Stable R posterior central  nodular asymmetry \par  1/11/ 2023 BL Dx MG: BIRADs 2, BL scattered benign coarse calcs \par  1/12/ 2023 BL US: BIRADs 1

## 2023-01-17 NOTE — HISTORY OF PRESENT ILLNESS
[de-identified] : MAKSIM CASIANO is a 67 year F for initial evaluation of Bilateral Breast pain/soreness \par \par Referred by: Dr Shelli Hairston (GYN)\par PCP: Dr Curtis Pérez [phone] \par Pulmonologist Dr Balta Verma\par \par Pt reports bilateral sporadic breast pain/soreness that has been occurring for over a year. She complains that breast soreness has worsened recently. She has not worn a bras in past 3 yrs as she is home more now. Last saw GYN on 23 who recommended wearing a bra and referred her for mammo/sono. States that after wearing bra for a week, pain has subsided. \par No breast masses, no nipple discharge, no other breast complaints. \par \par Caffeine: 1/2 cup of coffee a day, No tea, No chocolates\par ETOH: Denies\par \par PMHx: COLLINS on CPAP, allergic rhinitis,  type 2 diabetes, osteoporosis, HLD, hx of COVID infection 2020 with residual effects, HTN \par PSHx:   x2 \par Meds: Telmisartan, Rosuvastatin, Desloratadine, Metformin, Montelukast, Famotidine, GERD, vitamin D, Breztri inhaler BID \par Allergies: Opioids (Tylenol #3)- Dizziness\par Family Hx: Father leukemia (passed at age 73). Denies FHx of breast, ovarian and endometrial/ uterine cancer \par GYN: , menarche age 13, Menopause at 50. Age at first pregnancy 34, : Yes, 2 weeks each \par hx of OCPs (3-4 years) \par Bra Size: 38-40D \par SocHx: denies alcohol, smoking, drug use\par S/p COVID vaccines x2 w/ boosters \par

## 2023-01-17 NOTE — ASSESSMENT
[FreeTextEntry1] : MAKSIM CASIANO is a 67 year F for initial evaluation of Bilateral Breast pain/soreness \par \par We discussed causes of breast pain and ways to reduce Breast pain such as: \par Wearing a properly fitting bra\par Anti-inflammatory meds: Ibuprofen (Motrin) 200mg Po BID x 4 days. \par Decreasing caffeine\par \par We discussed that her clinical breast exam today is unremarkable.   I reviewed her recent breast imaging and we discussed her normal findings.   Breast pain recs as above.   Resume annual breast screening with PCP/Gyn. \par RTO as needed. \par

## 2023-01-23 ENCOUNTER — APPOINTMENT (OUTPATIENT)
Dept: ULTRASOUND IMAGING | Facility: CLINIC | Age: 68
End: 2023-01-23

## 2023-03-05 ENCOUNTER — NON-APPOINTMENT (OUTPATIENT)
Age: 68
End: 2023-03-05

## 2023-03-06 ENCOUNTER — APPOINTMENT (OUTPATIENT)
Dept: ORTHOPEDIC SURGERY | Facility: CLINIC | Age: 68
End: 2023-03-06
Payer: MEDICARE

## 2023-03-06 ENCOUNTER — NON-APPOINTMENT (OUTPATIENT)
Age: 68
End: 2023-03-06

## 2023-03-06 VITALS
OXYGEN SATURATION: 100 % | RESPIRATION RATE: 17 BRPM | BODY MASS INDEX: 28.85 KG/M2 | WEIGHT: 169 LBS | DIASTOLIC BLOOD PRESSURE: 78 MMHG | HEIGHT: 64 IN | HEART RATE: 76 BPM | SYSTOLIC BLOOD PRESSURE: 125 MMHG

## 2023-03-06 PROCEDURE — 99203 OFFICE O/P NEW LOW 30 MIN: CPT | Mod: 57

## 2023-03-06 PROCEDURE — 28470 CLTX METATARSAL FX WO MNP EA: CPT | Mod: LT

## 2023-03-06 PROCEDURE — 73630 X-RAY EXAM OF FOOT: CPT | Mod: LT

## 2023-03-06 NOTE — DISCUSSION/SUMMARY
[de-identified] : 67-year-old female with left foot base of the fifth metatarsal fracture within zone 1, consistent with pseudo Aranda injury.\par Discussed diagnosis and management at length.  We will begin closed nonoperative treatment of this fracture, starting with use of a boot and gradual transition to a sneaker.  Weightbearing as tolerated on left lower extremity.  Risk, benefits, and alternatives discussed.  Discussed possibility of nonunion or malunion which may require open reduction internal fixation in the future.  I recommended ice, elevation, Tylenol or anti-inflammatories as needed.  She is interested in obtaining a walker to help with ambulation.  Follow-up in 4 weeks with repeat x-rays.

## 2023-03-06 NOTE — HISTORY OF PRESENT ILLNESS
[de-identified] : 67-year-old female presents with left foot pain which began when she rolled the ankle while walking on uneven pavement on 2/25/2023 while traveling in the [facility].  She had swelling and difficulty weightbearing, presented to an emergency department where x-rays of the ankle and foot were performed.  She was given a walker boot.  She reports moderate pain and bruising in the foot/ankle, intermittent radiation of the pain to the foot.  She has been nonweightbearing and using a wheelchair.  She has been elevating, applying ice, and taking anti-inflammatories as needed.  No history of foot or ankle injuries in the past.  Pain is mostly at the lateral ankle and lateral foot.\par PMH: Prediabetic, hypertension

## 2023-03-06 NOTE — PHYSICAL EXAM
[de-identified] : General: No acute distress\par Mental: Alert and oriented x3\par Eyes: Conjunctivitis not seen\par Chest: Symmetric chest rise, no audible wheezing\par Skin: Bilateral lower extremities absent from rashes and ulcers\par Abdomen: No distention\par \par Left ankle: Intact skin, no ecchymosis at the ankle.  Swelling laterally.\par Nontender at the medial and lateral malleolus.  No pain at the ankle joint.  5/5 dorsiflexion and plantarflexion, negative anterior and posterior drawer.\par Negative syndesmotic squeeze.\par \par Left foot: Intact skin, ecchymosis laterally at the fifth metatarsal and swelling.  Ecchymosis at the small toes.\par Tenderness at the base of the fifth metatarsal, nontender great toe, nontender small toes.\par Fires EHL/FHL/GS/TA/small toes.  Sensation intact light touch distally.  Palpable DP pulse. [de-identified] : X-rays of the left foot and ankle from 2/25/2023 from the [facility] reviewed.  These are hardcopy x-rays.\par Images show fracture at the base of the fifth metatarsal within zone 1 consistent with pseudo Aranda.\par \par Three-view x-rays of the left foot today demonstrate partially displaced and angulated fracture at the base of the fifth metatarsal in zone 1.

## 2023-03-08 ENCOUNTER — APPOINTMENT (OUTPATIENT)
Dept: ORTHOPEDIC SURGERY | Facility: CLINIC | Age: 68
End: 2023-03-08
Payer: COMMERCIAL

## 2023-03-08 VITALS
HEIGHT: 64 IN | WEIGHT: 169 LBS | SYSTOLIC BLOOD PRESSURE: 151 MMHG | DIASTOLIC BLOOD PRESSURE: 85 MMHG | HEART RATE: 76 BPM | BODY MASS INDEX: 28.85 KG/M2

## 2023-03-08 DIAGNOSIS — M79.662 PAIN IN LEFT LOWER LEG: ICD-10-CM

## 2023-03-08 DIAGNOSIS — S92.352A DISPLACED FRACTURE OF FIFTH METATARSAL BONE, LEFT FOOT, INITIAL ENCOUNTER FOR CLOSED FRACTURE: ICD-10-CM

## 2023-03-08 PROCEDURE — 99024 POSTOP FOLLOW-UP VISIT: CPT

## 2023-03-08 PROCEDURE — 28470 CLTX METATARSAL FX WO MNP EA: CPT | Mod: LT,58

## 2023-03-08 NOTE — PHYSICAL EXAM
[de-identified] : Left foot Physical Examination:\par \par General: Alert and oriented x3.  In no acute distress.  Pleasant in nature with a normal affect.  No apparent respiratory distress. \par Erythema, Warmth, Rubor: Negative\par Swelling: Mild swelling of the ankle.\par \par ROM Ankle:\par 1. Dorsiflexion: 10 degrees\par 2. Plantarflexion: 40 degrees\par 3. Inversion: 30 degrees\par 4. Eversion: 30 degrees\par \par ROM of digits: Normal\par \par Pes Planus: Negative\par Pes Cavus: Negative\par \par Bunion: Negative\par Molly's Bunion (Bunionette): Negative\par Hammer Toe Deformity/Deformities: Negative\par \par Tenderness to Palpation: \par 1. Heel Pain: Negative\par 2. Midfoot Pain: Negative\par 3. First MTP Joint: Negative\par 4. Lis Franc Joint: Negative\par \par Tenderness Metatarsals:\par 1st MT: Negative\par 2nd MT: Negative\par 3rd MT: Negative\par 4th MT: Negative\par 5th MT: Negative\par Base of the 5th MT: Positive\par \par Ligament Pain:\par 1. Lis Franc Ligament: Negative\par 2. Plantar Fascia Ligament: Negative\par \par Strength: \par 5/5 TA/GS/EHL/FHL/EDL/ADD/ABD\par \par Pulses: 2+ DP/PT Pulses\par \par Capillary Refill Toes: <2 seconds\par \par Neuro: Intact motor and sensory throughout\par \par Additional Test:\par 1. Cutler's Squeeze Test: Negative\par 2. Calcaneal Squeeze Test: Negative [de-identified] : 3 views x-rays left foot reviewed, 3/8/2023: Slightly displaced avulsion fracture at the base of the fifth metatarsal.

## 2023-03-08 NOTE — REASON FOR VISIT
[Initial Visit] : an initial visit for [FreeTextEntry2] : Left foot base of fifth metatarsal fracture

## 2023-03-08 NOTE — HISTORY OF PRESENT ILLNESS
[FreeTextEntry1] : The patient is a 67-year-old female who presents with a left foot base of fifth metatarsal fracture that she sustained 2 weeks ago while in the [facility] she accidentally rolled her foot suffering the fracture.  She presents with a short cam walking boot and states that the pain has subsided quite a bit since the initial injury.  She did fly back with the boot on from the [facility] approximately a 14-hour direct flight and she is on aspirin 81 mg daily.  The patient denies fevers, chills, night sweats, shortness of breath, calf pain.  No other complaints.

## 2023-03-08 NOTE — DISCUSSION/SUMMARY
[de-identified] : At this time I reviewed the x-rays with her and showed her the fracture.  I explained to her the fracture can take 6 to 8 weeks to heal completely.  I do want up the patient's aspirin to 325 mg daily for DVT prophylaxis.  The patient will continue to use the short cam walking boot to protect the fracture and was also given a hard sole shoe to wear around the house to protect the fracture for the next 3 weeks.  The patient will continue with RICE therapy for swelling control.  We will see the patient back here in 3 weeks for new x-ray.  If she has no pain then, we can transfer her out of the boot or shoe back into a regular, comfortable supportive sneaker.  All of her and her spouse's questions were answered.  They both understood and agreed to the treatment course.

## 2023-03-10 ENCOUNTER — RX RENEWAL (OUTPATIENT)
Age: 68
End: 2023-03-10

## 2023-03-10 ENCOUNTER — APPOINTMENT (OUTPATIENT)
Dept: ULTRASOUND IMAGING | Facility: CLINIC | Age: 68
End: 2023-03-10
Payer: MEDICARE

## 2023-03-10 PROCEDURE — 93971 EXTREMITY STUDY: CPT | Mod: LT

## 2023-03-23 ENCOUNTER — RX RENEWAL (OUTPATIENT)
Age: 68
End: 2023-03-23

## 2023-03-29 ENCOUNTER — APPOINTMENT (OUTPATIENT)
Dept: ORTHOPEDIC SURGERY | Facility: CLINIC | Age: 68
End: 2023-03-29
Payer: COMMERCIAL

## 2023-03-29 PROCEDURE — 99024 POSTOP FOLLOW-UP VISIT: CPT

## 2023-03-29 PROCEDURE — 73630 X-RAY EXAM OF FOOT: CPT | Mod: LT

## 2023-03-29 NOTE — ADDENDUM
[FreeTextEntry1] : I, JONATHAN HEREDIA, acted solely as a scribe for Dr. Joey Hall on this date 03/29/2023  .\par  \par All medical record entries made by the Scribe were at my, Dr. Joey Hall, direction and personally dictated by me on 03/29/2023 . I have reviewed the chart and agree that the record accurately reflects my personal performance of the history, physical exam, assessment and plan. I have also personally directed, reviewed, and agreed with the chart.

## 2023-03-29 NOTE — HISTORY OF PRESENT ILLNESS
[FreeTextEntry1] : 3/29/2023: The patient is a 67 year old female who presents with her  for a follow-up visit of a left foot base of fifth metatarsal fracture. She states that her symptoms have improved since her previous visit. She still has some discomfort over the fracture sight.  She presents wearing a CAM boot and is ambulating without assistance. She is currently taking 81MG of Aspirin for DVT prophylaxis. The patient denies fevers, chills, night sweats, shortness of breath, calf pain.  No other complaints.\par \par 3/8/2023: The patient is a 67-year-old female who presents with a left foot base of fifth metatarsal fracture that she sustained 2 weeks ago while in the [facility] she accidentally rolled her foot suffering the fracture.  She presents with a short cam walking boot and states that the pain has subsided quite a bit since the initial injury.  She did fly back with the boot on from the [facility] approximately a 14-hour direct flight and she is on aspirin 81 mg daily.  The patient denies fevers, chills, night sweats, shortness of breath, calf pain.  No other complaints.

## 2023-03-29 NOTE — PHYSICAL EXAM
[de-identified] : Left foot Physical Examination:\par \par General: Alert and oriented x3.  In no acute distress.  Pleasant in nature with a normal affect.  No apparent respiratory distress. \par Erythema, Warmth, Rubor: Negative\par Swelling: Mild swelling of the ankle.\par \par ROM Ankle:\par 1. Dorsiflexion: 10 degrees\par 2. Plantarflexion: 40 degrees\par 3. Inversion: 30 degrees\par 4. Eversion: 30 degrees\par \par ROM of digits: Normal\par \par Pes Planus: Negative\par Pes Cavus: Negative\par \par Bunion: Negative\par Molly's Bunion (Bunionette): Negative\par Hammer Toe Deformity/Deformities: Negative\par \par Tenderness to Palpation: \par 1. Heel Pain: Negative\par 2. Midfoot Pain: Negative\par 3. First MTP Joint: Negative\par 4. Lis Franc Joint: Negative\par \par Tenderness Metatarsals:\par 1st MT: Negative\par 2nd MT: Negative\par 3rd MT: Negative\par 4th MT: Negative\par 5th MT: Negative\par Base of the 5th MT: Positive\par \par Ligament Pain:\par 1. Lis Franc Ligament: Negative\par 2. Plantar Fascia Ligament: Negative\par \par Strength: \par 5/5 TA/GS/EHL/FHL/EDL/ADD/ABD\par \par Pulses: 2+ DP/PT Pulses\par \par Capillary Refill Toes: <2 seconds\par \par Neuro: Intact motor and sensory throughout\par \par Additional Test:\par 1. Cutler's Squeeze Test: Negative\par 2. Calcaneal Squeeze Test: Negative [de-identified] : 3 views x-rays left foot reviewed, 3/8/2023: Slightly displaced avulsion fracture at the base of the fifth metatarsal.

## 2023-03-29 NOTE — DISCUSSION/SUMMARY
Referring Provider:Ayden Barba MD   PCP: DELFINA Tolbert    No chief complaint on file.     History of Present Illness:  This is a 63 year old male with complaints of low back pain, and bilateral osteoarthritic knee pain.    This is a patient with chronic low back pain and bilateral knee pain.  He is status post 3 lumbar surgical procedures including a right L4-5 diskectomy in 1998, an L2-3 decompressive laminectomy, 11/14/18, and a bilateral L2-3 fusion, 2/16/20.  He also has compression fractures of L1 and L4.  He is healing well from surgery, and low back pain is slowly improving.  An EMG, 10/7/20, shows bilateral L5-S1 radiculopathy.    The patient is mostly concerned with his bilateral knee pain from severe osteoarthritis.  He is being followed by Orthopedic surgery.  X-rays, 5/15/20, shows tricompartmental osteoarthritis with severe medial joint space narrowing and varus deformity.  He is status post left total knee arthroplasty, 11/19/20.  He is status post right total knee arthroplasty, 3/25/21.    Interval History:  Chronic problem(s) is slowly improving.  Location of pain: Right knee pain.  Radiation of pain: No   Numbness and tingling: No   Weakness: Yes, both legs from knee arthritis.   Onset of pain: problem is longstanding.  Rating of pain:  Left knee pain is 0/10 following surgery.    Right knee pain is 8/10, which is moderately severe.  Low back pain is 0/10.  Quality of pain: aching, dull, and stiffness.  Factors that worsen pain: prolonged standing and prolonged walking.  Factors that alleviate pain: rest.  The patient has tried PT and acupuncture.  He is status post 3 lumbar surgical procedures.  Had a right L4-5 diskectomy in 1998.  He had an L2-3 decompressive laminectomy 11/14/18.  And most recently, he had a bilateral L2-3 fusion, 2/16/20.  He had an L1 compression fracture 4/20/20.  He also has an L4 compression fracture.  His back is healed well following surgery.  He is mostly  [de-identified] : Today I had a lengthy discussion with the patient regarding their left foot base of fifth metatarsal fracture. I have addressed all the patient's concerns surrounding the pathology of their condition. I reviewed the patient's XR with them and they verbalized understanding. She can travel, I advise her to take 325 mg aspirin and compression socks on the plane to prevent blood clot and she should bring the boot with her as well. She can walk as tolerated. I recommend that the patient utilize a stiff shoe. The patient was provided with the stiff shoe in the office today. I recommend that the patient utilize ice, NSAIDS PRN, and heat. They can also elevate their left foot above the level of the heart. She can begin to wear a stiff sneaker in 3-4 weeks. \par \par The patient understood and verbally agreed to the treatment plan. All of their questions were answered and they were satisfied with the visit. The patient should call the office if they have any questions or experience worsening symptoms. \par \par Follow up in 4-6 weeks.  complaining of right knee pain.  He uses a cane.    He is S/P left total knee arthroplasty, 11/19/20.  He is healed well from surgery.   He is S/P right total knee arthroplasty, 3/25/21.    He slipped and fell at home.  His right knee is still painful, rated 8/10.  X-rays were negative.  He is doing PT.    He self-escalated his use of Percocet and is out early two months in a row.  Tox screen, 4/8/21:  Positive for hydrocodone and ethanol.  Inappropriate for ethanol and hydrocodone, and negative for oxycodone.  This will be his last prescription.  He will wean off all opiates this month.    He will follow up with his orthopedic surgeon as concerning persistent right knee pain.    ***  His PCP called in Norco 7.5/325 mg, #20 pills, on 7/15/21, for acute right shoulder pain.     He last saw his orthopedic surgeon, 6/21/21, and was apparently doing well.     He called today and claimed he \"twisted his right knee and it's been inflamed and sore\".  He claims he talked to his surgeon, but that note is not in the EMR.     He was weaned off opiates by me after his last visit with me, 5/25/21.     If this is an acute knee injury, he should go to the Urgent Care, or the ER, to be evaluated.  Or, he could contact his PCP, as he did in July for the acute shoulder problem.  They would be able to issue a small amount of opiate medication, if it is deemed appropriate.     I handle chronic pain issues, not acute injuries.  I deleted the \"pain management agreement\" and the \"long term use of opiates\" out of the problem list.     He could cancel the appointment with me that is scheduled for 8/25/21 because the chronic pain issues have been resolved.        Ortho, 8/20/21: Chip presents today for a follow-up evaluation of his right total knee arthroplasty which occurred on March 19, 2021 by Dr. Steele.  Patient is here because he has been had been in pain in his right knee.  He states he has been working on a hot tub so he has been down  on his knees twisting and turning.  He states he really started to have some discomfort so he tried using ice and heat as well as ibuprofen every 4 hours.  He states that is not help so he wanted to come in just to make sure there was nothing else he could do.    Ortho, 8/20/21: Writer spoke with patient at length. Patient was upset and yelling that he does not want to take Tylenol #3 and that he will need to take 3 at once. Patient advised that he is only prescribed to take 1 tab at a time and to use sparingly. Patient yelled \"OH YEAH, 1 THAT IS LIKE TAKING A BABY ASPIRIN.\" Patient again advised that he should not take more medication that prescribed. Patient talking in circles repeating how frustrated he is that he can't get stronger pain medication. Patient reminded that he was also prescribed a Medrol dospak and that should help with any inflammation that he is having which will also help his pain level. Patient phone call then transferred to Halie Curry NP because patient was so upset about her care and lack of narcotic pain medication. Halie spoke with patient, no new orders placed.       He refused a tox screen at this visit, 5/25/21.  Tox screen ?        ***    Previous pain providers: Yes  Cata Kang NP, 7/21/17:   \"Specifically you received a prescription for Oxycodone on 8/02/017 from [facility] and did not report the prescription to [facility].  Also, positive for tramadol which was not prescribed  Discharged from the pain clinic, 8/7/17\"    Pertinent surgeries: Yes  POORNIMA Wallace, 10/2/20: \"L2-3 decompressive laminectomy DOS 11/14/18  History of right L4-5 discectomy 1998  L2-3 bilateral facetectomies with transforaminal interbody fusion on 02/16/2020  L1 compression fracture sustained 04/20/2020  Chip returns following L2-3 decompression and fusion surgery on 02/16/2020.  He has persistent low back pain.  He has known severe bilateral knee osteoarthritis.  He is currently  getting evaluated by Dr. Steele.\"  11/19/20: Left TKA    Pertinent pain procedures: Yes  1/26/18:  Right L2-3 transforaminal epidural for right L3 radiculopathy; Ghassan  2/28/18:  Right L2-3 transforaminal epidural for right L3 radiculopathy; TELLO Gaming    Pertinent diagnostic studies: Yes  Lumbar x-rays, 10/2/20: 1.  Stable postoperative changes of an L2-L3 laminectomy posterior  interbody fusion with instrumentation and spacer at L2-3 without radiographic evidence of complication.  2. Stable degenerative spondylosis of the remaining lumbar spine as previously described.     Lumbar MRI, 1/3/20: 1. There is stable 2.1 cm spondylolisthesis at L4-5 due to bilateral L4 pars defects with stable obliteration of the central canal/severe central canal stenosis noted. There is stable severe right-sided and moderate-severe left-sided neural foraminal stenosis.     2. At L2-3, there is a large broad-based disc bulge and a 10 x 8 mm synovial cyst arising from the inner margin of the degenerated facet joint  on the right, findings which exert mass effect upon the thecal sac and contribute to severe central canal stenosis. There is severe bilateral  neural foraminal stenosis at this level. These findings have progressed since 12/22/2017.     3. There are probable bilateral L2 pars defects with no spondylolisthesis noted at L2-3 though 4-5 mm spondylolisthesis was demonstrated at L2-3 on the upright radiographs from 12/19/2018.     4. There is moderate-severe bilateral neural foraminal stenosis at L3-4 which is similar to 12/22/2017.     5. Multilevel degenerative disc disease, as above.     6. There is a mild strain involving the right-sided paraspinal musculature at the level of the mid and low lumbar spine.    EMG,  10/7/20, Dr. Mata:   1. Moderate chronic right L5 and S1 radiculopathies without evidence of acute denervation.   2. Severe chronic left S1 radiculopathy without evidence of acute denervation.   3. Severe  acute left L5 radiculopathy with evidence of acute, ongoing denervation.     X-rays, 5/15/20: Both knees demonstrate tricompartmental osteoarthritis. Both knees are severe medial joint space narrowing and varus deformity.    Pertinent laboratories:  Creatinine (mg/dL)   Date Value   07/01/2021 0.80      GFR Estimate, Non  (no units)   Date Value   01/13/2020 >90      No results found for: HGBA1C      Review of Systems   Cardiovascular:        HTN   Gastrointestinal: Positive for heartburn.        PUD  HH   Musculoskeletal: Positive for back pain and joint pain.        Osteoarthritis multiple joints      Social history:  The patient is .  Progeny: Yes, two sons.  The patient does not work. Worked as a cordero.    PHYSICAL EXAM:  There were no vitals taken for this visit.     General: Alert and Oriented to Person, Place, Time, appears stated age, in mild distress, cooperative, appropriately dressed, and appropriately groomed. Well nourished.  Does not appear somnolent nor intoxicated.  Psychiatric: The patient has normal insight and judgment. Affect is normal. The patient's mood is normal, and appropriate for the circumstances. Memory is Intact.  HEENT: He is wearing a face mask in this Covid-19 pandemic.  Musculoskeletal:     Gait: antalgic with assisted device: cane.     Digits and Nails: normal nails without lesions. Digits do show arthritic changes.     Joints: Full range of motion and non-tenderness of all joints, except tenderness to touch, swelling, limited range of motion right knee(s). S/P left total knee arthroplasty, healed well.     Strength: decreased bilateral lower extremities     Spine:         Alignment: Normal        Motion: Decreased extension        Tenderness: positive across lower back        Straight leg raising: Negative bilaterally        Scars: positive midline posterior lumbar and left total knee  Neurologic: Mental status normal.        Reflexes: normal and  symmetrical      Sensation: normal      Current controlled substances:  Ropinirole 0.25 mg, 1-2 tablets daily  Lidoderm 5% patch  Diclofenac 1% gel  Narcan nasal spray  Ibuprofen 800 mg, 1/day; he was warned about potential kidney damage  Plain Tylenol; he was warned to keep total Tylenol dosage to less than 3,000 mg per day  Naproxen 500 mg, 1/night    Percocet 10/325 mg, 3/day (45 MME); this is his last prescription; he is to wean off all opiates.    Previously tried controlled substances:  Hydrocodone; \"not strong enough\"  Stopped MS Contin 15 mg, 2 per day  Tizanidine 4 mg, 1/night    The patient has signed an appropriate pain management agreement.    Toxicology screening has been done.  8/10/16:  Positive for opiates  7/21/17:  Positive for oxycodone and positive for very high levels of tramadol. He could not account for the very high levels of Tramadol in his system.  8/2/17:  Negative for opiates.  10/12/20: Positive for morphine and oxycodone.  Appropriate.  4/8/21:  Positive for hydrocodone and ethanol.  Inappropriate for ethanol and hydrocodone, and negative for oxycodone.    He refused a tox screen at this visit, 5/25/21.    [place] PDMP reviewed; no aberrant behavior identified, prescription authorized.    A pill count has been done and shows an appropriate amount.    Opiate risk assessment tool:  SOAPP = 5; risk level is moderate.    PAIN CONTROL AND FUNCTIONAL ASSESSMENT:  BPI (Brief Pain Inventory)  AVERAGE PAIN LEVEL (Question 5 from BPI): 6/10 (PREVIOUSLY 9/10, 8/10, 4/10, 6/10, 7/10, 8/10, 8/10, 7/10)  BPI FUNCTIONAL INTERFERENCE SCORE (Sum BPI Question 9): 52 (PREVIOUSLY 57, 42, 45, 49, 38, 53, 56, 63)   BPI MOOD INTERFERENCE IN LIFE SCORE (BPI Question 9 B): 7 (PREVIOUSLY 5, 5, 7, 7, 2, 7, 7, 7)    Evaluation for long-term opioid therapy:  Meeting functional goals? Yes  Male patient was advised in an informed consent about potential low testosterone levels with chronic use of  opiates? Yes,10/12/20  Is the patient symptomatic of low testosterone? No  The patient was advised in an informed consent the fact that having opioids continually present in the blood stream increases the annualized risk of dying from a heart problem by 65%.  In light of this, did the patient want to continue their opioids?  Yes, 10/12/20  Has patient been prescribed naloxone for use at home if necessary (greater than or equal to 50 MME's or has respiratory compromise)?  Yes, 2/19/20    Signs of aberrant behavior are present.  The patient was discharged from the Pain Clinic, 8/7/17, for obtaining a prescription from his primary care physician and not notifying the pain clinic, and also being positive for high levels of tramadol which was not prescribed.  He claims [place] “[place]\" [place], and was out early 4/8/21.  He self escalated and is out of Percocet 1 week early 5/25/21.  His last tox screen was positive for alcohol and hydrocodone which was not prescribed, and was negative for oxycodone, 4/8/21.    Risk of opiate abuse: high.    Discussion:   The patient's low back is asymptomatic.  The patient's left knee has healed well and as pain-free.  He is status post right total arthroplasty, 3/25/21, which is healing well.  Right knee is still painful.  He self-escalated his use of Percocet and is out early 2 months in a row.  He was positive for hydrocodone and alcohol, and negative for oxycodone, 4/8/21.  He refused to do a tox screen 5/25/21.   This will be his last prescription.  No further prescriptions for opiates.  He will follow-up with me on an as-needed basis.    Assessment:  No diagnosis found.     Plan:  No orders of the defined types were placed in this encounter.       Follow up:   As needed. ***    No LOS data to display  This includes pre-charting, chart review and documenting.

## 2023-04-02 DIAGNOSIS — Z86.03 PERSONAL HISTORY OF NEOPLASM OF UNCERTAIN BEHAVIOR: ICD-10-CM

## 2023-04-03 ENCOUNTER — APPOINTMENT (OUTPATIENT)
Dept: ORTHOPEDIC SURGERY | Facility: CLINIC | Age: 68
End: 2023-04-03

## 2023-04-04 LAB
ALBUMIN SERPL ELPH-MCNC: 4.3 G/DL
ALP BLD-CCNC: 89 U/L
ALT SERPL-CCNC: 23 U/L
ANION GAP SERPL CALC-SCNC: 13 MMOL/L
AST SERPL-CCNC: 22 U/L
BILIRUB SERPL-MCNC: 0.4 MG/DL
BUN SERPL-MCNC: 15 MG/DL
CALCIUM SERPL-MCNC: 9.7 MG/DL
CHLORIDE SERPL-SCNC: 103 MMOL/L
CHOLEST SERPL-MCNC: 159 MG/DL
CO2 SERPL-SCNC: 24 MMOL/L
CREAT SERPL-MCNC: 0.7 MG/DL
EGFR: 95 ML/MIN/1.73M2
ESTIMATED AVERAGE GLUCOSE: 131 MG/DL
GLUCOSE SERPL-MCNC: 105 MG/DL
HBA1C MFR BLD HPLC: 6.2 %
HDLC SERPL-MCNC: 55 MG/DL
LDLC SERPL CALC-MCNC: 88 MG/DL
NONHDLC SERPL-MCNC: 105 MG/DL
POTASSIUM SERPL-SCNC: 4.1 MMOL/L
PROT SERPL-MCNC: 7.2 G/DL
SODIUM SERPL-SCNC: 140 MMOL/L
TRIGL SERPL-MCNC: 86 MG/DL

## 2023-04-06 ENCOUNTER — RX RENEWAL (OUTPATIENT)
Age: 68
End: 2023-04-06

## 2023-04-06 RX ORDER — CLOBETASOL PROPIONATE 0.5 MG/G
0.05 CREAM TOPICAL TWICE DAILY
Qty: 45 | Refills: 5 | Status: ACTIVE | COMMUNITY
Start: 2016-12-07 | End: 1900-01-01

## 2023-05-03 ENCOUNTER — APPOINTMENT (OUTPATIENT)
Dept: PULMONOLOGY | Facility: CLINIC | Age: 68
End: 2023-05-03
Payer: COMMERCIAL

## 2023-05-03 ENCOUNTER — NON-APPOINTMENT (OUTPATIENT)
Age: 68
End: 2023-05-03

## 2023-05-03 VITALS
HEIGHT: 63 IN | RESPIRATION RATE: 16 BRPM | WEIGHT: 167 LBS | BODY MASS INDEX: 29.59 KG/M2 | TEMPERATURE: 97.4 F | HEART RATE: 76 BPM | DIASTOLIC BLOOD PRESSURE: 70 MMHG | SYSTOLIC BLOOD PRESSURE: 110 MMHG | OXYGEN SATURATION: 97 %

## 2023-05-03 DIAGNOSIS — U07.1 COVID-19: ICD-10-CM

## 2023-05-03 PROCEDURE — 94010 BREATHING CAPACITY TEST: CPT

## 2023-05-03 PROCEDURE — 99214 OFFICE O/P EST MOD 30 MIN: CPT | Mod: 25,CS

## 2023-05-03 PROCEDURE — 95012 NITRIC OXIDE EXP GAS DETER: CPT

## 2023-05-03 RX ORDER — AZELASTINE HYDROCHLORIDE AND FLUTICASONE PROPIONATE 137; 50 UG/1; UG/1
137-50 SPRAY, METERED NASAL
Qty: 3 | Refills: 1 | Status: ACTIVE | COMMUNITY
Start: 2023-05-03 | End: 1900-01-01

## 2023-05-03 NOTE — PHYSICAL EXAM
[No Acute Distress] : no acute distress [Normal Oropharynx] : normal oropharynx [III] : Mallampati Class: III [Normal Appearance] : normal appearance [No Neck Mass] : no neck mass [Normal Rate/Rhythm] : normal rate/rhythm [Normal S1, S2] : normal s1, s2 [No Murmurs] : no murmurs [No Resp Distress] : no resp distress [Clear to Auscultation Bilaterally] : clear to auscultation bilaterally [No Abnormalities] : no abnormalities [Benign] : benign [Normal Gait] : normal gait [No Clubbing] : no clubbing [No Cyanosis] : no cyanosis [No Edema] : no edema [FROM] : FROM [Normal Color/ Pigmentation] : normal color/ pigmentation [No Focal Deficits] : no focal deficits [Oriented x3] : oriented x3 [Normal Affect] : normal affect [TextBox_2] : boot on left leg  [TextBox_68] : I:E ratio 1:3; clear

## 2023-05-03 NOTE — ADDENDUM
[FreeTextEntry1] : Documented by Grady Maravilla acting as a scribe for Dr. Balta Verma on 05/03/2023 .\par \par All medical record entries made by the Scribe were at my, Dr. Balta Verma's, direction and personally dictated by me on 05/03/2023. I have reviewed the chart and agree that the record accurately reflects my personal performance of the history, physical exam, assessment and plan. I have also personally directed, reviewed, and agree with the discharge instructions.

## 2023-05-03 NOTE — PROCEDURE
[FreeTextEntry1] : PFT reveals normal flows, with an FEV1 of 1.95 L, which is 101% of predicted, with a normal flow volume loop.\par PFTs were performed to evaluate for asthma \par \par FENO was 24; a normal value being less than 25\par Fractional exhaled nitric oxide (FENO) is regarded as a simple, noninvasive method for assessing eosinophilic airway inflammation. Produced by a variety of cells within the lung, nitric oxide (NO) concentrations are generally low in healthy individuals. However, high concentrations of NO appear to be involved in nonspecific host defense mechanisms and chronic inflammatory diseases such as asthma. The American Thoracic Society (ATS) therefore has recommended using FENO to aid in the diagnosis and monitoring of eosinophilic airway inflammation and asthma, and for identifying steroid responsive individuals whose chronic respiratory symptoms may be caused by airway inflammation.

## 2023-05-03 NOTE — ASSESSMENT
[FreeTextEntry1] : Ms. CASIANO is a 67 year old female with a history of  allergies, GERD, overweight, COLLINS, RADS, Covid-19 pneumonitis 1/2021, post nasal drip who comes into the office today for pulmonary evaluation s/p COVID -19 pneumonitis with residual SOB (better with meds) - s/p  progressive SOB (exertion over rest) / fatigue on exertion- stable over the year despite COVID-19 11/2022- mild nasal Sx\par \par The patient's shortness of breath is multifactorial due to:\par -pulmonary disease \par      - Asthmatic symptoms \par - s/p COVID -19 pneumonitis \par -poor breathing mechanics \par    - anxiety \par -overweight/out of shape\par -?cardiac disease (doubt) \par \par \par Problem 1: s/p covid -19 pneumonitis (resolved) ?PE\par -s/p covid 19 vaccine x3 \par - recommend NAC 900mg BID\par - s/p f/u CT scan - next 6/2023 \par - s/p CTPA -next 6/2022 (improved COVID-19 PNA) \par \par Problem 2: RADS- (semi active) - non compliant\par -add Xopenex 0.63 via nebulizer q6H prn (1st) \par - continue Breztri 2 puffs BID \par -continue Singulair 10 mg QHS \par -continue Albuterol via nebulizer, Q6H \par -Asthma is believed to be caused by inherited (genetic) and environmental factor, but its exact cause is unknown. Asthma may be triggered by allergens, lung infections, or irritants in the air. Asthma triggers are different for each person\par \par Information sheet given to the patient to be reviewed, this medication is never to be used without consulting the prescribing physician. Proper dietary restraint is necessary specifically salt containing foods, if any reaction may occur should be reported. \par \par Problem 3 : Non allergic/ gustatory rhinitis  & sinusitis - active \par -add Dymista 1 sniff each nostril BID \par -Add Atrovent (0.6) nasal spray 2 sniff BID \par -continue Clarinex 5 mg QAM \par - Environmental measures for allergies were encouraged including mattress and pillow cover, air purifier, and environmental controls. \par \par Problem 4 :OSAS - [employer] \par -repeat home sleep study (CPAP study) and re-evaluate CPAP usage- mask of choice \par - Continue CPAP machine \par Sleep apnea is associated with adverse clinical consequences which an affect most organ systems. Cardiovascular disease risk includes arrhythmias, atrial fibrillation, hypertension, coronary artery disease, and stroke. Metabolic disorders include diabetes type 2, non-alcoholic fatty liver disease. Mood disorder especially depression; and cognitive decline especially in the elderly. Associations with chronic reflux/Weston’s esophagus some but not all inclusive. \par -Reasons include arousal consistent with hypopnea; respiratory events most prominent in REM sleep or supine position; therefore sleep staging and body position are important for accurate diagnosis and estimation of AHI. \par \par According to the National Heart, Lung and Blood [place], CPAP or continuous positive airway pressure is a treatment that uses mild air pressure to keep breathing airways open. A CPAP machine includes a mask or other device that fits over the nose or nose and mouth. The mask is connected to a machine via the tube through which humidified air is blown. In the cases of obstructive sleep apnea, CPAP can reverse the complete blockages or narrowing of upper airways. Following diagnosis, CPAP machine pressures can be determined by a CPAP titration. Individuals who require CPAP can choose among masks and equipment that meet prescription and maximize comfort. Many become accustomed right away while others could require more time. Problems include uncomfortable masks or air leakage which can be adjusted to optimize compliance. \par \par \par  problem 5: GERD\par -add Protonix 40 mg QAM, pre-breakfast \par -Continue Pepcid 40 mg QHS \par -Rule of 2s: avoid eating too much, eating too fast, eating too late, eating too spicy, eating too lousy, eating two hours before bed.\par -Things to avoid including overeating, spicy foods, tight clothing, eating within three hours of bed, this list is not all inclusive. \par -For treatment of reflux, possible options discussed including diet control, H2 blockers, PPIs, as well as coating motility agents discussed as treatment options. Timing of meals and proximity of last meal to sleep were discussed. If symptoms persist, a formal gastrointestinal evaluation is needed.\par \par \par Problem 6: Poor Mechanics of Breathing\par -recommended Wim Hof and Buteyko breathing techniques \par - Proper breathing techniques were reviewed with an emphasis of exhalation. Patient instructed to breath in for 1 second and out for four seconds. Patient was encouraged to not talk while walking. \par \par Problem 7: Overweight\par -Recommend Functional Medicine evaluation with Dr. Alexander and Praveen Amaya \par - Recommended "10-Day Detox" by Curtis Marin for 2-3 weeks followed by probiotics \par -Recommend "Muniq" OTC Supplement \par -Weight loss, exercise, and diet control were discussed and are highly encouraged. Treatment options were given such as, aqua therapy, and contacting a nutritionist. Recommended to use the elliptical, stationary bike, less use of treadmill. Mindful eating was explained to the patient Obesity is associated with worsening asthma, shortness of breath, and potential for cardiac disease, diabetes, and other underlying medical conditions. \par \par Problem 8: Cardiac\par -recommended to follow up with a cardiologist - [place] \par \par Problem 9: health maintenance \par -orthopedist Dr. Anderson recommended\par -s/p covid 19 vaccine x4\par -Recommended not to get a COVID-19 booster at this time until it is updated against the current variants \par -s/p influenza vaccine 2022\par -recommended strep pneumonia vaccines after age 65: Prevnar-13 vaccine, followed by Pneumo vaccine 23 one year following (completed)\par -recommended early intervention for URIs\par -recommended regular osteoporosis evaluations\par -recommended early dermatological evaluations\par -recommended after the age of 50 to the age of 70, colonoscopy every 5 years \par \par  Follow up in 6-8 weeks\par -he  is recommended to call with any changes, questions, or concerns.

## 2023-05-04 ENCOUNTER — APPOINTMENT (OUTPATIENT)
Dept: ORTHOPEDIC SURGERY | Facility: CLINIC | Age: 68
End: 2023-05-04
Payer: COMMERCIAL

## 2023-05-04 PROCEDURE — 73630 X-RAY EXAM OF FOOT: CPT | Mod: LT

## 2023-05-04 PROCEDURE — 99024 POSTOP FOLLOW-UP VISIT: CPT

## 2023-05-04 NOTE — HISTORY OF PRESENT ILLNESS
[FreeTextEntry1] : 5/4/2023: The patient is a 67 year old female presenting for a follow-up visit of her left foot, base of fifth metatarsal fracture. The patient states that her symptoms have improved since her previous visit. She presents today wearing a CAM boot and is ambulating without assistance. No other complaints. \par \par 3/29/2023: The patient is a 67 year old female who presents with her  for a follow-up visit of a left foot base of fifth metatarsal fracture. She states that her symptoms have improved since her previous visit. She still has some discomfort over the fracture sight.  She presents wearing a CAM boot and is ambulating without assistance. She is currently taking 81MG of Aspirin for DVT prophylaxis. The patient denies fevers, chills, night sweats, shortness of breath, calf pain.  No other complaints.\par \par 3/8/2023: The patient is a 67-year-old female who presents with a left foot base of fifth metatarsal fracture that she sustained 2 weeks ago while in the [facility] she accidentally rolled her foot suffering the fracture.  She presents with a short cam walking boot and states that the pain has subsided quite a bit since the initial injury.  She did fly back with the boot on from the [facility] approximately a 14-hour direct flight and she is on aspirin 81 mg daily.  The patient denies fevers, chills, night sweats, shortness of breath, calf pain.  No other complaints.

## 2023-05-04 NOTE — PHYSICAL EXAM
[de-identified] : Left foot Physical Examination:\par \par General: Alert and oriented x3.  In no acute distress.  Pleasant in nature with a normal affect.  No apparent respiratory distress. \par Erythema, Warmth, Rubor: Negative\par Swelling: Mild swelling of the ankle.\par \par ROM Ankle:\par 1. Dorsiflexion: 10 degrees\par 2. Plantarflexion: 40 degrees\par 3. Inversion: 30 degrees\par 4. Eversion: 30 degrees\par \par ROM of digits: Normal\par \par Pes Planus: Negative\par Pes Cavus: Negative\par \par Bunion: Negative\par Molly's Bunion (Bunionette): Negative\par Hammer Toe Deformity/Deformities: Negative\par \par Tenderness to Palpation: \par 1. Heel Pain: Negative\par 2. Midfoot Pain: Negative\par 3. First MTP Joint: Negative\par 4. Lis Franc Joint: Negative\par \par Tenderness Metatarsals:\par 1st MT: Negative\par 2nd MT: Negative\par 3rd MT: Negative\par 4th MT: Negative\par 5th MT: Negative\par Base of the 5th MT: Positive\par \par Ligament Pain:\par 1. Lis Franc Ligament: Negative\par 2. Plantar Fascia Ligament: Negative\par \par Strength: \par 5/5 TA/GS/EHL/FHL/EDL/ADD/ABD\par \par Pulses: 2+ DP/PT Pulses\par \par Capillary Refill Toes: <2 seconds\par \par Neuro: Intact motor and sensory throughout\par \par Additional Test:\par 1. Cutler's Squeeze Test: Negative\par 2. Calcaneal Squeeze Test: Negative [de-identified] : 3V of the left foot were ordered obtained and reviewed by me today, 05/04/2023 , revealed: slightly displaced avulsion fracture at the base of the 5th metatarsal. \par

## 2023-05-04 NOTE — DISCUSSION/SUMMARY
[de-identified] : Today I had a lengthy discussion with the patient regarding their left foot, base of fifth metatarsal fracture. I have addressed all the patient's concerns surrounding the pathology of their condition. I recommend the patient transition from the CAM boot to a stiff sneakers like New [employer] or Hoka. She can begin to start walking and returning to conservative activity. I recommend that the patient utilize ice, NSAIDS PRN, and heat. I recommend that the patient utilize 50,000 units of vitamin D. I recommend the patient undergo a course of physical therapy for the left foot  2-3 times a week for a total of 8-12 weeks. A prescription was given for the physical therapy today. \par \par The patient understood and verbally agreed to the treatment plan. All of their questions were answered and they were satisfied with the visit. The patient should call the office if they have any questions or experience worsening symptoms. \par \par Follow up in 6 weeks.

## 2023-05-04 NOTE — ADDENDUM
[FreeTextEntry1] : I, JONATHAN LAURENAVERY, acted solely as a scribe for Dr. Joey Hall on this date 05/04/2023  .\par  \par All medical record entries made by the Scribe were at my, Dr. Joey Hall, direction and personally dictated by me on 05/04/2023 . I have reviewed the chart and agree that the record accurately reflects my personal performance of the history, physical exam, assessment and plan. I have also personally directed, reviewed, and agreed with the chart.

## 2023-05-08 ENCOUNTER — APPOINTMENT (OUTPATIENT)
Dept: INTERNAL MEDICINE | Facility: CLINIC | Age: 68
End: 2023-05-08
Payer: COMMERCIAL

## 2023-05-08 PROCEDURE — 97802 MEDICAL NUTRITION INDIV IN: CPT

## 2023-06-05 ENCOUNTER — APPOINTMENT (OUTPATIENT)
Dept: INTERNAL MEDICINE | Facility: CLINIC | Age: 68
End: 2023-06-05
Payer: COMMERCIAL

## 2023-06-05 PROCEDURE — 97803 MED NUTRITION INDIV SUBSEQ: CPT

## 2023-06-07 ENCOUNTER — APPOINTMENT (OUTPATIENT)
Dept: INTERNAL MEDICINE | Facility: CLINIC | Age: 68
End: 2023-06-07
Payer: COMMERCIAL

## 2023-06-07 ENCOUNTER — NON-APPOINTMENT (OUTPATIENT)
Age: 68
End: 2023-06-07

## 2023-06-07 VITALS — RESPIRATION RATE: 14 BRPM | SYSTOLIC BLOOD PRESSURE: 118 MMHG | DIASTOLIC BLOOD PRESSURE: 64 MMHG | HEART RATE: 74 BPM

## 2023-06-07 LAB — HBA1C MFR BLD HPLC: 5.8

## 2023-06-07 PROCEDURE — 93000 ELECTROCARDIOGRAM COMPLETE: CPT

## 2023-06-07 PROCEDURE — 99213 OFFICE O/P EST LOW 20 MIN: CPT | Mod: 25

## 2023-06-07 PROCEDURE — 83036 HEMOGLOBIN GLYCOSYLATED A1C: CPT | Mod: NC,QW

## 2023-06-07 NOTE — HISTORY OF PRESENT ILLNESS
[de-identified] : Ms. CASIANO comes in for follow-up of pre-diabetes mellitus.  Her  FS at home in range of 100-120There have been  no hypoglycemic reactions.  She  denies foot lesions and dysthesias. There has been no chest pain, shortness of breath or edema.  Denies visual problems,  nocturia and  weight change.\par \par She states adherence to medication regimen.\par

## 2023-06-07 NOTE — ASSESSMENT
[FreeTextEntry1] : Goal A1c LT 7%  \par At goal   5.8\par Limit carbohydrates in diet\par Exercise 5-6 days per week\par Advocated dilated retinal exam yearly\par Proper foot care reviewed\par \par \par

## 2023-06-14 ENCOUNTER — APPOINTMENT (OUTPATIENT)
Dept: ORTHOPEDIC SURGERY | Facility: CLINIC | Age: 68
End: 2023-06-14
Payer: COMMERCIAL

## 2023-06-14 PROCEDURE — 73630 X-RAY EXAM OF FOOT: CPT | Mod: LT

## 2023-06-14 PROCEDURE — 99213 OFFICE O/P EST LOW 20 MIN: CPT

## 2023-06-14 NOTE — DISCUSSION/SUMMARY
[de-identified] : Today I had a lengthy discussion with the patient regarding their left foot fracture. I have addressed all the patient's concerns surrounding the pathology of their condition. I have reviewed the patient's XR imaging with them in great detail. She can continue with physical therapy and I will renew her physical therapy prescription. She can return to her normal activities as tolerated. \par \par The patient understood and verbally agreed to the treatment plan. All of their questions were answered and they were satisfied with the visit. The patient should call the office if they have any questions or experience worsening symptoms. \par \par Follow up in 2-3 months.

## 2023-06-14 NOTE — HISTORY OF PRESENT ILLNESS
[FreeTextEntry1] : 6/14/23: The patient is a 67-year-old female who presents for follow-up of her left foot base of fifth metatarsal fracture. She reports that she is feeling good with improved symptoms since her last visit. She has no pain. She has been compliant with physical therapy. She presents to the office in sneakers and ambulating without assistance. \par \par 5/4/2023: The patient is a 67 year old female presenting for a follow-up visit of her left foot, base of fifth metatarsal fracture. The patient states that her symptoms have improved since her previous visit. She presents today wearing a CAM boot and is ambulating without assistance. No other complaints. \par \par 3/29/2023: The patient is a 67 year old female who presents with her  for a follow-up visit of a left foot base of fifth metatarsal fracture. She states that her symptoms have improved since her previous visit. She still has some discomfort over the fracture sight.  She presents wearing a CAM boot and is ambulating without assistance. She is currently taking 81MG of Aspirin for DVT prophylaxis. The patient denies fevers, chills, night sweats, shortness of breath, calf pain.  No other complaints.\par \par 3/8/2023: The patient is a 67-year-old female who presents with a left foot base of fifth metatarsal fracture that she sustained 2 weeks ago while in the [facility] she accidentally rolled her foot suffering the fracture.  She presents with a short cam walking boot and states that the pain has subsided quite a bit since the initial injury.  She did fly back with the boot on from the [facility] approximately a 14-hour direct flight and she is on aspirin 81 mg daily.  The patient denies fevers, chills, night sweats, shortness of breath, calf pain.  No other complaints.

## 2023-06-14 NOTE — ADDENDUM
[FreeTextEntry1] : I, JONATHAN HEREDIA, acted solely as a scribe for Dr. Joey Hall on this date 06/14/2023  .\par  \par All medical record entries made by the Scribe were at my, Dr. Joey Hall, direction and personally dictated by me on 06/14/2023 . I have reviewed the chart and agree that the record accurately reflects my personal performance of the history, physical exam, assessment and plan. I have also personally directed, reviewed, and agreed with the chart.

## 2023-06-14 NOTE — PHYSICAL EXAM
[de-identified] : Left foot Physical Examination:\par \par General: Alert and oriented x3.  In no acute distress.  Pleasant in nature with a normal affect.  No apparent respiratory distress. \par Erythema, Warmth, Rubor: Negative\par Swelling: Mild swelling of the ankle.\par \par ROM Ankle:\par 1. Dorsiflexion: 10 degrees\par 2. Plantarflexion: 40 degrees\par 3. Inversion: 30 degrees\par 4. Eversion: 30 degrees\par \par ROM of digits: Normal\par \par Pes Planus: Negative\par Pes Cavus: Negative\par \par Bunion: Negative\par Molly's Bunion (Bunionette): Negative\par Hammer Toe Deformity/Deformities: Negative\par \par Tenderness to Palpation: \par 1. Heel Pain: Negative\par 2. Midfoot Pain: Negative\par 3. First MTP Joint: Negative\par 4. Lis Franc Joint: Negative\par \par Tenderness Metatarsals:\par 1st MT: Negative\par 2nd MT: Negative\par 3rd MT: Negative\par 4th MT: Negative\par 5th MT: Negative\par Base of the 5th MT: Positive\par \par Ligament Pain:\par 1. Lis Franc Ligament: Negative\par 2. Plantar Fascia Ligament: Negative\par \par Strength: \par 5/5 TA/GS/EHL/FHL/EDL/ADD/ABD\par \par Pulses: 2+ DP/PT Pulses\par \par Capillary Refill Toes: <2 seconds\par \par Neuro: Intact motor and sensory throughout\par \par Additional Test:\par 1. Cutler's Squeeze Test: Negative\par 2. Calcaneal Squeeze Test: Negative [de-identified] : 3V of the left foot were ordered obtained and reviewed by me today, 06/14/2023 , revealed: slightly displaced avulsion fracture at the base of the 5th metatarsal. \par

## 2023-06-14 NOTE — REASON FOR VISIT
[Follow-Up Visit] : a follow-up visit for [FreeTextEntry2] : Left foot base of fifth metatarsal fracture

## 2023-06-17 ENCOUNTER — OUTPATIENT (OUTPATIENT)
Dept: OUTPATIENT SERVICES | Facility: HOSPITAL | Age: 68
LOS: 1 days | End: 2023-06-17
Payer: COMMERCIAL

## 2023-06-17 ENCOUNTER — APPOINTMENT (OUTPATIENT)
Dept: RADIOLOGY | Facility: CLINIC | Age: 68
End: 2023-06-17

## 2023-06-17 DIAGNOSIS — Z98.891 HISTORY OF UTERINE SCAR FROM PREVIOUS SURGERY: Chronic | ICD-10-CM

## 2023-06-17 DIAGNOSIS — Z01.419 ENCOUNTER FOR GYNECOLOGICAL EXAMINATION (GENERAL) (ROUTINE) WITHOUT ABNORMAL FINDINGS: ICD-10-CM

## 2023-06-17 PROCEDURE — 77085 DXA BONE DENSITY AXL VRT FX: CPT | Mod: 26

## 2023-06-17 PROCEDURE — 77085 DXA BONE DENSITY AXL VRT FX: CPT

## 2023-06-20 ENCOUNTER — APPOINTMENT (OUTPATIENT)
Dept: GASTROENTEROLOGY | Facility: CLINIC | Age: 68
End: 2023-06-20
Payer: COMMERCIAL

## 2023-06-20 VITALS
HEART RATE: 77 BPM | HEIGHT: 63.5 IN | BODY MASS INDEX: 29.4 KG/M2 | DIASTOLIC BLOOD PRESSURE: 70 MMHG | WEIGHT: 168 LBS | SYSTOLIC BLOOD PRESSURE: 110 MMHG | OXYGEN SATURATION: 99 %

## 2023-06-20 PROCEDURE — 99204 OFFICE O/P NEW MOD 45 MIN: CPT

## 2023-06-20 NOTE — HISTORY OF PRESENT ILLNESS
[FreeTextEntry1] : 67-year-old female\par Last colonoscopy 7 years ago, single small adenoma removed\par No change in bowel habits since the last exam\par Still no family history of colon cancer\par Mild reflux complaints controlled with famotidine\par Denies dysphagia\par \par Denies history of coronary disease congestive heart failure or dysrhythmia\par \par Social history: , will be retiring soon

## 2023-06-20 NOTE — ASSESSMENT
[FreeTextEntry1] : 67-year-old female\par History of colon polyps due for surveillance\par Reflux without warning signs to suggest esophagitis\par \par Plan\par Indications risks benefits and alternatives to colonoscopy reviewed.  Patient agreeable to examination.\par \par Patient referred by Dr. Curtis Manning

## 2023-06-24 ENCOUNTER — APPOINTMENT (OUTPATIENT)
Dept: ULTRASOUND IMAGING | Facility: CLINIC | Age: 68
End: 2023-06-24
Payer: COMMERCIAL

## 2023-06-24 ENCOUNTER — APPOINTMENT (OUTPATIENT)
Dept: ULTRASOUND IMAGING | Facility: CLINIC | Age: 68
End: 2023-06-24

## 2023-06-24 ENCOUNTER — OUTPATIENT (OUTPATIENT)
Dept: OUTPATIENT SERVICES | Facility: HOSPITAL | Age: 68
LOS: 1 days | End: 2023-06-24
Payer: COMMERCIAL

## 2023-06-24 DIAGNOSIS — Z98.891 HISTORY OF UTERINE SCAR FROM PREVIOUS SURGERY: Chronic | ICD-10-CM

## 2023-06-24 DIAGNOSIS — Z01.419 ENCOUNTER FOR GYNECOLOGICAL EXAMINATION (GENERAL) (ROUTINE) WITHOUT ABNORMAL FINDINGS: ICD-10-CM

## 2023-06-24 PROCEDURE — 76856 US EXAM PELVIC COMPLETE: CPT | Mod: 26

## 2023-06-24 PROCEDURE — 76856 US EXAM PELVIC COMPLETE: CPT

## 2023-06-24 PROCEDURE — 76830 TRANSVAGINAL US NON-OB: CPT

## 2023-06-24 PROCEDURE — 76830 TRANSVAGINAL US NON-OB: CPT | Mod: 26

## 2023-06-29 DIAGNOSIS — N39.0 URINARY TRACT INFECTION, SITE NOT SPECIFIED: ICD-10-CM

## 2023-06-29 RX ORDER — NITROFURANTOIN MACROCRYSTALS 100 MG/1
100 CAPSULE ORAL
Qty: 10 | Refills: 0 | Status: COMPLETED | COMMUNITY
Start: 2023-06-29 | End: 1900-01-01

## 2023-06-30 ENCOUNTER — LABORATORY RESULT (OUTPATIENT)
Age: 68
End: 2023-06-30

## 2023-07-01 LAB
APPEARANCE: CLEAR
BILIRUBIN URINE: NEGATIVE
BLOOD URINE: ABNORMAL
COLOR: YELLOW
GLUCOSE QUALITATIVE U: NEGATIVE MG/DL
KETONES URINE: NEGATIVE MG/DL
LEUKOCYTE ESTERASE URINE: ABNORMAL
NITRITE URINE: NEGATIVE
PH URINE: 7
PROTEIN URINE: NEGATIVE MG/DL
SPECIFIC GRAVITY URINE: 1.01
UROBILINOGEN URINE: 0.2 MG/DL

## 2023-07-14 ENCOUNTER — TRANSCRIPTION ENCOUNTER (OUTPATIENT)
Age: 68
End: 2023-07-14

## 2023-07-24 ENCOUNTER — RX RENEWAL (OUTPATIENT)
Age: 68
End: 2023-07-24

## 2023-07-27 ENCOUNTER — APPOINTMENT (OUTPATIENT)
Dept: UROLOGY | Facility: CLINIC | Age: 68
End: 2023-07-27
Payer: COMMERCIAL

## 2023-07-27 ENCOUNTER — OUTPATIENT (OUTPATIENT)
Dept: OUTPATIENT SERVICES | Facility: HOSPITAL | Age: 68
LOS: 1 days | End: 2023-07-27
Payer: COMMERCIAL

## 2023-07-27 ENCOUNTER — APPOINTMENT (OUTPATIENT)
Dept: UROLOGY | Facility: CLINIC | Age: 68
End: 2023-07-27

## 2023-07-27 DIAGNOSIS — R35.0 FREQUENCY OF MICTURITION: ICD-10-CM

## 2023-07-27 DIAGNOSIS — D22.9 CONGENITAL NON-NEOPLASTIC NEVUS: ICD-10-CM

## 2023-07-27 DIAGNOSIS — R31.9 HEMATURIA, UNSPECIFIED: ICD-10-CM

## 2023-07-27 DIAGNOSIS — Q82.5 CONGENITAL NON-NEOPLASTIC NEVUS: ICD-10-CM

## 2023-07-27 DIAGNOSIS — Z98.891 HISTORY OF UTERINE SCAR FROM PREVIOUS SURGERY: Chronic | ICD-10-CM

## 2023-07-27 PROCEDURE — 88112 CYTOPATH CELL ENHANCE TECH: CPT | Mod: 26

## 2023-07-27 PROCEDURE — 76775 US EXAM ABDO BACK WALL LIM: CPT | Mod: 26

## 2023-07-27 PROCEDURE — 99214 OFFICE O/P EST MOD 30 MIN: CPT | Mod: 25

## 2023-07-27 PROCEDURE — 76775 US EXAM ABDO BACK WALL LIM: CPT

## 2023-07-28 DIAGNOSIS — D18.01 HEMANGIOMA OF SKIN AND SUBCUTANEOUS TISSUE: ICD-10-CM

## 2023-07-28 DIAGNOSIS — Q82.5 CONGENITAL NON-NEOPLASTIC NEVUS: ICD-10-CM

## 2023-07-28 DIAGNOSIS — M25.50 PAIN IN UNSPECIFIED JOINT: ICD-10-CM

## 2023-07-28 DIAGNOSIS — R31.9 HEMATURIA, UNSPECIFIED: ICD-10-CM

## 2023-07-31 ENCOUNTER — NON-APPOINTMENT (OUTPATIENT)
Age: 68
End: 2023-07-31

## 2023-08-01 LAB — URINE CYTOLOGY: NORMAL

## 2023-08-05 DIAGNOSIS — R31.21 ASYMPTOMATIC MICROSCOPIC HEMATURIA: ICD-10-CM

## 2023-08-05 LAB
APPEARANCE: ABNORMAL
BACTERIA: NEGATIVE /HPF
BILIRUBIN URINE: NEGATIVE
BLOOD URINE: ABNORMAL
CAST: 1 /LPF
COLOR: YELLOW
EPITHELIAL CELLS: 1 /HPF
GLUCOSE QUALITATIVE U: NEGATIVE MG/DL
KETONES URINE: NEGATIVE MG/DL
LEUKOCYTE ESTERASE URINE: NEGATIVE
MICROSCOPIC-UA: NORMAL
NITRITE URINE: NEGATIVE
PH URINE: 6.5
PROTEIN URINE: NORMAL MG/DL
RED BLOOD CELLS URINE: 36 /HPF
SPECIFIC GRAVITY URINE: 1.02
UROBILINOGEN URINE: 0.2 MG/DL
WHITE BLOOD CELLS URINE: 3 /HPF

## 2023-08-08 ENCOUNTER — APPOINTMENT (OUTPATIENT)
Dept: INTERNAL MEDICINE | Facility: CLINIC | Age: 68
End: 2023-08-08

## 2023-08-10 ENCOUNTER — APPOINTMENT (OUTPATIENT)
Dept: OBGYN | Facility: CLINIC | Age: 68
End: 2023-08-10
Payer: COMMERCIAL

## 2023-08-10 ENCOUNTER — ASOB RESULT (OUTPATIENT)
Age: 68
End: 2023-08-10

## 2023-08-10 PROCEDURE — ZZZZZ: CPT

## 2023-08-10 PROCEDURE — 58340 CATHETER FOR HYSTEROGRAPHY: CPT | Mod: 52

## 2023-08-10 PROCEDURE — 76831 ECHO EXAM UTERUS: CPT

## 2023-08-12 NOTE — HISTORY OF PRESENT ILLNESS
[FreeTextEntry1] : Follow-up hematuria.  Repeat urinalysis cytology.\par \par Follow-up kidney stone.  Ultrasound shows stable left kidney stone.  I encouraged patient to maintain a low-protein low-sodium diet. I also encouraged hydration to prevent stone formation. \par \par Please refer to URO Consult note

## 2023-08-12 NOTE — LETTER BODY
[FreeTextEntry1] : Curtis Manning  [facility] [address] [place] [address] [phone]  Dear Dr. Manning,  Reason for visit: Previous right ureteral stone. Left renal stone.  This is a 67 year-old woman with previous gross hematuria, previous right ureteral stone, and left renal stone. Her CT scan in Sept 2020 demonstrated a 3 mm nonobstructing left renal stone without evidence of a right ureteral stone or hydronephrosis. Her renal ultrasound in March 2021 demonstrated a stable small left renal stone. The patient returns today for follow-up and renal ultrasound. Since he was last seen, the patient notes she is recovering from COVID. She still has pulmonary issues. The patient denies any urinary complaints or difficulties. The patient denies flank pain or hematuria. She is entirely asymptomatic. The patient denies any changes in health. All other review of systems are negative. Past medical history, family history and social history were unchanged. Medications and allergies were reviewed. She has no known allergies to medication.   On examination, the patient is a healthy-appearing woman in no acute distress. She is alert and oriented follows commands. She has normal mood and affect. She is normocephalic. Neck is supple. Oral no thrush. Respirations are unlabored. Abdomen is soft and nontender. Bladder is nonpalpable. No CVA tenderness. Neurologically she is grossly intact. No peripheral edema. Skin without gross abnormality.  I personally reviewed ultrasound images with the patient today and images demonstrated stable left kidney stone.  Assessment: Previous gross hematuria. Left renal stone, resolved.   I counseled the patient. In terms of her left renal stone, her renal ultrasound today demonstrated stable left kidney stone. I reassured the patient. I encouraged the patient to maintain a low-protein low-sodium diet. I also encouraged hydration to prevent stone formation. In terms of her previous hematuria, I recommended she repeat urinalysis and urine cytology. Risks and alternatives were discussed. I answered the patient questions. The patient will follow-up as directed and will contact me with any questions or concerns. Thank you for the opportunity to participate in the care of Ms. CASIANO. I will keep you updated on her progress.  Plan: Urinalysis. Urine cytology. Stone diet. Hydration. Follow-up in 1 year.

## 2023-08-12 NOTE — ADDENDUM
[FreeTextEntry1] : Entered by Zeb Hansen, acting as scribe for Dr. Manuel Oleary.\par The documentation recorded by the scribe accurately reflects the service I personally performed and the decisions made by me.

## 2023-08-16 ENCOUNTER — APPOINTMENT (OUTPATIENT)
Dept: OBGYN | Facility: CLINIC | Age: 68
End: 2023-08-16
Payer: COMMERCIAL

## 2023-08-16 VITALS — DIASTOLIC BLOOD PRESSURE: 80 MMHG | SYSTOLIC BLOOD PRESSURE: 135 MMHG

## 2023-08-16 PROCEDURE — 99213 OFFICE O/P EST LOW 20 MIN: CPT

## 2023-08-18 NOTE — PRE PROCEDURE NOTE - PRE PROCEDURE EVALUATION
Attending Physician:        Juan C Bear MD                    Procedure:    Indication for Procedure: history of polyp  ________________________________________________________  PAST MEDICAL & SURGICAL HISTORY:  Hypertension      Hyperlipidemia      Vitamin D deficiency      Obstructive sleep apnea  with CPAP      S/P  section  x 2        ALLERGIES:  hydrocodone (Other)  Percocet 5/325 (Other)    HOME MEDICATIONS:  aspirin 81 mg oral delayed release tablet: 1 tab(s) orally once a day, stop 7 days before surgery  Crestor 20 mg oral tablet: 1 tab(s) orally once a day (at bedtime)  Flomax 0.4 mg oral capsule: 1 cap(s) orally once a day (at bedtime)  telmisartan 40 mg oral tablet: 1 tab(s) orally once a day  Vitamin D3 2000 intl units (50 mcg) oral capsule: orally once a day    AICD/PPM: [ ] yes   [ x] no    PERTINENT LAB DATA:                      PHYSICAL EXAMINATION:    T(C): --  HR: --  BP: --  RR: --  SpO2: --    Constitutional: NAD  HEENT: PERRLA, EOMI,    Neck:  No JVD  Respiratory: CTAB/L  Cardiovascular: S1 and S2  Gastrointestinal: BS+, soft, NT/ND  Extremities: No peripheral edema  Neurological: A/O x 3, no focal deficits  Psychiatric: Normal mood, normal affect  Skin: No rashes    ASA Class: I [ ]  II [x ]  III [ ]  IV [ ]    COMMENTS:    The patient is a suitable candidate for the planned procedure unless box checked [ ]  No, explain:

## 2023-08-19 ENCOUNTER — RX RENEWAL (OUTPATIENT)
Age: 68
End: 2023-08-19

## 2023-08-21 ENCOUNTER — TRANSCRIPTION ENCOUNTER (OUTPATIENT)
Age: 68
End: 2023-08-21

## 2023-08-21 ENCOUNTER — OUTPATIENT (OUTPATIENT)
Dept: OUTPATIENT SERVICES | Facility: HOSPITAL | Age: 68
LOS: 1 days | End: 2023-08-21
Payer: COMMERCIAL

## 2023-08-21 ENCOUNTER — APPOINTMENT (OUTPATIENT)
Dept: GASTROENTEROLOGY | Facility: HOSPITAL | Age: 68
End: 2023-08-21

## 2023-08-21 ENCOUNTER — RESULT REVIEW (OUTPATIENT)
Age: 68
End: 2023-08-21

## 2023-08-21 VITALS
RESPIRATION RATE: 20 BRPM | TEMPERATURE: 98 F | SYSTOLIC BLOOD PRESSURE: 129 MMHG | HEIGHT: 63 IN | OXYGEN SATURATION: 100 % | WEIGHT: 164.91 LBS | DIASTOLIC BLOOD PRESSURE: 93 MMHG | HEART RATE: 65 BPM

## 2023-08-21 VITALS
RESPIRATION RATE: 14 BRPM | SYSTOLIC BLOOD PRESSURE: 129 MMHG | HEART RATE: 74 BPM | OXYGEN SATURATION: 100 % | DIASTOLIC BLOOD PRESSURE: 64 MMHG

## 2023-08-21 DIAGNOSIS — Z86.010 PERSONAL HISTORY OF COLONIC POLYPS: ICD-10-CM

## 2023-08-21 DIAGNOSIS — Z98.891 HISTORY OF UTERINE SCAR FROM PREVIOUS SURGERY: Chronic | ICD-10-CM

## 2023-08-21 PROCEDURE — 45385 COLONOSCOPY W/LESION REMOVAL: CPT | Mod: PT

## 2023-08-21 PROCEDURE — 88305 TISSUE EXAM BY PATHOLOGIST: CPT

## 2023-08-21 PROCEDURE — 88305 TISSUE EXAM BY PATHOLOGIST: CPT | Mod: 26

## 2023-08-21 PROCEDURE — 45385 COLONOSCOPY W/LESION REMOVAL: CPT

## 2023-08-21 DEVICE — NET RETRV ROT ROTH 2.5MMX230CM: Type: IMPLANTABLE DEVICE | Status: FUNCTIONAL

## 2023-08-21 RX ORDER — SODIUM CHLORIDE 9 MG/ML
500 INJECTION INTRAMUSCULAR; INTRAVENOUS; SUBCUTANEOUS
Refills: 0 | Status: COMPLETED | OUTPATIENT
Start: 2023-08-21 | End: 2023-08-21

## 2023-08-21 RX ADMIN — SODIUM CHLORIDE 30 MILLILITER(S): 9 INJECTION INTRAMUSCULAR; INTRAVENOUS; SUBCUTANEOUS at 10:37

## 2023-08-21 NOTE — ASU PATIENT PROFILE, ADULT - FALL HARM RISK - RISK INTERVENTIONS

## 2023-08-21 NOTE — ASU DISCHARGE PLAN (ADULT/PEDIATRIC) - C. MAKE IMPORTANT PERSONAL OR BUSINESS DECISIONS
----- Message from ALKA Brown sent at 2/25/2021  1:03 PM CST -----  Advise patient his blood counts continue to improve.  He should continue to monitor for any signs of GI bleeding.   Statement Selected

## 2023-08-21 NOTE — ASU PATIENT PROFILE, ADULT - NS TRANSFER DISPOSITION PATIENT BELONGINGS
Price (Do Not Change): 0.00 Detail Level: Simple Instructions: This plan will send the code FBSE to the PM system.  DO NOT or CHANGE the price. with patient

## 2023-08-21 NOTE — ASU PATIENT PROFILE, ADULT - NSICDXPASTMEDICALHX_GEN_ALL_CORE_FT
PAST MEDICAL HISTORY:  Asthma     Hyperlipidemia     Hypertension     Obstructive sleep apnea with CPAP    Pre-existing type 2 diabetes mellitus     Vitamin D deficiency

## 2023-08-22 PROBLEM — J45.909 UNSPECIFIED ASTHMA, UNCOMPLICATED: Chronic | Status: ACTIVE | Noted: 2023-08-21

## 2023-08-23 DIAGNOSIS — N88.2 STRICTURE AND STENOSIS OF CERVIX UTERI: ICD-10-CM

## 2023-08-23 PROBLEM — E11.9 TYPE 2 DIABETES MELLITUS WITHOUT COMPLICATIONS: Chronic | Status: ACTIVE | Noted: 2023-08-21

## 2023-08-24 ENCOUNTER — APPOINTMENT (OUTPATIENT)
Dept: INTERNAL MEDICINE | Facility: CLINIC | Age: 68
End: 2023-08-24

## 2023-08-29 ENCOUNTER — OUTPATIENT (OUTPATIENT)
Dept: OUTPATIENT SERVICES | Facility: HOSPITAL | Age: 68
LOS: 1 days | End: 2023-08-29
Payer: COMMERCIAL

## 2023-08-29 ENCOUNTER — APPOINTMENT (OUTPATIENT)
Dept: INTERNAL MEDICINE | Facility: CLINIC | Age: 68
End: 2023-08-29
Payer: COMMERCIAL

## 2023-08-29 ENCOUNTER — NON-APPOINTMENT (OUTPATIENT)
Age: 68
End: 2023-08-29

## 2023-08-29 VITALS
WEIGHT: 168 LBS | DIASTOLIC BLOOD PRESSURE: 84 MMHG | HEART RATE: 74 BPM | BODY MASS INDEX: 29.29 KG/M2 | RESPIRATION RATE: 14 BRPM | SYSTOLIC BLOOD PRESSURE: 118 MMHG

## 2023-08-29 DIAGNOSIS — Z98.891 HISTORY OF UTERINE SCAR FROM PREVIOUS SURGERY: Chronic | ICD-10-CM

## 2023-08-29 DIAGNOSIS — I10 ESSENTIAL (PRIMARY) HYPERTENSION: ICD-10-CM

## 2023-08-29 LAB — SURGICAL PATHOLOGY STUDY: SIGNIFICANT CHANGE UP

## 2023-08-29 PROCEDURE — 99214 OFFICE O/P EST MOD 30 MIN: CPT

## 2023-08-29 PROCEDURE — G0463: CPT

## 2023-08-29 NOTE — ASSESSMENT
[FreeTextEntry1] : A 67 year female scheduled for hysteroscopy.  She is an acceptable risk to proceed with the planned procedure.  Her  chronic diseases are medically optimized.  She  has no history of coronary artery disease.   Her  functional capacity is > 4 METS. The estimated risk of cardiac death,nonfatal MI or cardiac arrest based on the ACS operative risk core is approximately 0.1%. No additional cardiac testing is indicated at this time.   Risk factors for postoperative pulmonary complications include age, COLLINS and long COVID. Her ARISCAT pulmonary risk score is 3, which is low.   Recommend early mobilization and good oral hygiene.  No additional pulmonary testing is indicated.  Her diabetes is well controlled, with the most recent Hgb A1c being 5.8%.  She  was advised to hold the metformin the day of the surgery.  Her pre-surgical tests are not yet available.  Will review upon receipt.  34 minutes spent in preparation of this visit, including review of previous notes and test results, interview and examination of patient, discussion of plan, arranging for appropriate testing and treatment, and documentation.

## 2023-08-29 NOTE — HISTORY OF PRESENT ILLNESS
[Diabetes] : diabetes [Aortic Stenosis] : no aortic stenosis [Atrial Fibrillation] : no atrial fibrillation [Coronary Artery Disease] : no coronary artery disease [Recent Myocardial Infarction] : no recent myocardial infarction [Implantable Device/Pacemaker] : no implantable device/pacemaker [Asthma] : no asthma [COPD] : no COPD [Sleep Apnea] : no sleep apnea [Smoker] : not a smoker [Self] : no previous adverse anesthesia reaction [Chronic Anticoagulation] : no chronic anticoagulation [Chronic Kidney Disease] : no chronic kidney disease [FreeTextEntry1] : Hysteroscopy  [FreeTextEntry2] : 9/15/23 [FreeTextEntry3] : Dr. Hairston [FreeTextEntry4] : Hss baseline SOB due to prior COVID infection [FreeTextEntry6] : Denies headache, dizziness. Denies cough, wheezing. Denies CP, , , edema, palpitations. Denies abdominal pain, N/V/D/C. Denies BRBPR, melena, dysphagia. Denies dysuria, frequency, hematuria, hesitancy. No bleeding or bruising tendencies. [FreeTextEntry8] : [facility] Activity Status Index predicts a MET of 5

## 2023-08-30 ENCOUNTER — OUTPATIENT (OUTPATIENT)
Dept: OUTPATIENT SERVICES | Facility: HOSPITAL | Age: 68
LOS: 1 days | End: 2023-08-30
Payer: COMMERCIAL

## 2023-08-30 VITALS
OXYGEN SATURATION: 98 % | SYSTOLIC BLOOD PRESSURE: 128 MMHG | DIASTOLIC BLOOD PRESSURE: 86 MMHG | HEART RATE: 60 BPM | WEIGHT: 164.91 LBS | RESPIRATION RATE: 14 BRPM | HEIGHT: 63 IN | TEMPERATURE: 98 F

## 2023-08-30 DIAGNOSIS — N20.0 CALCULUS OF KIDNEY: Chronic | ICD-10-CM

## 2023-08-30 DIAGNOSIS — Z98.891 HISTORY OF UTERINE SCAR FROM PREVIOUS SURGERY: Chronic | ICD-10-CM

## 2023-08-30 DIAGNOSIS — Z01.818 ENCOUNTER FOR OTHER PREPROCEDURAL EXAMINATION: ICD-10-CM

## 2023-08-30 DIAGNOSIS — N84.0 POLYP OF CORPUS UTERI: ICD-10-CM

## 2023-08-30 LAB
A1C WITH ESTIMATED AVERAGE GLUCOSE RESULT: 5.9 % — HIGH (ref 4–5.6)
ANION GAP SERPL CALC-SCNC: 12 MMOL/L — SIGNIFICANT CHANGE UP (ref 5–17)
BLD GP AB SCN SERPL QL: NEGATIVE — SIGNIFICANT CHANGE UP
BUN SERPL-MCNC: 12 MG/DL — SIGNIFICANT CHANGE UP (ref 7–23)
CALCIUM SERPL-MCNC: 9.7 MG/DL — SIGNIFICANT CHANGE UP (ref 8.4–10.5)
CHLORIDE SERPL-SCNC: 104 MMOL/L — SIGNIFICANT CHANGE UP (ref 96–108)
CO2 SERPL-SCNC: 25 MMOL/L — SIGNIFICANT CHANGE UP (ref 22–31)
CREAT SERPL-MCNC: 0.57 MG/DL — SIGNIFICANT CHANGE UP (ref 0.5–1.3)
EGFR: 100 ML/MIN/1.73M2 — SIGNIFICANT CHANGE UP
ESTIMATED AVERAGE GLUCOSE: 123 MG/DL — HIGH (ref 68–114)
GLUCOSE SERPL-MCNC: 98 MG/DL — SIGNIFICANT CHANGE UP (ref 70–99)
HCT VFR BLD CALC: 43.3 % — SIGNIFICANT CHANGE UP (ref 34.5–45)
HGB BLD-MCNC: 14 G/DL — SIGNIFICANT CHANGE UP (ref 11.5–15.5)
MCHC RBC-ENTMCNC: 29.7 PG — SIGNIFICANT CHANGE UP (ref 27–34)
MCHC RBC-ENTMCNC: 32.3 GM/DL — SIGNIFICANT CHANGE UP (ref 32–36)
MCV RBC AUTO: 91.7 FL — SIGNIFICANT CHANGE UP (ref 80–100)
NRBC # BLD: 0 /100 WBCS — SIGNIFICANT CHANGE UP (ref 0–0)
PLATELET # BLD AUTO: 231 K/UL — SIGNIFICANT CHANGE UP (ref 150–400)
POTASSIUM SERPL-MCNC: 4.2 MMOL/L — SIGNIFICANT CHANGE UP (ref 3.5–5.3)
POTASSIUM SERPL-SCNC: 4.2 MMOL/L — SIGNIFICANT CHANGE UP (ref 3.5–5.3)
RBC # BLD: 4.72 M/UL — SIGNIFICANT CHANGE UP (ref 3.8–5.2)
RBC # FLD: 12.1 % — SIGNIFICANT CHANGE UP (ref 10.3–14.5)
RH IG SCN BLD-IMP: POSITIVE — SIGNIFICANT CHANGE UP
SODIUM SERPL-SCNC: 141 MMOL/L — SIGNIFICANT CHANGE UP (ref 135–145)
WBC # BLD: 5.32 K/UL — SIGNIFICANT CHANGE UP (ref 3.8–10.5)
WBC # FLD AUTO: 5.32 K/UL — SIGNIFICANT CHANGE UP (ref 3.8–10.5)

## 2023-08-30 PROCEDURE — 86900 BLOOD TYPING SEROLOGIC ABO: CPT

## 2023-08-30 PROCEDURE — 86901 BLOOD TYPING SEROLOGIC RH(D): CPT

## 2023-08-30 PROCEDURE — 85027 COMPLETE CBC AUTOMATED: CPT

## 2023-08-30 PROCEDURE — 86850 RBC ANTIBODY SCREEN: CPT

## 2023-08-30 PROCEDURE — 83036 HEMOGLOBIN GLYCOSYLATED A1C: CPT

## 2023-08-30 PROCEDURE — G0463: CPT

## 2023-08-30 PROCEDURE — 80048 BASIC METABOLIC PNL TOTAL CA: CPT

## 2023-08-30 RX ORDER — SODIUM CHLORIDE 9 MG/ML
1000 INJECTION, SOLUTION INTRAVENOUS
Refills: 0 | Status: DISCONTINUED | OUTPATIENT
Start: 2023-09-15 | End: 2023-09-29

## 2023-08-30 RX ORDER — DESLORATADINE 5 MG/1
1 TABLET, FILM COATED ORAL
Refills: 0 | DISCHARGE

## 2023-08-30 RX ORDER — SODIUM CHLORIDE 9 MG/ML
3 INJECTION INTRAMUSCULAR; INTRAVENOUS; SUBCUTANEOUS EVERY 8 HOURS
Refills: 0 | Status: DISCONTINUED | OUTPATIENT
Start: 2023-09-15 | End: 2023-09-29

## 2023-08-30 RX ORDER — TAMSULOSIN HYDROCHLORIDE 0.4 MG/1
1 CAPSULE ORAL
Qty: 0 | Refills: 0 | DISCHARGE

## 2023-08-30 RX ORDER — CHOLECALCIFEROL (VITAMIN D3) 125 MCG
0 CAPSULE ORAL
Qty: 0 | Refills: 0 | DISCHARGE

## 2023-08-30 NOTE — H&P PST ADULT - PROBLEM SELECTOR PLAN 1
-Scheduled for D&C; Operative hysteroscopy with AVETA; possible polypectomy; ultrasound guidance on 9/15/23 with Shelli Mathews  -CBC, BMP, T&S today in PST  -PCP clearance 8/29/23  -Preop instructions provided; Patient stated understanding using teach back method; A copy of written instructions also provided   -Adequate time provided for questions and answers   -Advised by PCP to hold aspirin 1 week prior to surgery  -Advised not to take metformin morning of surgery (last dose: 9/14/23)  -Advised to take telmisartan and inhalers am DOS; also to bring inhalers with her DOS  -Preop orders entered per protocol DOS

## 2023-08-30 NOTE — H&P PST ADULT - FUNCTIONAL STATUS
Mets 6- able to walk 1 miles; up 1-2 flights of stairs; moderate physical activity at home- denies CP, SOB, and palpitations/4-10 METS

## 2023-08-30 NOTE — H&P PST ADULT - NSICDXPASTMEDICALHX_GEN_ALL_CORE_FT
PAST MEDICAL HISTORY:  Asthma     Hyperlipidemia     Hypertension     Long COVID     Obstructive sleep apnea with CPAP    Pre-existing type 2 diabetes mellitus     Renal calculi     Vitamin D deficiency

## 2023-08-30 NOTE — H&P PST ADULT - NS HP PST LATEX ALLERGY
No
good air movement/clear to auscultation bilaterally/respirations non-labored/breath sounds equal/airway patent

## 2023-08-30 NOTE — H&P PST ADULT - MUSCULOSKELETAL
normal/ROM intact/no joint swelling/no joint erythema/no joint warmth/normal gait/strength 5/5 bilateral upper extremities/strength 5/5 bilateral lower extremities negative

## 2023-09-08 ENCOUNTER — APPOINTMENT (OUTPATIENT)
Dept: INTERNAL MEDICINE | Facility: CLINIC | Age: 68
End: 2023-09-08

## 2023-09-11 ENCOUNTER — APPOINTMENT (OUTPATIENT)
Dept: ORTHOPEDIC SURGERY | Facility: CLINIC | Age: 68
End: 2023-09-11
Payer: COMMERCIAL

## 2023-09-11 DIAGNOSIS — M25.571 PAIN IN RIGHT ANKLE AND JOINTS OF RIGHT FOOT: ICD-10-CM

## 2023-09-11 PROBLEM — N20.0 CALCULUS OF KIDNEY: Chronic | Status: ACTIVE | Noted: 2023-08-30

## 2023-09-11 PROBLEM — U09.9 POST COVID-19 CONDITION, UNSPECIFIED: Chronic | Status: ACTIVE | Noted: 2023-08-30

## 2023-09-11 PROCEDURE — 73630 X-RAY EXAM OF FOOT: CPT | Mod: LT

## 2023-09-11 PROCEDURE — 99213 OFFICE O/P EST LOW 20 MIN: CPT

## 2023-09-13 ENCOUNTER — APPOINTMENT (OUTPATIENT)
Dept: PULMONOLOGY | Facility: CLINIC | Age: 68
End: 2023-09-13
Payer: COMMERCIAL

## 2023-09-13 VITALS
BODY MASS INDEX: 28.7 KG/M2 | HEIGHT: 63.5 IN | HEART RATE: 77 BPM | WEIGHT: 164 LBS | DIASTOLIC BLOOD PRESSURE: 64 MMHG | TEMPERATURE: 97.3 F | OXYGEN SATURATION: 97 % | RESPIRATION RATE: 18 BRPM | SYSTOLIC BLOOD PRESSURE: 110 MMHG

## 2023-09-13 DIAGNOSIS — R93.89 ABNORMAL FINDINGS ON DIAGNOSTIC IMAGING OF OTHER SPECIFIED BODY STRUCTURES: ICD-10-CM

## 2023-09-13 DIAGNOSIS — J30.9 ALLERGIC RHINITIS, UNSPECIFIED: ICD-10-CM

## 2023-09-13 DIAGNOSIS — K21.9 GASTRO-ESOPHAGEAL REFLUX DISEASE W/OUT ESOPHAGITIS: ICD-10-CM

## 2023-09-13 DIAGNOSIS — R06.02 SHORTNESS OF BREATH: ICD-10-CM

## 2023-09-13 DIAGNOSIS — F41.9 ANXIETY DISORDER, UNSPECIFIED: ICD-10-CM

## 2023-09-13 DIAGNOSIS — R09.82 POSTNASAL DRIP: ICD-10-CM

## 2023-09-13 DIAGNOSIS — L50.0 ALLERGIC URTICARIA: ICD-10-CM

## 2023-09-13 PROCEDURE — 94010 BREATHING CAPACITY TEST: CPT

## 2023-09-13 PROCEDURE — 94727 GAS DIL/WSHOT DETER LNG VOL: CPT

## 2023-09-13 PROCEDURE — 95012 NITRIC OXIDE EXP GAS DETER: CPT

## 2023-09-13 PROCEDURE — 99214 OFFICE O/P EST MOD 30 MIN: CPT | Mod: 25

## 2023-09-13 PROCEDURE — 94729 DIFFUSING CAPACITY: CPT

## 2023-09-14 ENCOUNTER — TRANSCRIPTION ENCOUNTER (OUTPATIENT)
Age: 68
End: 2023-09-14

## 2023-09-14 NOTE — ED ADULT NURSE NOTE - OBJECTIVE STATEMENT
Ambulatory, well-appearing patient in no apparent distress complains of shortness of breath and lightheadedness.  Pt reports 14 days of covid-19 infection in Jan 2021, has followed with pulm since then for "possible lung scarring," was never hospitalized for covid or required O2.  States she always has shortness of breath but has now been worse for 2 weeks, today she is having lightheadedness so presents to ER.  Hx sleep apnea on cpap, reports she is not having the labored breathing at night and has had no issues sleeping.  Pt is conversive, breathing is normal with frequent deeper breaths, abdomen soft/non-distended/non-tender, skin warm, dry, intact denies chest pain, syncope, cough, abdominal pain, nausea, vomiting, diarrhea, fevers, chills, urinary symptoms, sick contacts. You can access the FollowMyHealth Patient Portal offered by [facility] by registering at the following website: [email or web address]. By joining [employer]’s FollowMyHealth portal, you will also be able to view your health information using other applications (apps) compatible with our system.

## 2023-09-15 ENCOUNTER — OUTPATIENT (OUTPATIENT)
Dept: OUTPATIENT SERVICES | Facility: HOSPITAL | Age: 68
LOS: 1 days | End: 2023-09-15
Payer: COMMERCIAL

## 2023-09-15 ENCOUNTER — APPOINTMENT (OUTPATIENT)
Dept: OBGYN | Facility: CLINIC | Age: 68
End: 2023-09-15
Payer: COMMERCIAL

## 2023-09-15 ENCOUNTER — APPOINTMENT (OUTPATIENT)
Dept: OBGYN | Facility: HOSPITAL | Age: 68
End: 2023-09-15
Payer: COMMERCIAL

## 2023-09-15 ENCOUNTER — TRANSCRIPTION ENCOUNTER (OUTPATIENT)
Age: 68
End: 2023-09-15

## 2023-09-15 ENCOUNTER — APPOINTMENT (OUTPATIENT)
Dept: ULTRASOUND IMAGING | Facility: HOSPITAL | Age: 68
End: 2023-09-15

## 2023-09-15 ENCOUNTER — RESULT REVIEW (OUTPATIENT)
Age: 68
End: 2023-09-15

## 2023-09-15 VITALS
HEART RATE: 91 BPM | HEIGHT: 63 IN | WEIGHT: 164.91 LBS | OXYGEN SATURATION: 99 % | SYSTOLIC BLOOD PRESSURE: 144 MMHG | DIASTOLIC BLOOD PRESSURE: 81 MMHG | RESPIRATION RATE: 16 BRPM | TEMPERATURE: 97 F

## 2023-09-15 VITALS
SYSTOLIC BLOOD PRESSURE: 118 MMHG | OXYGEN SATURATION: 99 % | HEART RATE: 75 BPM | RESPIRATION RATE: 16 BRPM | DIASTOLIC BLOOD PRESSURE: 60 MMHG

## 2023-09-15 DIAGNOSIS — N84.0 POLYP OF CORPUS UTERI: ICD-10-CM

## 2023-09-15 DIAGNOSIS — Z98.891 HISTORY OF UTERINE SCAR FROM PREVIOUS SURGERY: Chronic | ICD-10-CM

## 2023-09-15 LAB
GLUCOSE BLDC GLUCOMTR-MCNC: 97 MG/DL — SIGNIFICANT CHANGE UP (ref 70–99)
RH IG SCN BLD-IMP: POSITIVE — SIGNIFICANT CHANGE UP

## 2023-09-15 PROCEDURE — C1782: CPT

## 2023-09-15 PROCEDURE — 76998 US GUIDE INTRAOP: CPT

## 2023-09-15 PROCEDURE — 88305 TISSUE EXAM BY PATHOLOGIST: CPT | Mod: 26

## 2023-09-15 PROCEDURE — 88360 TUMOR IMMUNOHISTOCHEM/MANUAL: CPT

## 2023-09-15 PROCEDURE — 88341 IMHCHEM/IMCYTCHM EA ADD ANTB: CPT | Mod: 26

## 2023-09-15 PROCEDURE — 88305 TISSUE EXAM BY PATHOLOGIST: CPT

## 2023-09-15 PROCEDURE — ZZZZZ: CPT

## 2023-09-15 PROCEDURE — 82962 GLUCOSE BLOOD TEST: CPT

## 2023-09-15 PROCEDURE — 88342 IMHCHEM/IMCYTCHM 1ST ANTB: CPT | Mod: 26

## 2023-09-15 PROCEDURE — 58558 HYSTEROSCOPY BIOPSY: CPT

## 2023-09-15 PROCEDURE — 88341 IMHCHEM/IMCYTCHM EA ADD ANTB: CPT

## 2023-09-15 PROCEDURE — 58120 DILATION AND CURETTAGE: CPT | Mod: NC

## 2023-09-15 DEVICE — AVETA SMOL RESECTING DEVICE 2.9MM: Type: IMPLANTABLE DEVICE | Status: FUNCTIONAL

## 2023-09-15 RX ORDER — LIDOCAINE HCL 20 MG/ML
0.2 VIAL (ML) INJECTION ONCE
Refills: 0 | Status: COMPLETED | OUTPATIENT
Start: 2023-09-15 | End: 2023-09-15

## 2023-09-15 RX ORDER — ERGOCALCIFEROL 1.25 MG/1
1 CAPSULE ORAL
Refills: 0 | DISCHARGE

## 2023-09-15 RX ADMIN — SODIUM CHLORIDE 3 MILLILITER(S): 9 INJECTION INTRAMUSCULAR; INTRAVENOUS; SUBCUTANEOUS at 12:59

## 2023-09-15 RX ADMIN — SODIUM CHLORIDE 100 MILLILITER(S): 9 INJECTION, SOLUTION INTRAVENOUS at 12:55

## 2023-09-15 NOTE — BRIEF OPERATIVE NOTE - NSICDXBRIEFPOSTOP_GEN_ALL_CORE_FT
Patient refused ultrasound ordered by Dr. Brandi Lomax. This nurse attempted to call Dr. Brandi Lomax. When this nurse called Dr. Brandi Lomax, the call went straight to voicemail. Dr. Yokasta Krishnan voicemail box was full so this nurse was unable to leave a message.  Electronically signed by Santo Musa RN on 12/1/2021 POST-OP DIAGNOSIS:  Endometrial polyp 15-Sep-2023 15:46:37  Shelli Hairston

## 2023-09-15 NOTE — BRIEF OPERATIVE NOTE - NSICDXBRIEFPROCEDURE_GEN_ALL_CORE_FT
PROCEDURES:  Exam under anesthesia, pelvic 15-Sep-2023 15:44:44  Shelli Hairston  Dilation and curettage with polypectomy 15-Sep-2023 15:44:57  Shelli Hairston  Hysteroscopy with biopsy or polypectomy 15-Sep-2023 15:45:08  Shelli Hairston

## 2023-09-15 NOTE — PRE-ANESTHESIA EVALUATION ADULT - TEMPERATURE IN CELSIUS (DEGREES C)
Problem: Patient Care Overview (Adult)  Goal: Plan of Care Review  Outcome: Ongoing (interventions implemented as appropriate)    08/04/17 0330   Coping/Psychosocial Response Interventions   Plan Of Care Reviewed With patient   Patient Care Overview   Progress improving   Outcome Evaluation   Outcome Summary/Follow up Plan VSS. Pain controlled with PO pain med. Ambulates with assist x 1 and walker. Voiding without difficulty. Plans to d/c to rehab.          Problem: Perioperative Period (Adult)  Goal: Signs and Symptoms of Listed Potential Problems Will be Absent or Manageable (Perioperative Period)  Outcome: Ongoing (interventions implemented as appropriate)    Problem: Knee Replacement, Total (Adult)  Goal: Signs and Symptoms of Listed Potential Problems Will be Absent or Manageable (Knee Replacement, Total)  Outcome: Ongoing (interventions implemented as appropriate)    Problem: Fall Risk (Adult)  Goal: Absence of Falls  Outcome: Ongoing (interventions implemented as appropriate)       36.3

## 2023-09-15 NOTE — ASU PATIENT PROFILE, ADULT - FALL HARM RISK - UNIVERSAL INTERVENTIONS
Bed in lowest position, wheels locked, appropriate side rails in place/Call bell, personal items and telephone in reach/Instruct patient to call for assistance before getting out of bed or chair/Non-slip footwear when patient is out of bed/[place] to call system/Physically safe environment - no spills, clutter or unnecessary equipment/Purposeful Proactive Rounding/Room/bathroom lighting operational, light cord in reach

## 2023-09-15 NOTE — ASU DISCHARGE PLAN (ADULT/PEDIATRIC) - NURSING INSTRUCTIONS
OK to take Tylenol/Acetaminophen at 9:00 PM TONIGHT for pain and every 6 hours after as needed. OK to take Motrin/Ibuprofen at 9:30 PM TONIGHT for pain and every 6 hours after as needed.

## 2023-09-18 ENCOUNTER — OUTPATIENT (OUTPATIENT)
Dept: OUTPATIENT SERVICES | Facility: HOSPITAL | Age: 68
LOS: 1 days | End: 2023-09-18

## 2023-09-18 ENCOUNTER — APPOINTMENT (OUTPATIENT)
Dept: INTERNAL MEDICINE | Facility: CLINIC | Age: 68
End: 2023-09-18
Payer: COMMERCIAL

## 2023-09-18 DIAGNOSIS — I10 ESSENTIAL (PRIMARY) HYPERTENSION: ICD-10-CM

## 2023-09-18 DIAGNOSIS — Z98.891 HISTORY OF UTERINE SCAR FROM PREVIOUS SURGERY: Chronic | ICD-10-CM

## 2023-09-18 PROCEDURE — 97803 MED NUTRITION INDIV SUBSEQ: CPT

## 2023-09-19 ENCOUNTER — APPOINTMENT (OUTPATIENT)
Dept: ORTHOPEDIC SURGERY | Facility: CLINIC | Age: 68
End: 2023-09-19
Payer: COMMERCIAL

## 2023-09-19 ENCOUNTER — APPOINTMENT (OUTPATIENT)
Dept: DERMATOLOGY | Facility: CLINIC | Age: 68
End: 2023-09-19
Payer: COMMERCIAL

## 2023-09-19 DIAGNOSIS — M75.82 OTHER SHOULDER LESIONS, LEFT SHOULDER: ICD-10-CM

## 2023-09-19 DIAGNOSIS — L21.9 SEBORRHEIC DERMATITIS, UNSPECIFIED: ICD-10-CM

## 2023-09-19 PROCEDURE — 73030 X-RAY EXAM OF SHOULDER: CPT | Mod: 50

## 2023-09-19 PROCEDURE — 20611 DRAIN/INJ JOINT/BURSA W/US: CPT | Mod: RT

## 2023-09-19 PROCEDURE — 99214 OFFICE O/P EST MOD 30 MIN: CPT | Mod: 25

## 2023-09-19 PROCEDURE — 99214 OFFICE O/P EST MOD 30 MIN: CPT

## 2023-09-19 RX ORDER — CLINDAMYCIN PHOSPHATE 10 MG/ML
1 SOLUTION TOPICAL
Qty: 1 | Refills: 6 | Status: ACTIVE | COMMUNITY
Start: 2023-09-19 | End: 1900-01-01

## 2023-09-19 RX ORDER — MOMETASONE FUROATE 1 MG/ML
0.1 SOLUTION TOPICAL
Qty: 1 | Refills: 3 | Status: ACTIVE | COMMUNITY
Start: 2023-09-19 | End: 1900-01-01

## 2023-09-27 ENCOUNTER — APPOINTMENT (OUTPATIENT)
Dept: OBGYN | Facility: CLINIC | Age: 68
End: 2023-09-27
Payer: COMMERCIAL

## 2023-09-27 VITALS — SYSTOLIC BLOOD PRESSURE: 131 MMHG | DIASTOLIC BLOOD PRESSURE: 77 MMHG

## 2023-09-27 LAB — SURGICAL PATHOLOGY STUDY: SIGNIFICANT CHANGE UP

## 2023-09-27 PROCEDURE — 99214 OFFICE O/P EST MOD 30 MIN: CPT

## 2023-10-03 ENCOUNTER — NON-APPOINTMENT (OUTPATIENT)
Age: 68
End: 2023-10-03

## 2023-10-04 ENCOUNTER — NON-APPOINTMENT (OUTPATIENT)
Age: 68
End: 2023-10-04

## 2023-10-13 ENCOUNTER — RX RENEWAL (OUTPATIENT)
Age: 68
End: 2023-10-13

## 2023-10-13 ENCOUNTER — NON-APPOINTMENT (OUTPATIENT)
Age: 68
End: 2023-10-13

## 2023-10-13 RX ORDER — METFORMIN HYDROCHLORIDE 500 MG/1
500 TABLET, COATED ORAL DAILY
Qty: 90 | Refills: 5 | Status: ACTIVE | COMMUNITY
Start: 2022-12-04 | End: 1900-01-01

## 2023-10-17 ENCOUNTER — NON-APPOINTMENT (OUTPATIENT)
Age: 68
End: 2023-10-17

## 2023-11-01 ENCOUNTER — NON-APPOINTMENT (OUTPATIENT)
Age: 68
End: 2023-11-01

## 2023-11-01 DIAGNOSIS — Z86.010 PERSONAL HISTORY OF COLONIC POLYPS: ICD-10-CM

## 2023-11-08 ENCOUNTER — NON-APPOINTMENT (OUTPATIENT)
Age: 68
End: 2023-11-08

## 2023-11-08 ENCOUNTER — APPOINTMENT (OUTPATIENT)
Dept: CARDIOLOGY | Facility: CLINIC | Age: 68
End: 2023-11-08
Payer: COMMERCIAL

## 2023-11-08 VITALS
WEIGHT: 164 LBS | BODY MASS INDEX: 28.6 KG/M2 | SYSTOLIC BLOOD PRESSURE: 138 MMHG | OXYGEN SATURATION: 98 % | DIASTOLIC BLOOD PRESSURE: 80 MMHG | HEART RATE: 64 BPM

## 2023-11-08 PROCEDURE — 99214 OFFICE O/P EST MOD 30 MIN: CPT | Mod: 25

## 2023-11-08 PROCEDURE — 93000 ELECTROCARDIOGRAM COMPLETE: CPT

## 2023-11-09 ENCOUNTER — APPOINTMENT (OUTPATIENT)
Dept: OBGYN | Facility: CLINIC | Age: 68
End: 2023-11-09

## 2023-11-10 ENCOUNTER — APPOINTMENT (OUTPATIENT)
Dept: GYNECOLOGIC ONCOLOGY | Facility: CLINIC | Age: 68
End: 2023-11-10
Payer: COMMERCIAL

## 2023-11-10 VITALS
BODY MASS INDEX: 28 KG/M2 | WEIGHT: 164 LBS | SYSTOLIC BLOOD PRESSURE: 147 MMHG | HEIGHT: 64 IN | DIASTOLIC BLOOD PRESSURE: 82 MMHG | HEART RATE: 72 BPM

## 2023-11-10 DIAGNOSIS — G47.33 OBSTRUCTIVE SLEEP APNEA (ADULT) (PEDIATRIC): ICD-10-CM

## 2023-11-10 PROCEDURE — 99205 OFFICE O/P NEW HI 60 MIN: CPT

## 2023-11-20 ENCOUNTER — OUTPATIENT (OUTPATIENT)
Dept: OUTPATIENT SERVICES | Facility: HOSPITAL | Age: 68
LOS: 1 days | End: 2023-11-20
Payer: COMMERCIAL

## 2023-11-20 ENCOUNTER — RESULT REVIEW (OUTPATIENT)
Age: 68
End: 2023-11-20

## 2023-11-20 ENCOUNTER — APPOINTMENT (OUTPATIENT)
Dept: CT IMAGING | Facility: CLINIC | Age: 68
End: 2023-11-20
Payer: COMMERCIAL

## 2023-11-20 DIAGNOSIS — Z98.891 HISTORY OF UTERINE SCAR FROM PREVIOUS SURGERY: Chronic | ICD-10-CM

## 2023-11-20 DIAGNOSIS — N85.02 ENDOMETRIAL INTRAEPITHELIAL NEOPLASIA [EIN]: ICD-10-CM

## 2023-11-20 PROCEDURE — 74177 CT ABD & PELVIS W/CONTRAST: CPT | Mod: 26,MH

## 2023-11-20 PROCEDURE — 74177 CT ABD & PELVIS W/CONTRAST: CPT

## 2023-11-27 ENCOUNTER — APPOINTMENT (OUTPATIENT)
Dept: GYNECOLOGIC ONCOLOGY | Facility: CLINIC | Age: 68
End: 2023-11-27
Payer: COMMERCIAL

## 2023-11-27 DIAGNOSIS — N28.1 CYST OF KIDNEY, ACQUIRED: ICD-10-CM

## 2023-11-27 DIAGNOSIS — N20.0 CALCULUS OF KIDNEY: ICD-10-CM

## 2023-11-27 DIAGNOSIS — M85.80 OTHER SPECIFIED DISORDERS OF BONE DENSITY AND STRUCTURE, UNSPECIFIED SITE: ICD-10-CM

## 2023-11-27 PROCEDURE — 99213 OFFICE O/P EST LOW 20 MIN: CPT | Mod: 95

## 2023-11-28 DIAGNOSIS — Z87.898 PERSONAL HISTORY OF OTHER SPECIFIED CONDITIONS: ICD-10-CM

## 2023-11-28 DIAGNOSIS — Z11.59 ENCOUNTER FOR SCREENING FOR OTHER VIRAL DISEASES: ICD-10-CM

## 2023-11-28 DIAGNOSIS — Z12.83 ENCOUNTER FOR SCREENING FOR MALIGNANT NEOPLASM OF SKIN: ICD-10-CM

## 2023-11-28 DIAGNOSIS — Z87.09 PERSONAL HISTORY OF OTHER DISEASES OF THE RESPIRATORY SYSTEM: ICD-10-CM

## 2023-11-28 DIAGNOSIS — Z01.812 ENCOUNTER FOR PREPROCEDURAL LABORATORY EXAMINATION: ICD-10-CM

## 2023-11-28 DIAGNOSIS — R31.29 OTHER MICROSCOPIC HEMATURIA: ICD-10-CM

## 2023-11-29 ENCOUNTER — APPOINTMENT (OUTPATIENT)
Dept: INTERNAL MEDICINE | Facility: CLINIC | Age: 68
End: 2023-11-29

## 2023-11-29 LAB
25(OH)D3 SERPL-MCNC: 51.2 NG/ML
ALBUMIN SERPL ELPH-MCNC: 4.4 G/DL
ALP BLD-CCNC: 81 U/L
ALT SERPL-CCNC: 16 U/L
ANION GAP SERPL CALC-SCNC: 10 MMOL/L
APPEARANCE: CLEAR
AST SERPL-CCNC: 18 U/L
BACTERIA: NEGATIVE /HPF
BASOPHILS # BLD AUTO: 0.04 K/UL
BASOPHILS NFR BLD AUTO: 0.7 %
BILIRUB SERPL-MCNC: 0.5 MG/DL
BILIRUBIN URINE: NEGATIVE
BLOOD URINE: ABNORMAL
BUN SERPL-MCNC: 14 MG/DL
CALCIUM SERPL-MCNC: 9.4 MG/DL
CAST: 2 /LPF
CHLORIDE SERPL-SCNC: 102 MMOL/L
CHOLEST SERPL-MCNC: 204 MG/DL
CO2 SERPL-SCNC: 29 MMOL/L
COLOR: YELLOW
CREAT SERPL-MCNC: 0.65 MG/DL
EGFR: 96 ML/MIN/1.73M2
EOSINOPHIL # BLD AUTO: 0.04 K/UL
EOSINOPHIL NFR BLD AUTO: 0.7 %
EPITHELIAL CELLS: 0 /HPF
ESTIMATED AVERAGE GLUCOSE: 123 MG/DL
GLUCOSE QUALITATIVE U: NEGATIVE MG/DL
GLUCOSE SERPL-MCNC: 103 MG/DL
HBA1C MFR BLD HPLC: 5.9 %
HCT VFR BLD CALC: 43.9 %
HDLC SERPL-MCNC: 69 MG/DL
HGB BLD-MCNC: 13.9 G/DL
IMM GRANULOCYTES NFR BLD AUTO: 0.2 %
KETONES URINE: NEGATIVE MG/DL
LDLC SERPL CALC-MCNC: 117 MG/DL
LEUKOCYTE ESTERASE URINE: ABNORMAL
LYMPHOCYTES # BLD AUTO: 2 K/UL
LYMPHOCYTES NFR BLD AUTO: 35.1 %
MAN DIFF?: NORMAL
MCHC RBC-ENTMCNC: 29.4 PG
MCHC RBC-ENTMCNC: 31.7 GM/DL
MCV RBC AUTO: 93 FL
MICROSCOPIC-UA: NORMAL
MONOCYTES # BLD AUTO: 0.41 K/UL
MONOCYTES NFR BLD AUTO: 7.2 %
NEUTROPHILS # BLD AUTO: 3.2 K/UL
NEUTROPHILS NFR BLD AUTO: 56.1 %
NITRITE URINE: NEGATIVE
NONHDLC SERPL-MCNC: 135 MG/DL
PH URINE: 7
PLATELET # BLD AUTO: 254 K/UL
POTASSIUM SERPL-SCNC: 4.2 MMOL/L
PROT SERPL-MCNC: 7.2 G/DL
PROTEIN URINE: NEGATIVE MG/DL
RBC # BLD: 4.72 M/UL
RBC # FLD: 13 %
RED BLOOD CELLS URINE: 15 /HPF
SODIUM SERPL-SCNC: 141 MMOL/L
SPECIFIC GRAVITY URINE: 1.01
TRIGL SERPL-MCNC: 97 MG/DL
UROBILINOGEN URINE: 0.2 MG/DL
WBC # FLD AUTO: 5.7 K/UL
WHITE BLOOD CELLS URINE: 15 /HPF

## 2023-11-29 RX ORDER — SODIUM PICOSULFATE, MAGNESIUM OXIDE, AND ANHYDROUS CITRIC ACID 10; 3.5; 12 MG/160ML; G/160ML; G/160ML
10-3.5-12 MG-GM LIQUID ORAL
Qty: 1 | Refills: 0 | Status: DISCONTINUED | COMMUNITY
Start: 2023-06-20 | End: 2023-11-29

## 2023-11-29 RX ORDER — SODIUM SULFATE, POTASSIUM SULFATE AND MAGNESIUM SULFATE 1.6; 3.13; 17.5 G/177ML; G/177ML; G/177ML
17.5-3.13-1.6 SOLUTION ORAL
Qty: 1 | Refills: 0 | Status: DISCONTINUED | COMMUNITY
Start: 2023-06-20 | End: 2023-11-29

## 2023-12-03 LAB — URINE CYTOLOGY: NORMAL

## 2023-12-04 ENCOUNTER — APPOINTMENT (OUTPATIENT)
Dept: UROLOGY | Facility: CLINIC | Age: 68
End: 2023-12-04
Payer: COMMERCIAL

## 2023-12-04 PROCEDURE — 99213 OFFICE O/P EST LOW 20 MIN: CPT

## 2023-12-10 RX ORDER — MISOPROSTOL 200 UG/1
200 TABLET ORAL
Qty: 2 | Refills: 0 | Status: DISCONTINUED | COMMUNITY
Start: 2023-08-23 | End: 2023-12-10

## 2023-12-10 RX ORDER — MOMETASONE FUROATE 1 MG/ML
0.1 SOLUTION TOPICAL
Qty: 1 | Refills: 2 | Status: DISCONTINUED | COMMUNITY
Start: 2021-04-20 | End: 2023-12-10

## 2023-12-10 RX ORDER — KETOCONAZOLE 20.5 MG/ML
2 SHAMPOO, SUSPENSION TOPICAL
Qty: 1 | Refills: 9 | Status: DISCONTINUED | COMMUNITY
Start: 2023-09-19 | End: 2023-12-10

## 2023-12-10 RX ORDER — LIDOCAINE HYDROCHLORIDE 20 MG/ML
2 SOLUTION ORAL; TOPICAL
Qty: 1 | Refills: 0 | Status: DISCONTINUED | COMMUNITY
Start: 2022-12-05 | End: 2023-12-10

## 2023-12-11 ENCOUNTER — RX RENEWAL (OUTPATIENT)
Age: 68
End: 2023-12-11

## 2023-12-13 ENCOUNTER — APPOINTMENT (OUTPATIENT)
Dept: INTERNAL MEDICINE | Facility: CLINIC | Age: 68
End: 2023-12-13
Payer: COMMERCIAL

## 2023-12-13 ENCOUNTER — APPOINTMENT (OUTPATIENT)
Dept: INTERNAL MEDICINE | Facility: CLINIC | Age: 68
End: 2023-12-13

## 2023-12-13 ENCOUNTER — OUTPATIENT (OUTPATIENT)
Dept: OUTPATIENT SERVICES | Facility: HOSPITAL | Age: 68
LOS: 1 days | End: 2023-12-13
Payer: COMMERCIAL

## 2023-12-13 VITALS
RESPIRATION RATE: 14 BRPM | BODY MASS INDEX: 28.32 KG/M2 | HEART RATE: 70 BPM | SYSTOLIC BLOOD PRESSURE: 120 MMHG | DIASTOLIC BLOOD PRESSURE: 70 MMHG | WEIGHT: 165 LBS

## 2023-12-13 DIAGNOSIS — Z13.228 ENCOUNTER FOR SCREENING FOR OTHER SUSPECTED ENDOCRINE DISORDER: ICD-10-CM

## 2023-12-13 DIAGNOSIS — Z98.891 HISTORY OF UTERINE SCAR FROM PREVIOUS SURGERY: Chronic | ICD-10-CM

## 2023-12-13 DIAGNOSIS — I10 ESSENTIAL (PRIMARY) HYPERTENSION: ICD-10-CM

## 2023-12-13 DIAGNOSIS — Z13.0 ENCOUNTER FOR SCREENING FOR OTHER SUSPECTED ENDOCRINE DISORDER: ICD-10-CM

## 2023-12-13 DIAGNOSIS — Z13.21 ENCOUNTER FOR SCREENING FOR OTHER SUSPECTED ENDOCRINE DISORDER: ICD-10-CM

## 2023-12-13 DIAGNOSIS — Z13.29 ENCOUNTER FOR SCREENING FOR OTHER SUSPECTED ENDOCRINE DISORDER: ICD-10-CM

## 2023-12-13 DIAGNOSIS — Z00.00 ENCOUNTER FOR GENERAL ADULT MEDICAL EXAMINATION W/OUT ABNORMAL FINDINGS: ICD-10-CM

## 2023-12-13 PROCEDURE — G0439: CPT

## 2023-12-13 PROCEDURE — 99397 PER PM REEVAL EST PAT 65+ YR: CPT

## 2023-12-13 NOTE — HEALTH RISK ASSESSMENT
[Audit-CScore] : 1 [[id number]] : 0 [Change in mental status noted] : No change in mental status noted [Language] : denies difficulty with language [Behavior] : denies difficulty with behavior [Learning/Retaining New Information] : denies difficulty learning/retaining new information [Handling Complex Tasks] : denies difficulty handling complex tasks [Reasoning] : denies difficulty with reasoning [Spatial Ability and Orientation] : denies difficulty with spatial ability and orientation [Reports changes in hearing] : Reports no changes in hearing [Reports changes in vision] : Reports no changes in vision [Reports changes in dental health] : Reports no changes in dental health

## 2023-12-13 NOTE — HISTORY OF PRESENT ILLNESS
[FreeTextEntry1] : Ms. CASIANO is here for an annual physical examination and assessment. [de-identified] : She generally feels well with no specific complaints. Her recent health has been good. Has  robotic CONCHA planned in January to treat endometrial atypia Wishes to have B complex vitamin levels checked.   Denies headache, dizziness. Denies rash, fatigue, fever, weight loss, anorexia. Denies cough, wheezing. Denies CP, SOB, EASON, edema, palpitations. Denies abdominal pain, N/V/D/C. Denies BRBPR, melena, dysphagia. Denies dysuria, frequency, hematuria, hesitancy. Denies weakness, numbness, gait instability.

## 2023-12-15 LAB — VIT B12 SERPL-MCNC: 967 PG/ML

## 2023-12-20 LAB
VIT B1 SERPL-MCNC: 116.6 NMOL/L
VIT B6 SERPL-MCNC: 7.3 UG/L

## 2023-12-21 DIAGNOSIS — Z13.21 ENCOUNTER FOR SCREENING FOR NUTRITIONAL DISORDER: ICD-10-CM

## 2023-12-21 DIAGNOSIS — Z13.29 ENCOUNTER FOR SCREENING FOR OTHER SUSPECTED ENDOCRINE DISORDER: ICD-10-CM

## 2023-12-21 DIAGNOSIS — Z13.0 ENCOUNTER FOR SCREENING FOR DISEASES OF THE BLOOD AND BLOOD-FORMING ORGANS AND CERTAIN DISORDERS INVOLVING THE IMMUNE MECHANISM: ICD-10-CM

## 2023-12-21 DIAGNOSIS — Z00.00 ENCOUNTER FOR GENERAL ADULT MEDICAL EXAMINATION WITHOUT ABNORMAL FINDINGS: ICD-10-CM

## 2023-12-21 DIAGNOSIS — Z13.228 ENCOUNTER FOR SCREENING FOR OTHER METABOLIC DISORDERS: ICD-10-CM

## 2023-12-21 LAB
NICOTINAMIDE: 44.5 NG/ML
NICOTINIC ACID: <5 NG/ML
PANTOTHENATE SERPLBLD-MCNC: 74.6 NG/ML
VIT B2 SERPL-MCNC: 319 UG/L

## 2023-12-28 ENCOUNTER — OUTPATIENT (OUTPATIENT)
Dept: OUTPATIENT SERVICES | Facility: HOSPITAL | Age: 68
LOS: 1 days | End: 2023-12-28

## 2023-12-28 VITALS
DIASTOLIC BLOOD PRESSURE: 68 MMHG | HEIGHT: 63.5 IN | OXYGEN SATURATION: 98 % | TEMPERATURE: 98 F | HEART RATE: 86 BPM | RESPIRATION RATE: 16 BRPM | SYSTOLIC BLOOD PRESSURE: 102 MMHG | WEIGHT: 164.02 LBS

## 2023-12-28 DIAGNOSIS — R93.89 ABNORMAL FINDINGS ON DIAGNOSTIC IMAGING OF OTHER SPECIFIED BODY STRUCTURES: ICD-10-CM

## 2023-12-28 DIAGNOSIS — N85.02 ENDOMETRIAL INTRAEPITHELIAL NEOPLASIA [EIN]: ICD-10-CM

## 2023-12-28 DIAGNOSIS — Z87.09 PERSONAL HISTORY OF OTHER DISEASES OF THE RESPIRATORY SYSTEM: ICD-10-CM

## 2023-12-28 DIAGNOSIS — E11.9 TYPE 2 DIABETES MELLITUS WITHOUT COMPLICATIONS: ICD-10-CM

## 2023-12-28 DIAGNOSIS — I10 ESSENTIAL (PRIMARY) HYPERTENSION: ICD-10-CM

## 2023-12-28 DIAGNOSIS — Z98.890 OTHER SPECIFIED POSTPROCEDURAL STATES: Chronic | ICD-10-CM

## 2023-12-28 DIAGNOSIS — Z98.891 HISTORY OF UTERINE SCAR FROM PREVIOUS SURGERY: Chronic | ICD-10-CM

## 2023-12-28 LAB
BLD GP AB SCN SERPL QL: NEGATIVE — SIGNIFICANT CHANGE UP
BLD GP AB SCN SERPL QL: NEGATIVE — SIGNIFICANT CHANGE UP
RH IG SCN BLD-IMP: POSITIVE — SIGNIFICANT CHANGE UP

## 2023-12-28 RX ORDER — SODIUM CHLORIDE 9 MG/ML
1000 INJECTION, SOLUTION INTRAVENOUS
Refills: 0 | Status: DISCONTINUED | OUTPATIENT
Start: 2024-01-09 | End: 2024-01-09

## 2023-12-28 RX ORDER — ROSUVASTATIN CALCIUM 5 MG/1
1 TABLET ORAL
Qty: 0 | Refills: 0 | DISCHARGE

## 2023-12-28 RX ORDER — CHLORHEXIDINE GLUCONATE 213 G/1000ML
1 SOLUTION TOPICAL ONCE
Refills: 0 | Status: DISCONTINUED | OUTPATIENT
Start: 2024-01-09 | End: 2024-01-10

## 2023-12-28 NOTE — H&P PST ADULT - NSICDXPASTMEDICALHX_GEN_ALL_CORE_FT
PAST MEDICAL HISTORY:  Asthma     Endometrial thickening on ultrasound     Hyperlipidemia     Hypertension     Long COVID     Obstructive sleep apnea with CPAP    Pre-existing type 2 diabetes mellitus     Renal calculi     Vitamin D deficiency

## 2023-12-28 NOTE — H&P PST ADULT - ATTENDING COMMENTS
Seen in ASU with family. She reports no changes. Planned procedure disc, incl EUA by learner, poss conversion to an open proc (VI), other indicated proc (eg, cysto, etc.). Consent form reviewed. All Q/A. Seen in ASU with family. She reports no changes. Planned procedure disc, incl EUA by learner, poss conversion to an open proc (VI), other indicated proc (eg, cysto, etc.). Consent form reviewed. All Q/A. D/W OR Circ RN.

## 2023-12-28 NOTE — H&P PST ADULT - HISTORY OF PRESENT ILLNESS
Pt is a 68 yr old female scheduled for Robotic Assisted Total Laparoscopic Hysterectomy B/L Salpingo oophorectomy Poss Lymph Node Sampling Exploratory Lap Vertical Incision with Dr Bass tentatively 1/9/23 - pt c/o of evaluation by GYN and sono noted thickened endometrium - pt denies abnormal bleeding or pain - pt had D&C, hysteroscopy and removal of polyps 9/23  - hx HTN, asthma (non-compliant with meds)  DMT2, sleep apnea

## 2023-12-28 NOTE — H&P PST ADULT - PROBLEM SELECTOR PLAN 1
Pt scheduled for surgery and preop instructions including instructions for taking Famotidine and for Chlorhexidine use in showering on the day of surgery, given verbally and with use of  written materials, and patient confirming understanding of such instructions using  teach back method.  Pt to take last dose of ASA 1/2/24  REquest Echo from [facility]

## 2023-12-28 NOTE — H&P PST ADULT - RESPIRATORY AND THORAX COMMENTS
Hx of asthma - pt is inconsistent in use of meds but denies need for use of Rescue inhaler - c/o of chronic reflux and postnasal drainage affecting clearing of throat

## 2024-01-03 ENCOUNTER — APPOINTMENT (OUTPATIENT)
Dept: INTERNAL MEDICINE | Facility: CLINIC | Age: 69
End: 2024-01-03
Payer: COMMERCIAL

## 2024-01-03 ENCOUNTER — APPOINTMENT (OUTPATIENT)
Dept: CARDIOLOGY | Facility: CLINIC | Age: 69
End: 2024-01-03
Payer: COMMERCIAL

## 2024-01-03 ENCOUNTER — NON-APPOINTMENT (OUTPATIENT)
Age: 69
End: 2024-01-03

## 2024-01-03 ENCOUNTER — OUTPATIENT (OUTPATIENT)
Dept: OUTPATIENT SERVICES | Facility: HOSPITAL | Age: 69
LOS: 1 days | End: 2024-01-03
Payer: COMMERCIAL

## 2024-01-03 VITALS
BODY MASS INDEX: 27.83 KG/M2 | WEIGHT: 163 LBS | HEART RATE: 105 BPM | HEIGHT: 64 IN | SYSTOLIC BLOOD PRESSURE: 120 MMHG | DIASTOLIC BLOOD PRESSURE: 81 MMHG | OXYGEN SATURATION: 100 % | RESPIRATION RATE: 17 BRPM

## 2024-01-03 VITALS
HEIGHT: 64 IN | DIASTOLIC BLOOD PRESSURE: 70 MMHG | HEART RATE: 96 BPM | BODY MASS INDEX: 27.83 KG/M2 | SYSTOLIC BLOOD PRESSURE: 130 MMHG | OXYGEN SATURATION: 96 % | WEIGHT: 163 LBS

## 2024-01-03 DIAGNOSIS — J45.909 UNSPECIFIED ASTHMA, UNCOMPLICATED: ICD-10-CM

## 2024-01-03 DIAGNOSIS — E66.9 OBESITY, UNSPECIFIED: ICD-10-CM

## 2024-01-03 DIAGNOSIS — R06.02 SHORTNESS OF BREATH: ICD-10-CM

## 2024-01-03 DIAGNOSIS — R93.89 ABNORMAL FINDINGS ON DIAGNOSTIC IMAGING OF OTHER SPECIFIED BODY STRUCTURES: ICD-10-CM

## 2024-01-03 DIAGNOSIS — N84.0 POLYP OF CORPUS UTERI: ICD-10-CM

## 2024-01-03 DIAGNOSIS — R73.03 PREDIABETES.: ICD-10-CM

## 2024-01-03 DIAGNOSIS — Z01.818 ENCOUNTER FOR OTHER PREPROCEDURAL EXAMINATION: ICD-10-CM

## 2024-01-03 DIAGNOSIS — Z98.890 OTHER SPECIFIED POSTPROCEDURAL STATES: Chronic | ICD-10-CM

## 2024-01-03 DIAGNOSIS — I10 ESSENTIAL (PRIMARY) HYPERTENSION: ICD-10-CM

## 2024-01-03 DIAGNOSIS — R73.03 PREDIABETES: ICD-10-CM

## 2024-01-03 DIAGNOSIS — E78.5 HYPERLIPIDEMIA, UNSPECIFIED: ICD-10-CM

## 2024-01-03 DIAGNOSIS — Z98.891 HISTORY OF UTERINE SCAR FROM PREVIOUS SURGERY: Chronic | ICD-10-CM

## 2024-01-03 PROCEDURE — 99214 OFFICE O/P EST MOD 30 MIN: CPT

## 2024-01-03 PROCEDURE — G0463: CPT

## 2024-01-03 PROCEDURE — 99214 OFFICE O/P EST MOD 30 MIN: CPT | Mod: 25

## 2024-01-03 PROCEDURE — 93000 ELECTROCARDIOGRAM COMPLETE: CPT

## 2024-01-03 NOTE — PHYSICAL EXAM
[TextEntry] : General/Constitutional: WD/ WN in NAD H: NC/AT Eyes:  PERRL, sclerae and conjunctivae normal without jaundice or xanthelasma; ENMT:normal teeth, gums and palate with no petechiae, pallor or cyanosis Neck: w/o JVD, thyromegaly or adenopathy; normal venous contour Respiratory: clear to auscultation, normal respiratory effort with no retractions or use of accessory respiratory muscles Heart: [id number], regular rate, normal S1, S2 without murmurs, rubs, gallops, heaves or thrills Vascular exam: normal carotid upstrokes without carotid or abdominal bruits. 2+/2+ pulses to posterior tibialis and dorsalis pedis Abdomen: soft, nontender, bowels sounds normal without hepatomegaly or splenomegaly, masses or bruits Musculoskeletal:without significant kyphosis or scoliosis Extremities: w/o CCE, good capillary filling Skin: no stasis changes; no ulcers  Neuro: AA and O x 3; no focal neurologic deficits Psych: normal mood and affect

## 2024-01-03 NOTE — HISTORY OF PRESENT ILLNESS
[Preoperative Visit] : for a medical evaluation prior to surgery [Scheduled Procedure ___] : a [unfilled] [Surgeon Name ___] : surgeon: [unfilled] [Stable] : Stable [FreeTextEntry1] : The patient is a 68-year-old woman with a history of hypertension, hyperlipidemia, obstructive sleep apnea and some issues with stress related shortness of breath since being diagnosed with COVID-19 in 2021.  She is tentatively scheduled for a robotic total hysterectomy and oophorectomy.  In 2021, after being diagnosed with COVID-19, the patient began noticing episodes of shortness of breath which seem to be more related with emotional stress and moderate exertion.  She underwent a fairly extensive cardiopulmonary evaluation including pulmonary function tests, echocardiography and stress testing all of which were within acceptable limits.  The patient's symptoms have been fairly stable over the past  two years.  Currently the symptoms are most present when she is under moderate emotional stress or performs moderate activity.  The patient is able to perform normal activities without significant difficulties.

## 2024-01-03 NOTE — DISCUSSION/SUMMARY
[FreeTextEntry1] : Hypertension-well-controlled Exertional shortness of breath post COVID-clinically stable The patient is medically cleared for the proposed procedure with no special precautions or limitations.   Total time of the encounter: 30 minutes which included but was not limited to the following: Face-to-face and non face-to-face time personally spent by the physician preparing to see the patient, obtaining and/or resuming separately obtained history, performing a medically appropriate examination and/or evaluation, counseling and educating the patient/family/caregiver, ordering medications, tests or procedures, referring and communicating with other healthcare professionals, documenting clinical information in the electronic health record, independently interpreting results and communicated results to the patient/family/caregiver and care coordination.

## 2024-01-04 ENCOUNTER — TRANSCRIPTION ENCOUNTER (OUTPATIENT)
Age: 69
End: 2024-01-04

## 2024-01-04 PROBLEM — R93.89 ENDOMETRIAL THICKENING ON ULTRASOUND: Status: ACTIVE | Noted: 2023-08-10

## 2024-01-04 PROBLEM — J45.909 RAD (REACTIVE AIRWAY DISEASE): Status: ACTIVE | Noted: 2021-03-08

## 2024-01-04 LAB
ALBUMIN SERPL ELPH-MCNC: 4.3 G/DL
ALP BLD-CCNC: 90 U/L
ALT SERPL-CCNC: 15 U/L
ANION GAP SERPL CALC-SCNC: 12 MMOL/L
APTT BLD: 33.5 SEC
AST SERPL-CCNC: 17 U/L
BASOPHILS # BLD AUTO: 0.03 K/UL
BASOPHILS NFR BLD AUTO: 0.5 %
BILIRUB SERPL-MCNC: 0.5 MG/DL
BUN SERPL-MCNC: 14 MG/DL
CALCIUM SERPL-MCNC: 9.7 MG/DL
CHLORIDE SERPL-SCNC: 102 MMOL/L
CO2 SERPL-SCNC: 28 MMOL/L
CREAT SERPL-MCNC: 0.74 MG/DL
EGFR: 88 ML/MIN/1.73M2
EOSINOPHIL # BLD AUTO: 0.09 K/UL
EOSINOPHIL NFR BLD AUTO: 1.4 %
GLUCOSE SERPL-MCNC: 97 MG/DL
HCT VFR BLD CALC: 43.9 %
HGB BLD-MCNC: 13.8 G/DL
IMM GRANULOCYTES NFR BLD AUTO: 0.2 %
INR PPP: 0.95 RATIO
LYMPHOCYTES # BLD AUTO: 2.23 K/UL
LYMPHOCYTES NFR BLD AUTO: 34.5 %
MAN DIFF?: NORMAL
MCHC RBC-ENTMCNC: 29.6 PG
MCHC RBC-ENTMCNC: 31.4 GM/DL
MCV RBC AUTO: 94.2 FL
MONOCYTES # BLD AUTO: 0.5 K/UL
MONOCYTES NFR BLD AUTO: 7.7 %
NEUTROPHILS # BLD AUTO: 3.6 K/UL
NEUTROPHILS NFR BLD AUTO: 55.7 %
PLATELET # BLD AUTO: 245 K/UL
POTASSIUM SERPL-SCNC: 4.4 MMOL/L
PROT SERPL-MCNC: 7.1 G/DL
PT BLD: 10.7 SEC
RBC # BLD: 4.66 M/UL
RBC # FLD: 12.6 %
SODIUM SERPL-SCNC: 143 MMOL/L
WBC # FLD AUTO: 6.46 K/UL

## 2024-01-04 RX ORDER — DESLORATADINE 5 MG/1
5 TABLET ORAL DAILY
Qty: 90 | Refills: 1 | Status: DISCONTINUED | COMMUNITY
Start: 2021-07-09 | End: 2024-01-04

## 2024-01-04 RX ORDER — ALBUTEROL SULFATE 90 UG/1
108 (90 BASE) INHALANT RESPIRATORY (INHALATION)
Qty: 51 | Refills: 2 | Status: DISCONTINUED | COMMUNITY
Start: 2021-07-09 | End: 2024-01-04

## 2024-01-04 RX ORDER — MONTELUKAST 10 MG/1
10 TABLET, FILM COATED ORAL
Qty: 90 | Refills: 1 | Status: DISCONTINUED | COMMUNITY
Start: 2021-07-09 | End: 2024-01-04

## 2024-01-04 RX ORDER — TIOTROPIUM BROMIDE INHALATION SPRAY 3.12 UG/1
2.5 SPRAY, METERED RESPIRATORY (INHALATION) DAILY
Qty: 3 | Refills: 1 | Status: DISCONTINUED | COMMUNITY
Start: 2021-06-02 | End: 2024-01-04

## 2024-01-04 RX ORDER — FLUTICASONE PROPIONATE AND SALMETEROL 113; 14 UG/1; UG/1
113-14 POWDER, METERED RESPIRATORY (INHALATION)
Qty: 3 | Refills: 3 | Status: DISCONTINUED | COMMUNITY
Start: 2021-02-02 | End: 2024-01-04

## 2024-01-04 RX ORDER — IPRATROPIUM BROMIDE 42 UG/1
0.06 SPRAY NASAL
Qty: 3 | Refills: 3 | Status: DISCONTINUED | COMMUNITY
Start: 2021-02-04 | End: 2024-01-04

## 2024-01-04 RX ORDER — LEVALBUTEROL HYDROCHLORIDE 0.63 MG/3ML
0.63 SOLUTION RESPIRATORY (INHALATION)
Qty: 120 | Refills: 3 | Status: DISCONTINUED | OUTPATIENT
Start: 2022-02-28 | End: 2024-01-04

## 2024-01-04 RX ORDER — BECLOMETHASONE DIPROPIONATE 80 UG/1
80 AEROSOL, METERED NASAL
Qty: 3 | Refills: 1 | Status: DISCONTINUED | COMMUNITY
Start: 2021-03-08 | End: 2024-01-04

## 2024-01-04 RX ORDER — RESPIRATORY SYNCYTIAL VIRUS VACCINE 120MCG/0.5
120 KIT INTRAMUSCULAR
Qty: 1 | Refills: 0 | Status: DISCONTINUED | COMMUNITY
Start: 2023-11-28 | End: 2024-01-04

## 2024-01-04 NOTE — ASSESSMENT
[Ischemic Heart Disease] : Ischemic Heart Disease - No (0) [Congestive Heart Failure] : Congestive Heart Failure - No (0) [Prior Cerebrovascular Accident or TIA] : Prior Cerebrovascular Accident or TIA - No (0) [Creatinine >= 2mg/dL (1 Point)] : Creatinine >= 2mg/dL - No (0) [Insulin-dependent Diabetic (1 Point)] : Insulin-dependent Diabetic - No (0) [0] : 0 , RCRI Class: I, Risk of Post-Op Cardiac Complications: 3.9%, 95% CI for Risk Estimate: 2.8% - 5.4% [Patient Optimized for Surgery] : Patient optimized for surgery [No Further Testing Recommended] : no further testing recommended [Continue medications as is] : Continue current medications [As per surgery] : as per surgery [High Risk Surgery - Intraperitoneal, Intrathoracic or Supringuinal Vascular Procedures] : High Risk Surgery - Intraperitoneal, Intrathoracic or Supringuinal Vascular Procedures - Yes (1) [1] : 1 , RCRI Class: II, Risk of Post-Op Cardiac Complications: 6.0%, 95% CI for Risk Estimate: 4.9% - 7.4% [FreeTextEntry4] : Ms. MAKSIM CASIANO is a 68 year old   female  with history of  endometrial polyp, GERD,HLD, HTN, obesity, osteoporosis, COLLINS, pre DM presented today for preop medical clearance. Pt is low risk candidate for low risk procedure with no further w/up required prior to planned surgery and no contraindication to proceeding with planned surgery.    [FreeTextEntry7] : Do not take Aspirin, NSAID( Motrin, Ibuprofen etc.), Herb, supplement 7 days prior to surgery. Take _Telmisartan__ with small sips of water in the morning of surgery. Last dose of Metformin before surgery is 48hours prior to surgery. May resume medications once surgeon clear.

## 2024-01-04 NOTE — RESULTS/DATA
[de-identified] : Reviewed LAB 11/29/2023 : CBC, CMP normal , HgbA1C 5.9%, glucose 103.  Reviewed ECG 11/8/2023 : results reviewed, NSR, HR 74 bpm, nl axis/intervals, no acute ST/T changes, no LVH.

## 2024-01-04 NOTE — HISTORY OF PRESENT ILLNESS
[Diabetes] : diabetes [(Patient denies any chest pain, claudication, dyspnea on exertion, orthopnea, palpitations or syncope)] : Patient denies any chest pain, claudication, dyspnea on exertion, orthopnea, palpitations or syncope [Moderate (4-6 METs)] : Moderate (4-6 METs) [Aortic Stenosis] : no aortic stenosis [Atrial Fibrillation] : no atrial fibrillation [Coronary Artery Disease] : no coronary artery disease [Recent Myocardial Infarction] : no recent myocardial infarction [Implantable Device/Pacemaker] : no implantable device/pacemaker [Asthma] : no asthma [COPD] : no COPD [Sleep Apnea] : no sleep apnea [Smoker] : not a smoker [Family Member] : no family member with adverse anesthesia reaction/sudden death [Self] : no previous adverse anesthesia reaction [Chronic Anticoagulation] : no chronic anticoagulation [Chronic Kidney Disease] : no chronic kidney disease [FreeTextEntry1] : Robotic Hysteroscopy, TLH and BSO [FreeTextEntry3] : Rishi Echevarria [FreeTextEntry2] : 01/09/24 [FreeTextEntry4] : Ms. MAKSIM CASIANO is a 68 year old   female  with history of  endometrial polyp, GERD,HLD, HTN, obesity, osteoporosis, COLLINS, Reactive airway disease, pre DM presented today for preop medical clearance. -SHX: D &C H/S 23 noted with Endometrial intraepithelial neoplasia( EIN). , b/l eye iridectomy. Denied fever, chills,CP, SOB, abdominal pain, n/v/c/d.  [FreeTextEntry5] : Reactive airway disease on -	[facility] [FreeTextEntry6] : Denies headache, dizziness. Denies cough, wheezing. Denies CP, , , edema, palpitations. Denies abdominal pain, N/V/D/C. Denies BRBPR, melena, dysphagia. Denies dysuria, frequency, hematuria, hesitancy. No bleeding or bruising tendencies. [FreeTextEntry8] : [facility] Activity Status Index predicts a MET of 5

## 2024-01-04 NOTE — REVIEW OF SYSTEMS
[FreeTextEntry2] : Constitutional:  no fever and no chills.  Eyes:  no discharge.  HEENT:  no earache.  Cardiovascular:  no chest pain, no palpitations and no lower extremity edema.  Respiratory:  no shortness of breath, no wheezing and no cough.  Gastrointestinal:  no abdominal pain, no nausea and no vomiting.  Genitourinary:  no dysuria.  Musculoskeletal:  no joint pain.  Integumentary:  no itching.  Neurological:  no headache.  Psychiatric:  not suicidal.  Hematologic/Lymphatic:  no easy bleeding.

## 2024-01-04 NOTE — PHYSICAL EXAM
[Normal] : soft, non-tender, non-distended, no masses palpated, no HSM and normal bowel sounds [de-identified] : WDWN in NAD HEENT:  unremarkable Neck:  supple, no JVD, no LN Lungs:  CTA B/L, no W/R/R Heart:  Reg rate, +S1S2, no M/R/G Abdomen:  soft, NT, ND, +BS, no masses, no HS-megaly Genital: No pubic or genital lesions noted. Ext:  no C/C/E Neuro:  no focal deficits

## 2024-01-08 ENCOUNTER — TRANSCRIPTION ENCOUNTER (OUTPATIENT)
Age: 69
End: 2024-01-08

## 2024-01-08 NOTE — ASU PATIENT PROFILE, ADULT - FALL HARM RISK - UNIVERSAL INTERVENTIONS
Bed in lowest position, wheels locked, appropriate side rails in place/Call bell, personal items and telephone in reach/Instruct patient to call for assistance before getting out of bed or chair/Non-slip footwear when patient is out of bed/[place] to call system/Physically safe environment - no spills, clutter or unnecessary equipment/Purposeful Proactive Rounding/Room/bathroom lighting operational, light cord in reach Bed in lowest position, wheels locked, appropriate side rails in place/Call bell, personal items and telephone in reach/Instruct patient to call for assistance before getting out of bed or chair/Non-slip footwear when patient is out of bed/[place] to call system/Physically safe environment - no spills, clutter or unnecessary equipment/Purposeful Proactive Rounding/Room/bathroom lighting operational, light cord in reach

## 2024-01-09 ENCOUNTER — APPOINTMENT (OUTPATIENT)
Dept: GYNECOLOGIC ONCOLOGY | Facility: HOSPITAL | Age: 69
End: 2024-01-09

## 2024-01-09 ENCOUNTER — TRANSCRIPTION ENCOUNTER (OUTPATIENT)
Age: 69
End: 2024-01-09

## 2024-01-09 ENCOUNTER — RESULT REVIEW (OUTPATIENT)
Age: 69
End: 2024-01-09

## 2024-01-09 ENCOUNTER — INPATIENT (INPATIENT)
Facility: HOSPITAL | Age: 69
LOS: 0 days | Discharge: HOME | End: 2024-01-10
Attending: OBSTETRICS & GYNECOLOGY | Admitting: OBSTETRICS & GYNECOLOGY
Payer: COMMERCIAL

## 2024-01-09 VITALS
HEIGHT: 63 IN | RESPIRATION RATE: 18 BRPM | TEMPERATURE: 97 F | HEART RATE: 96 BPM | SYSTOLIC BLOOD PRESSURE: 134 MMHG | DIASTOLIC BLOOD PRESSURE: 78 MMHG | OXYGEN SATURATION: 100 % | WEIGHT: 164.91 LBS

## 2024-01-09 DIAGNOSIS — Z98.890 OTHER SPECIFIED POSTPROCEDURAL STATES: Chronic | ICD-10-CM

## 2024-01-09 DIAGNOSIS — N85.02 ENDOMETRIAL INTRAEPITHELIAL NEOPLASIA [EIN]: ICD-10-CM

## 2024-01-09 DIAGNOSIS — Z98.891 HISTORY OF UTERINE SCAR FROM PREVIOUS SURGERY: Chronic | ICD-10-CM

## 2024-01-09 LAB
ANION GAP SERPL CALC-SCNC: 11 MMOL/L — SIGNIFICANT CHANGE UP (ref 7–14)
ANION GAP SERPL CALC-SCNC: 11 MMOL/L — SIGNIFICANT CHANGE UP (ref 7–14)
BUN SERPL-MCNC: 9 MG/DL — SIGNIFICANT CHANGE UP (ref 7–23)
BUN SERPL-MCNC: 9 MG/DL — SIGNIFICANT CHANGE UP (ref 7–23)
CALCIUM SERPL-MCNC: 8.7 MG/DL — SIGNIFICANT CHANGE UP (ref 8.4–10.5)
CALCIUM SERPL-MCNC: 8.7 MG/DL — SIGNIFICANT CHANGE UP (ref 8.4–10.5)
CHLORIDE SERPL-SCNC: 106 MMOL/L — SIGNIFICANT CHANGE UP (ref 98–107)
CHLORIDE SERPL-SCNC: 106 MMOL/L — SIGNIFICANT CHANGE UP (ref 98–107)
CO2 SERPL-SCNC: 23 MMOL/L — SIGNIFICANT CHANGE UP (ref 22–31)
CO2 SERPL-SCNC: 23 MMOL/L — SIGNIFICANT CHANGE UP (ref 22–31)
CREAT SERPL-MCNC: 0.58 MG/DL — SIGNIFICANT CHANGE UP (ref 0.5–1.3)
CREAT SERPL-MCNC: 0.58 MG/DL — SIGNIFICANT CHANGE UP (ref 0.5–1.3)
EGFR: 99 ML/MIN/1.73M2 — SIGNIFICANT CHANGE UP
EGFR: 99 ML/MIN/1.73M2 — SIGNIFICANT CHANGE UP
GLUCOSE BLDC GLUCOMTR-MCNC: 108 MG/DL — HIGH (ref 70–99)
GLUCOSE BLDC GLUCOMTR-MCNC: 108 MG/DL — HIGH (ref 70–99)
GLUCOSE BLDC GLUCOMTR-MCNC: 120 MG/DL — HIGH (ref 70–99)
GLUCOSE BLDC GLUCOMTR-MCNC: 120 MG/DL — HIGH (ref 70–99)
GLUCOSE BLDC GLUCOMTR-MCNC: 122 MG/DL — HIGH (ref 70–99)
GLUCOSE BLDC GLUCOMTR-MCNC: 122 MG/DL — HIGH (ref 70–99)
GLUCOSE SERPL-MCNC: 126 MG/DL — HIGH (ref 70–99)
GLUCOSE SERPL-MCNC: 126 MG/DL — HIGH (ref 70–99)
HCT VFR BLD CALC: 36.5 % — SIGNIFICANT CHANGE UP (ref 34.5–45)
HCT VFR BLD CALC: 36.5 % — SIGNIFICANT CHANGE UP (ref 34.5–45)
HGB BLD-MCNC: 11.8 G/DL — SIGNIFICANT CHANGE UP (ref 11.5–15.5)
HGB BLD-MCNC: 11.8 G/DL — SIGNIFICANT CHANGE UP (ref 11.5–15.5)
MCHC RBC-ENTMCNC: 29.8 PG — SIGNIFICANT CHANGE UP (ref 27–34)
MCHC RBC-ENTMCNC: 29.8 PG — SIGNIFICANT CHANGE UP (ref 27–34)
MCHC RBC-ENTMCNC: 32.3 GM/DL — SIGNIFICANT CHANGE UP (ref 32–36)
MCHC RBC-ENTMCNC: 32.3 GM/DL — SIGNIFICANT CHANGE UP (ref 32–36)
MCV RBC AUTO: 92.2 FL — SIGNIFICANT CHANGE UP (ref 80–100)
MCV RBC AUTO: 92.2 FL — SIGNIFICANT CHANGE UP (ref 80–100)
NRBC # BLD: 0 /100 WBCS — SIGNIFICANT CHANGE UP (ref 0–0)
NRBC # BLD: 0 /100 WBCS — SIGNIFICANT CHANGE UP (ref 0–0)
NRBC # FLD: 0 K/UL — SIGNIFICANT CHANGE UP (ref 0–0)
NRBC # FLD: 0 K/UL — SIGNIFICANT CHANGE UP (ref 0–0)
PLATELET # BLD AUTO: 195 K/UL — SIGNIFICANT CHANGE UP (ref 150–400)
PLATELET # BLD AUTO: 195 K/UL — SIGNIFICANT CHANGE UP (ref 150–400)
POTASSIUM SERPL-MCNC: 4.1 MMOL/L — SIGNIFICANT CHANGE UP (ref 3.5–5.3)
POTASSIUM SERPL-MCNC: 4.1 MMOL/L — SIGNIFICANT CHANGE UP (ref 3.5–5.3)
POTASSIUM SERPL-SCNC: 4.1 MMOL/L — SIGNIFICANT CHANGE UP (ref 3.5–5.3)
POTASSIUM SERPL-SCNC: 4.1 MMOL/L — SIGNIFICANT CHANGE UP (ref 3.5–5.3)
RBC # BLD: 3.96 M/UL — SIGNIFICANT CHANGE UP (ref 3.8–5.2)
RBC # BLD: 3.96 M/UL — SIGNIFICANT CHANGE UP (ref 3.8–5.2)
RBC # FLD: 12.4 % — SIGNIFICANT CHANGE UP (ref 10.3–14.5)
RBC # FLD: 12.4 % — SIGNIFICANT CHANGE UP (ref 10.3–14.5)
SODIUM SERPL-SCNC: 140 MMOL/L — SIGNIFICANT CHANGE UP (ref 135–145)
SODIUM SERPL-SCNC: 140 MMOL/L — SIGNIFICANT CHANGE UP (ref 135–145)
WBC # BLD: 11.62 K/UL — HIGH (ref 3.8–10.5)
WBC # BLD: 11.62 K/UL — HIGH (ref 3.8–10.5)
WBC # FLD AUTO: 11.62 K/UL — HIGH (ref 3.8–10.5)
WBC # FLD AUTO: 11.62 K/UL — HIGH (ref 3.8–10.5)

## 2024-01-09 PROCEDURE — 88342 IMHCHEM/IMCYTCHM 1ST ANTB: CPT | Mod: 26

## 2024-01-09 PROCEDURE — 57800 DILATION OF CERVICAL CANAL: CPT | Mod: 59

## 2024-01-09 PROCEDURE — 88341 IMHCHEM/IMCYTCHM EA ADD ANTB: CPT | Mod: 26

## 2024-01-09 PROCEDURE — 88307 TISSUE EXAM BY PATHOLOGIST: CPT | Mod: 26

## 2024-01-09 PROCEDURE — 58571 TLH W/T/O 250 G OR LESS: CPT | Mod: AS

## 2024-01-09 PROCEDURE — 88331 PATH CONSLTJ SURG 1 BLK 1SPC: CPT | Mod: 26

## 2024-01-09 PROCEDURE — 88112 CYTOPATH CELL ENHANCE TECH: CPT | Mod: 26

## 2024-01-09 PROCEDURE — 88305 TISSUE EXAM BY PATHOLOGIST: CPT | Mod: 26

## 2024-01-09 PROCEDURE — 58571 TLH W/T/O 250 G OR LESS: CPT

## 2024-01-09 DEVICE — SURGICEL NU-KNIT 6 X 9": Type: IMPLANTABLE DEVICE | Site: BILATERAL | Status: FUNCTIONAL

## 2024-01-09 DEVICE — VISTASEAL FIBRIN HUMAN 10ML: Type: IMPLANTABLE DEVICE | Site: BILATERAL | Status: FUNCTIONAL

## 2024-01-09 RX ORDER — ONDANSETRON 8 MG/1
8 TABLET, FILM COATED ORAL EVERY 8 HOURS
Refills: 0 | Status: DISCONTINUED | OUTPATIENT
Start: 2024-01-09 | End: 2024-01-10

## 2024-01-09 RX ORDER — ACETAMINOPHEN 500 MG
975 TABLET ORAL EVERY 6 HOURS
Refills: 0 | Status: DISCONTINUED | OUTPATIENT
Start: 2024-01-09 | End: 2024-01-10

## 2024-01-09 RX ORDER — METOPROLOL TARTRATE 50 MG
5 TABLET ORAL EVERY 6 HOURS
Refills: 0 | Status: DISCONTINUED | OUTPATIENT
Start: 2024-01-09 | End: 2024-01-10

## 2024-01-09 RX ORDER — IBUPROFEN 200 MG
600 TABLET ORAL EVERY 6 HOURS
Refills: 0 | Status: DISCONTINUED | OUTPATIENT
Start: 2024-01-09 | End: 2024-01-10

## 2024-01-09 RX ORDER — HEPARIN SODIUM 5000 [USP'U]/ML
5000 INJECTION INTRAVENOUS; SUBCUTANEOUS EVERY 8 HOURS
Refills: 0 | Status: DISCONTINUED | OUTPATIENT
Start: 2024-01-09 | End: 2024-01-10

## 2024-01-09 RX ORDER — TELMISARTAN 20 MG/1
1 TABLET ORAL
Qty: 0 | Refills: 0 | DISCHARGE

## 2024-01-09 RX ORDER — ASPIRIN/CALCIUM CARB/MAGNESIUM 324 MG
1 TABLET ORAL
Qty: 0 | Refills: 0 | DISCHARGE

## 2024-01-09 RX ORDER — SODIUM CHLORIDE 9 MG/ML
1000 INJECTION, SOLUTION INTRAVENOUS
Refills: 0 | Status: DISCONTINUED | OUTPATIENT
Start: 2024-01-09 | End: 2024-01-10

## 2024-01-09 RX ORDER — DEXTROSE 50 % IN WATER 50 %
25 SYRINGE (ML) INTRAVENOUS ONCE
Refills: 0 | Status: DISCONTINUED | OUTPATIENT
Start: 2024-01-09 | End: 2024-01-10

## 2024-01-09 RX ORDER — SIMETHICONE 80 MG/1
80 TABLET, CHEWABLE ORAL EVERY 6 HOURS
Refills: 0 | Status: DISCONTINUED | OUTPATIENT
Start: 2024-01-09 | End: 2024-01-10

## 2024-01-09 RX ORDER — FAMOTIDINE 10 MG/ML
1 INJECTION INTRAVENOUS
Refills: 0 | DISCHARGE

## 2024-01-09 RX ORDER — IPRATROPIUM BROMIDE 21 MCG
2 AEROSOL, SPRAY (ML) NASAL
Refills: 0 | DISCHARGE

## 2024-01-09 RX ORDER — SODIUM CHLORIDE 9 MG/ML
250 INJECTION, SOLUTION INTRAVENOUS ONCE
Refills: 0 | Status: COMPLETED | OUTPATIENT
Start: 2024-01-09 | End: 2024-01-09

## 2024-01-09 RX ORDER — INSULIN LISPRO 100/ML
VIAL (ML) SUBCUTANEOUS AT BEDTIME
Refills: 0 | Status: DISCONTINUED | OUTPATIENT
Start: 2024-01-09 | End: 2024-01-10

## 2024-01-09 RX ORDER — DEXTROSE 50 % IN WATER 50 %
15 SYRINGE (ML) INTRAVENOUS ONCE
Refills: 0 | Status: DISCONTINUED | OUTPATIENT
Start: 2024-01-09 | End: 2024-01-10

## 2024-01-09 RX ORDER — GLUCAGON INJECTION, SOLUTION 0.5 MG/.1ML
1 INJECTION, SOLUTION SUBCUTANEOUS ONCE
Refills: 0 | Status: DISCONTINUED | OUTPATIENT
Start: 2024-01-09 | End: 2024-01-10

## 2024-01-09 RX ORDER — SENNA PLUS 8.6 MG/1
1 TABLET ORAL AT BEDTIME
Refills: 0 | Status: DISCONTINUED | OUTPATIENT
Start: 2024-01-09 | End: 2024-01-10

## 2024-01-09 RX ORDER — HYDROMORPHONE HYDROCHLORIDE 2 MG/ML
0.5 INJECTION INTRAMUSCULAR; INTRAVENOUS; SUBCUTANEOUS
Refills: 0 | Status: DISCONTINUED | OUTPATIENT
Start: 2024-01-09 | End: 2024-01-09

## 2024-01-09 RX ORDER — MONTELUKAST 4 MG/1
1 TABLET, CHEWABLE ORAL
Refills: 0 | DISCHARGE

## 2024-01-09 RX ORDER — ALBUTEROL 90 UG/1
2 AEROSOL, METERED ORAL
Refills: 0 | DISCHARGE

## 2024-01-09 RX ORDER — METFORMIN HYDROCHLORIDE 850 MG/1
1 TABLET ORAL
Refills: 0 | DISCHARGE

## 2024-01-09 RX ORDER — DEXTROSE 50 % IN WATER 50 %
12.5 SYRINGE (ML) INTRAVENOUS ONCE
Refills: 0 | Status: DISCONTINUED | OUTPATIENT
Start: 2024-01-09 | End: 2024-01-10

## 2024-01-09 RX ORDER — BUDESONIDE, GLYCOPYRROLATE, AND FORMOTEROL FUMARATE 160; 9; 4.8 UG/1; UG/1; UG/1
2 AEROSOL, METERED RESPIRATORY (INHALATION)
Refills: 0 | DISCHARGE

## 2024-01-09 RX ORDER — CHOLECALCIFEROL (VITAMIN D3) 125 MCG
1 CAPSULE ORAL
Refills: 0 | DISCHARGE

## 2024-01-09 RX ORDER — METOCLOPRAMIDE HCL 10 MG
10 TABLET ORAL ONCE
Refills: 0 | Status: COMPLETED | OUTPATIENT
Start: 2024-01-09 | End: 2024-01-09

## 2024-01-09 RX ORDER — ONDANSETRON 8 MG/1
4 TABLET, FILM COATED ORAL ONCE
Refills: 0 | Status: COMPLETED | OUTPATIENT
Start: 2024-01-09 | End: 2024-01-09

## 2024-01-09 RX ORDER — INSULIN LISPRO 100/ML
VIAL (ML) SUBCUTANEOUS
Refills: 0 | Status: DISCONTINUED | OUTPATIENT
Start: 2024-01-09 | End: 2024-01-10

## 2024-01-09 RX ORDER — KETOROLAC TROMETHAMINE 30 MG/ML
30 SYRINGE (ML) INJECTION ONCE
Refills: 0 | Status: DISCONTINUED | OUTPATIENT
Start: 2024-01-09 | End: 2024-01-09

## 2024-01-09 RX ADMIN — SIMETHICONE 80 MILLIGRAM(S): 80 TABLET, CHEWABLE ORAL at 21:51

## 2024-01-09 RX ADMIN — SODIUM CHLORIDE 125 MILLILITER(S): 9 INJECTION, SOLUTION INTRAVENOUS at 14:38

## 2024-01-09 RX ADMIN — SODIUM CHLORIDE 30 MILLILITER(S): 9 INJECTION, SOLUTION INTRAVENOUS at 07:19

## 2024-01-09 RX ADMIN — HEPARIN SODIUM 5000 UNIT(S): 5000 INJECTION INTRAVENOUS; SUBCUTANEOUS at 21:22

## 2024-01-09 RX ADMIN — Medication 30 MILLIGRAM(S): at 14:40

## 2024-01-09 RX ADMIN — Medication 975 MILLIGRAM(S): at 17:52

## 2024-01-09 RX ADMIN — Medication 975 MILLIGRAM(S): at 19:00

## 2024-01-09 RX ADMIN — Medication 10 MILLIGRAM(S): at 16:04

## 2024-01-09 RX ADMIN — ONDANSETRON 4 MILLIGRAM(S): 8 TABLET, FILM COATED ORAL at 14:40

## 2024-01-09 RX ADMIN — Medication 30 MILLIGRAM(S): at 15:15

## 2024-01-09 RX ADMIN — HEPARIN SODIUM 5000 UNIT(S): 5000 INJECTION INTRAVENOUS; SUBCUTANEOUS at 15:00

## 2024-01-09 RX ADMIN — SODIUM CHLORIDE 125 MILLILITER(S): 9 INJECTION, SOLUTION INTRAVENOUS at 21:22

## 2024-01-09 RX ADMIN — SODIUM CHLORIDE 250 MILLILITER(S): 9 INJECTION, SOLUTION INTRAVENOUS at 16:19

## 2024-01-09 NOTE — BRIEF OPERATIVE NOTE - NSICDXBRIEFPOSTOP_GEN_ALL_CORE_FT
POST-OP DIAGNOSIS:  Endometrial intraepithelial neoplasia (EIN) 09-Jan-2024 13:33:16  Ghislaine Cunningham   POST-OP DIAGNOSIS:  Endometrial intraepithelial neoplasia (EIN) 09-Jan-2024 13:33:16  Ghislaine Cunningham  Abdominal adhesions 09-Jan-2024 18:32:38  Rishi Bass  Pelvic adhesions 09-Jan-2024 18:32:48  Rishi Bass

## 2024-01-09 NOTE — BRIEF OPERATIVE NOTE - NSICDXBRIEFPREOP_GEN_ALL_CORE_FT
PRE-OP DIAGNOSIS:  Endometrial intraepithelial neoplasia (EIN) 09-Jan-2024 13:33:12  Ghislaine Cunningham

## 2024-01-09 NOTE — BRIEF OPERATIVE NOTE - NSICDXBRIEFPROCEDURE_GEN_ALL_CORE_FT
PROCEDURES:  Robot-assisted laparoscopic total hysterectomy with bilateral salpingo-oophorectomy (BSO) and cystoscopy using da Abbie Xi 09-Jan-2024 13:25:19  Ghislaine Cunningham  Robot-assisted laparoscopic lysis of adhesions 09-Jan-2024 13:25:35  Ghislaine Cunningham  Omental biopsy 09-Jan-2024 13:25:43  Ghislaine Cunningham

## 2024-01-09 NOTE — CHART NOTE - NSCHARTNOTEFT_GEN_A_CORE
GYNECOLOGIC ONCOLOGY PA POST OP  NOTE    Patient seen and examined at bedside c/o nausea, Zofran given with good relief. Pain well controlled. Patient denies headache, dizziness, vomiting, chest pain, palpitations and sob. Patient is tolerating water and ice chips. Bigsg in situ (clear/yellow urine in bag).    OBJECTIVE:     VITALS:  T(F): 97.6 (01-09-24 @ 15:00), Max: 98.2 (01-09-24 @ 13:05)  HR: 71 (01-09-24 @ 15:15) (58 - 96)  BP: 134/66 (01-09-24 @ 15:15) (108/61 - 134/78)  RR: 16 (01-09-24 @ 15:15) (12 - 19)  SpO2: 100% (01-09-24 @ 15:15) (95% - 100%)  Wt(kg): --    I&O's Summary    09 Jan 2024 07:01  -  09 Jan 2024 15:29  --------------------------------------------------------  IN: 250 mL / OUT: 80 mL / NET: 170 mL        MEDICATIONS  (STANDING):  acetaminophen     Tablet .. 975 milliGRAM(s) Oral every 6 hours  chlorhexidine 2% Cloths 1 Application(s) Topical once  dextrose 5%. 1000 milliLiter(s) (50 mL/Hr) IV Continuous <Continuous>  dextrose 5%. 1000 milliLiter(s) (100 mL/Hr) IV Continuous <Continuous>  dextrose 50% Injectable 25 Gram(s) IV Push once  dextrose 50% Injectable 12.5 Gram(s) IV Push once  dextrose 50% Injectable 25 Gram(s) IV Push once  glucagon  Injectable 1 milliGRAM(s) IntraMuscular once  heparin   Injectable 5000 Unit(s) SubCutaneous every 8 hours  insulin lispro (ADMELOG) corrective regimen sliding scale   SubCutaneous three times a day before meals  insulin lispro (ADMELOG) corrective regimen sliding scale   SubCutaneous at bedtime  lactated ringers. 1000 milliLiter(s) (30 mL/Hr) IV Continuous <Continuous>  lactated ringers. 1000 milliLiter(s) (125 mL/Hr) IV Continuous <Continuous>    MEDICATIONS  (PRN):  dextrose Oral Gel 15 Gram(s) Oral once PRN Blood Glucose LESS THAN 70 milliGRAM(s)/deciliter  HYDROmorphone  Injectable 0.5 milliGRAM(s) IV Push every 10 minutes PRN Moderate Pain (4 - 6)  ibuprofen  Tablet. 600 milliGRAM(s) Oral every 6 hours PRN Mild Pain (1 - 3)  ondansetron    Tablet 8 milliGRAM(s) Oral every 8 hours PRN Nausea and/or Vomiting  senna 1 Tablet(s) Oral at bedtime PRN Constipation  simethicone 80 milliGRAM(s) Chew every 6 hours PRN Gas      Physical Exam:  Constitutional: NAD  Pulmonary: clear to auscultation bilaterally   Cardiovascular: Regular rate and rhythm   Abdomen: soft, non-distended, appropriately tender, middle scope site w/blood stains, others C/D/I  Extremities: no lower extremity edema or calve tenderness, bilat venodynes      LABS:                        11.8   11.62 )-----------( 195      ( 09 Jan 2024 13:30 )             36.5     01-09    140  |  106  |  9   ----------------------------<  126<H>  4.1   |  23  |  0.58    Ca    8.7      09 Jan 2024 13:30        Urinalysis Basic - ( 09 Jan 2024 13:30 )    Color: x / Appearance: x / SG: x / pH: x  Gluc: 126 mg/dL / Ketone: x  / Bili: x / Urobili: x   Blood: x / Protein: x / Nitrite: x   Leuk Esterase: x / RBC: x / WBC x   Sq Epi: x / Non Sq Epi: x / Bacteria: x      ASSESSMENT/PLAN:  69 yo female s/p Robot-assisted Total laparoscopic hysterectomy with bilateral salpingo-oophorectomy (BSO), Omental biopsy, lysis of adhesions and cystoscopy (Frozen section of uterus/cervix/bilateral adnexa demonstrated atypical hyperplasia). See operative note for details.  -hsq q 8hrs  -LR@125  -CCRegular diet   -Biggs catheter overnight  -ISS and glucose monitoring (DMT2)  -bilat venodynes when resting in bed  -encourage ambulation and IS use  - (COLLINS)  -Tylenol, Motrin TC, prn Dilaudid  -Zofran prn   -Mylicon, Vu  -am CBC/BMP  dispo: admit to 4T for routine post operative care    Jose Yusuf PA-C  #[id number] GYNECOLOGIC ONCOLOGY PA POST OP  NOTE    Patient seen and examined at bedside c/o nausea, Zofran given with good relief. Pain well controlled. Patient denies headache, dizziness, vomiting, chest pain, palpitations and sob. Patient is tolerating water and ice chips. Biggs in situ (clear/yellow urine in bag).    OBJECTIVE:     VITALS:  T(F): 97.6 (01-09-24 @ 15:00), Max: 98.2 (01-09-24 @ 13:05)  HR: 71 (01-09-24 @ 15:15) (58 - 96)  BP: 134/66 (01-09-24 @ 15:15) (108/61 - 134/78)  RR: 16 (01-09-24 @ 15:15) (12 - 19)  SpO2: 100% (01-09-24 @ 15:15) (95% - 100%)  Wt(kg): --    I&O's Summary    09 Jan 2024 07:01  -  09 Jan 2024 15:29  --------------------------------------------------------  IN: 250 mL / OUT: 80 mL / NET: 170 mL        MEDICATIONS  (STANDING):  acetaminophen     Tablet .. 975 milliGRAM(s) Oral every 6 hours  chlorhexidine 2% Cloths 1 Application(s) Topical once  dextrose 5%. 1000 milliLiter(s) (50 mL/Hr) IV Continuous <Continuous>  dextrose 5%. 1000 milliLiter(s) (100 mL/Hr) IV Continuous <Continuous>  dextrose 50% Injectable 25 Gram(s) IV Push once  dextrose 50% Injectable 12.5 Gram(s) IV Push once  dextrose 50% Injectable 25 Gram(s) IV Push once  glucagon  Injectable 1 milliGRAM(s) IntraMuscular once  heparin   Injectable 5000 Unit(s) SubCutaneous every 8 hours  insulin lispro (ADMELOG) corrective regimen sliding scale   SubCutaneous three times a day before meals  insulin lispro (ADMELOG) corrective regimen sliding scale   SubCutaneous at bedtime  lactated ringers. 1000 milliLiter(s) (30 mL/Hr) IV Continuous <Continuous>  lactated ringers. 1000 milliLiter(s) (125 mL/Hr) IV Continuous <Continuous>    MEDICATIONS  (PRN):  dextrose Oral Gel 15 Gram(s) Oral once PRN Blood Glucose LESS THAN 70 milliGRAM(s)/deciliter  HYDROmorphone  Injectable 0.5 milliGRAM(s) IV Push every 10 minutes PRN Moderate Pain (4 - 6)  ibuprofen  Tablet. 600 milliGRAM(s) Oral every 6 hours PRN Mild Pain (1 - 3)  ondansetron    Tablet 8 milliGRAM(s) Oral every 8 hours PRN Nausea and/or Vomiting  senna 1 Tablet(s) Oral at bedtime PRN Constipation  simethicone 80 milliGRAM(s) Chew every 6 hours PRN Gas      Physical Exam:  Constitutional: NAD  Pulmonary: clear to auscultation bilaterally   Cardiovascular: Regular rate and rhythm   Abdomen: soft, non-distended, appropriately tender, middle scope site w/blood stains, others C/D/I  Extremities: no lower extremity edema or calve tenderness, bilat venodynes      LABS:                        11.8   11.62 )-----------( 195      ( 09 Jan 2024 13:30 )             36.5     01-09    140  |  106  |  9   ----------------------------<  126<H>  4.1   |  23  |  0.58    Ca    8.7      09 Jan 2024 13:30        Urinalysis Basic - ( 09 Jan 2024 13:30 )    Color: x / Appearance: x / SG: x / pH: x  Gluc: 126 mg/dL / Ketone: x  / Bili: x / Urobili: x   Blood: x / Protein: x / Nitrite: x   Leuk Esterase: x / RBC: x / WBC x   Sq Epi: x / Non Sq Epi: x / Bacteria: x      ASSESSMENT/PLAN:  67 yo female s/p Robot-assisted Total laparoscopic hysterectomy with bilateral salpingo-oophorectomy (BSO), Omental biopsy, lysis of adhesions and cystoscopy (Frozen section of uterus/cervix/bilateral adnexa demonstrated atypical hyperplasia). See operative note for details.  -hsq q 8hrs  -LR@125  -CCRegular diet   -Biggs catheter overnight  -ISS and glucose monitoring (DMT2)  -bilat venodynes when resting in bed  -encourage ambulation and IS use  - (COLLINS)  -Tylenol, Motrin TC, prn Dilaudid  -Zofran prn   -Mylicon, Vu  -am CBC/BMP  dispo: admit to 4T for routine post operative care    Jose Yusuf PA-C  #[id number]

## 2024-01-09 NOTE — PATIENT PROFILE ADULT - FALL HARM RISK - UNIVERSAL INTERVENTIONS
Bed in lowest position, wheels locked, appropriate side rails in place/Call bell, personal items and telephone in reach/Instruct patient to call for assistance before getting out of bed or chair/Non-slip footwear when patient is out of bed/[place] to call system/Physically safe environment - no spills, clutter or unnecessary equipment/Purposeful Proactive Rounding/Room/bathroom lighting operational, light cord in reach Bed in lowest position, wheels locked, appropriate side rails in place/Call bell, personal items and telephone in reach/Instruct patient to call for assistance before getting out of bed or chair/Non-slip footwear when patient is out of bed/[place] to call system/Physically safe environment - no spills, clutter or unnecessary equipment/Purposeful Proactive Rounding/Room/bathroom lighting operational, light cord in reach

## 2024-01-09 NOTE — BRIEF OPERATIVE NOTE - SPECIMENS
Pelvic washings, uterus/cervix/bilateral adnexa, omentum adherent to bladder Perotneal washings, uterus/cervix/bilateral adnexa, omentum adherent to bladder

## 2024-01-09 NOTE — BRIEF OPERATIVE NOTE - COMMENTS
Dictation per Dr. Bass Dictation per Dr. Shelia LEE, Rishi Mandujano PA-C, served as the first assistant in this operation. I assisted in placing ports, docking, and targeting the da Abbie robot, first assisted at the surgical field while the surgeon was performing the operation at the robotic console by providing instrument exchanges, tissue retraction, suction and irrigation, specimen retrieval, passing and removing sutures and sponges, undocking the robotic platform, and closed surgical wounds.

## 2024-01-09 NOTE — BRIEF OPERATIVE NOTE - OPERATION/FINDINGS
EUA revealed normal external genitalia, normal cervix, ~6 wk size anteverted uterus. No adnexal fullness. LSC survey of the abdomen revealed grossly normal liver edge, stomach, and bowel. Omental adhesions to anterior abdominal wall carefully taken down. LSC survey of the pelvis revealed grossly normal uterus, bilateral fallopian tubes, and bilateral ovaries. Adhesions from lower uterine segment to bladder and anterior abdominal wall carefully taken down. Cysto revealed normal bladder with vigorous efflux from b/l UOs. Vistaseal utilized. Excellent hemostasis noted. Pelvic washings, uterus/cervix/bilateral adnexa, and omental biopsy adherent to bladder sent to pathology. Frozen section of uterus/cervix/bilateral adnexa demonstrated atypical hyperplasia. EUA revealed normal external genitalia, normal cervix, ~6 wk size anteverted uterus. No adnexal fullness. LSC survey of the abdomen revealed atraumatic entry site, grossly normal liver edge, stomach, and bowel. Omental adhesions to anterior abdominal wall carefully taken down. LSC survey of the pelvis revealed grossly normal uterus, bilateral fallopian tubes, and bilateral ovaries. Adhesions from lower uterine segment to bladder and anterior abdominal wall carefully taken down. Cysto revealed normal bladder with vigorous efflux from b/l UOs. Vistaseal utilized. Excellent hemostasis noted. Pelvic washings, uterus/cervix/bilateral adnexa, and omental biopsy adherent to bladder sent to pathology. Frozen section of uterus/cervix/bilateral adnexa demonstrated atypical hyperplasia. EUA revealed normal external genitalia, normal cervix, ~6 wk size anteverted uterus. No adnexal fullness. LSC survey of the abdomen revealed atraumatic entry site, grossly normal liver edge, stomach, appendix, and bowel. Omental adhesions to anterior abdominal wall and in pelvis to ant CDS and uterine cornua. LSC survey of the pelvis revealed grossly normal uterus, bilateral fallopian tubes, and bilateral ovaries. Adhesions from lower uterine segment to bladder and anterior abdominal wall carefully taken down. Cysto revealed normal bladder with vigorous efflux from b/l UOs. Vistaseal, SSKN utilized. Excellent hemostasis noted. Pelvic washings, uterus/cervix/bilateral adnexa, and omental biopsy adherent to bladder sent to pathology. Frozen section of uterus/cervix/bilateral adnexa demonstrated atypical hyperplasia; no carcinoma. CC.

## 2024-01-09 NOTE — ASU PREOP CHECKLIST - SELECT TESTS ORDERED
99@ 0637/BMP/CBC/Type and Screen/POCT Blood Glucose 108 @ 06:37/BMP/CBC/Type and Screen/POCT Blood Glucose

## 2024-01-10 ENCOUNTER — TRANSCRIPTION ENCOUNTER (OUTPATIENT)
Age: 69
End: 2024-01-10

## 2024-01-10 VITALS
TEMPERATURE: 98 F | OXYGEN SATURATION: 99 % | HEART RATE: 77 BPM | DIASTOLIC BLOOD PRESSURE: 58 MMHG | RESPIRATION RATE: 17 BRPM | SYSTOLIC BLOOD PRESSURE: 129 MMHG

## 2024-01-10 DIAGNOSIS — I10 ESSENTIAL (PRIMARY) HYPERTENSION: ICD-10-CM

## 2024-01-10 DIAGNOSIS — R93.89 ABNORMAL FINDINGS ON DIAGNOSTIC IMAGING OF OTHER SPECIFIED BODY STRUCTURES: ICD-10-CM

## 2024-01-10 DIAGNOSIS — Z98.890 OTHER SPECIFIED POSTPROCEDURAL STATES: ICD-10-CM

## 2024-01-10 DIAGNOSIS — R73.03 PREDIABETES: ICD-10-CM

## 2024-01-10 DIAGNOSIS — E78.5 HYPERLIPIDEMIA, UNSPECIFIED: ICD-10-CM

## 2024-01-10 DIAGNOSIS — U09.9 POST COVID-19 CONDITION, UNSPECIFIED: ICD-10-CM

## 2024-01-10 DIAGNOSIS — Z29.9 ENCOUNTER FOR PROPHYLACTIC MEASURES, UNSPECIFIED: ICD-10-CM

## 2024-01-10 LAB
A1C WITH ESTIMATED AVERAGE GLUCOSE RESULT: 5.7 % — HIGH (ref 4–5.6)
A1C WITH ESTIMATED AVERAGE GLUCOSE RESULT: 5.7 % — HIGH (ref 4–5.6)
ANION GAP SERPL CALC-SCNC: 10 MMOL/L — SIGNIFICANT CHANGE UP (ref 7–14)
ANION GAP SERPL CALC-SCNC: 10 MMOL/L — SIGNIFICANT CHANGE UP (ref 7–14)
BUN SERPL-MCNC: 10 MG/DL — SIGNIFICANT CHANGE UP (ref 7–23)
BUN SERPL-MCNC: 10 MG/DL — SIGNIFICANT CHANGE UP (ref 7–23)
CALCIUM SERPL-MCNC: 8.3 MG/DL — LOW (ref 8.4–10.5)
CALCIUM SERPL-MCNC: 8.3 MG/DL — LOW (ref 8.4–10.5)
CHLORIDE SERPL-SCNC: 107 MMOL/L — SIGNIFICANT CHANGE UP (ref 98–107)
CHLORIDE SERPL-SCNC: 107 MMOL/L — SIGNIFICANT CHANGE UP (ref 98–107)
CO2 SERPL-SCNC: 22 MMOL/L — SIGNIFICANT CHANGE UP (ref 22–31)
CO2 SERPL-SCNC: 22 MMOL/L — SIGNIFICANT CHANGE UP (ref 22–31)
CREAT SERPL-MCNC: 0.62 MG/DL — SIGNIFICANT CHANGE UP (ref 0.5–1.3)
CREAT SERPL-MCNC: 0.62 MG/DL — SIGNIFICANT CHANGE UP (ref 0.5–1.3)
EGFR: 97 ML/MIN/1.73M2 — SIGNIFICANT CHANGE UP
EGFR: 97 ML/MIN/1.73M2 — SIGNIFICANT CHANGE UP
ESTIMATED AVERAGE GLUCOSE: 117 — SIGNIFICANT CHANGE UP
ESTIMATED AVERAGE GLUCOSE: 117 — SIGNIFICANT CHANGE UP
GLUCOSE BLDC GLUCOMTR-MCNC: 103 MG/DL — HIGH (ref 70–99)
GLUCOSE BLDC GLUCOMTR-MCNC: 103 MG/DL — HIGH (ref 70–99)
GLUCOSE BLDC GLUCOMTR-MCNC: 106 MG/DL — HIGH (ref 70–99)
GLUCOSE BLDC GLUCOMTR-MCNC: 106 MG/DL — HIGH (ref 70–99)
GLUCOSE SERPL-MCNC: 87 MG/DL — SIGNIFICANT CHANGE UP (ref 70–99)
GLUCOSE SERPL-MCNC: 87 MG/DL — SIGNIFICANT CHANGE UP (ref 70–99)
HCT VFR BLD CALC: 34.4 % — LOW (ref 34.5–45)
HCT VFR BLD CALC: 34.4 % — LOW (ref 34.5–45)
HGB BLD-MCNC: 11.1 G/DL — LOW (ref 11.5–15.5)
HGB BLD-MCNC: 11.1 G/DL — LOW (ref 11.5–15.5)
MCHC RBC-ENTMCNC: 29.5 PG — SIGNIFICANT CHANGE UP (ref 27–34)
MCHC RBC-ENTMCNC: 29.5 PG — SIGNIFICANT CHANGE UP (ref 27–34)
MCHC RBC-ENTMCNC: 32.3 GM/DL — SIGNIFICANT CHANGE UP (ref 32–36)
MCHC RBC-ENTMCNC: 32.3 GM/DL — SIGNIFICANT CHANGE UP (ref 32–36)
MCV RBC AUTO: 91.5 FL — SIGNIFICANT CHANGE UP (ref 80–100)
MCV RBC AUTO: 91.5 FL — SIGNIFICANT CHANGE UP (ref 80–100)
NRBC # BLD: 0 /100 WBCS — SIGNIFICANT CHANGE UP (ref 0–0)
NRBC # BLD: 0 /100 WBCS — SIGNIFICANT CHANGE UP (ref 0–0)
NRBC # FLD: 0 K/UL — SIGNIFICANT CHANGE UP (ref 0–0)
NRBC # FLD: 0 K/UL — SIGNIFICANT CHANGE UP (ref 0–0)
PLATELET # BLD AUTO: 181 K/UL — SIGNIFICANT CHANGE UP (ref 150–400)
PLATELET # BLD AUTO: 181 K/UL — SIGNIFICANT CHANGE UP (ref 150–400)
POTASSIUM SERPL-MCNC: 3.8 MMOL/L — SIGNIFICANT CHANGE UP (ref 3.5–5.3)
POTASSIUM SERPL-MCNC: 3.8 MMOL/L — SIGNIFICANT CHANGE UP (ref 3.5–5.3)
POTASSIUM SERPL-SCNC: 3.8 MMOL/L — SIGNIFICANT CHANGE UP (ref 3.5–5.3)
POTASSIUM SERPL-SCNC: 3.8 MMOL/L — SIGNIFICANT CHANGE UP (ref 3.5–5.3)
RBC # BLD: 3.76 M/UL — LOW (ref 3.8–5.2)
RBC # BLD: 3.76 M/UL — LOW (ref 3.8–5.2)
RBC # FLD: 12.6 % — SIGNIFICANT CHANGE UP (ref 10.3–14.5)
RBC # FLD: 12.6 % — SIGNIFICANT CHANGE UP (ref 10.3–14.5)
SODIUM SERPL-SCNC: 139 MMOL/L — SIGNIFICANT CHANGE UP (ref 135–145)
SODIUM SERPL-SCNC: 139 MMOL/L — SIGNIFICANT CHANGE UP (ref 135–145)
WBC # BLD: 7.21 K/UL — SIGNIFICANT CHANGE UP (ref 3.8–10.5)
WBC # BLD: 7.21 K/UL — SIGNIFICANT CHANGE UP (ref 3.8–10.5)
WBC # FLD AUTO: 7.21 K/UL — SIGNIFICANT CHANGE UP (ref 3.8–10.5)
WBC # FLD AUTO: 7.21 K/UL — SIGNIFICANT CHANGE UP (ref 3.8–10.5)

## 2024-01-10 PROCEDURE — 99223 1ST HOSP IP/OBS HIGH 75: CPT

## 2024-01-10 RX ORDER — IBUPROFEN 200 MG
1 TABLET ORAL
Qty: 12 | Refills: 0
Start: 2024-01-10

## 2024-01-10 RX ORDER — ACETAMINOPHEN 500 MG
3 TABLET ORAL
Qty: 0 | Refills: 0 | DISCHARGE
Start: 2024-01-10

## 2024-01-10 RX ADMIN — Medication 975 MILLIGRAM(S): at 01:31

## 2024-01-10 RX ADMIN — Medication 975 MILLIGRAM(S): at 13:02

## 2024-01-10 RX ADMIN — SIMETHICONE 80 MILLIGRAM(S): 80 TABLET, CHEWABLE ORAL at 05:58

## 2024-01-10 RX ADMIN — HEPARIN SODIUM 5000 UNIT(S): 5000 INJECTION INTRAVENOUS; SUBCUTANEOUS at 05:58

## 2024-01-10 RX ADMIN — Medication 975 MILLIGRAM(S): at 12:02

## 2024-01-10 RX ADMIN — Medication 975 MILLIGRAM(S): at 00:31

## 2024-01-10 RX ADMIN — HEPARIN SODIUM 5000 UNIT(S): 5000 INJECTION INTRAVENOUS; SUBCUTANEOUS at 13:30

## 2024-01-10 RX ADMIN — SIMETHICONE 80 MILLIGRAM(S): 80 TABLET, CHEWABLE ORAL at 12:02

## 2024-01-10 RX ADMIN — Medication 975 MILLIGRAM(S): at 05:58

## 2024-01-10 NOTE — PROGRESS NOTE ADULT - ATTENDING COMMENTS
Patient seen and examined, agree with gyn onc fellow and resident  POD#1  Doing well  Routine postop care  Labs stable  Exam benign

## 2024-01-10 NOTE — DISCHARGE NOTE NURSING/CASE MANAGEMENT/SOCIAL WORK - PATIENT PORTAL LINK FT
You can access the FollowMyHealth Patient Portal offered by [facility] by registering at the following website: [email or web address]. By joining [email or web address] FollowMyHealth portal, you will also be able to view your health information using other applications (apps) compatible with our system. You can access the FollowMyHealth Patient Portal offered by [facility] by registering at the following website: [email or web address]. By joining [employer]’s FollowMyHealth portal, you will also be able to view your health information using other applications (apps) compatible with our system.

## 2024-01-10 NOTE — CONSULT NOTE ADULT - TIME BILLING
Time-based billing (NON-critical care).     75 minutes spent on total encounter; more than 50% of the visit was spent counseling and / or coordinating care by the attending physician.  The necessity of the time spent during the encounter on this date of service was due to:     review of laboratory data, radiology results, consultants' recommendations, documentation in [place], discussion with patient/ACP and interdisciplinary staff (such as , social workers, etc). Interventions were performed as documented above. Time-based billing (NON-critical care).     75 minutes spent on total encounter; more than 50% of the visit was spent counseling and / or coordinating care by the attending physician.  The necessity of the time spent during the encounter on this date of service was due to:     review of laboratory data, radiology results, consultants' recommendations, documentation in [place], discussion with patient/ACP and interdisciplinary staff (such as , social workers, etc). Interventions were performed as documented above.

## 2024-01-10 NOTE — DISCHARGE NOTE PROVIDER - NSDCFUADDINST_GEN_ALL_CORE_FT
Postoperative Instructions    For pain control, take the followin. Ibuprofen 600mg every 6 hours, take with food  2. Add Tylenol 975mg every 6 hours, alternated with ibuprofen  Tylenol and ibuprofen may be obtained over the counter.    Return to your regular way of eating.     Resume normal activity as tolerated, but no heavy lifting or strenuous activity for 6 weeks. Complete vaginal rest, no tampons, no douching, no tub bathing, no sexual activities for 6 weeks unless otherwise instructed by your doctor.      Call your doctor with any signs and symptoms of infection such as fever (>100.4 F), chills, nausea or vomiting.  Call your doctor if you're unable to tolerate food or have difficulty urinating.  Call your doctor if you have pain that is not relieved by your prescribed medications. Call your doctor with redness or swelling at the incision site, fluid leakage or wound separation.    Notify your doctor with any other concerns. Follow up Dr. Bass in 2 weeks for a post-operative appointment.

## 2024-01-10 NOTE — DISCHARGE NOTE PROVIDER - HOSPITAL COURSE
Patient underwent an uncomplicated robot-assisted total laparoscopic hysterectomy, bilateral salpingo-oophorectomy, lysis of adhesions, omental biopsy, and cystoscopy (frozen = atypical hyperplasia with no carcinoma). EBL: 50. Please see operative note for details. On POD#0 pain was well controlled with oral analgesia and patient tolerated regular diet. Labs drawn on POD#1 were stable compared to pre-op. Biggs catheter was discontinued and patient voided without issues. Routine post-operative care was performed. No notable events. Hospitalist saw patient on POD#1 with recommendation to hold Telmisartan 40 mg daily and have patient follow up with her PCP to restart given normotension. Upon discharge on POD#1, the patient was ambulating, voiding spontaneously, tolerating oral intake, pain was well controlled with oral medication, and vital signs were stable. Patient was instructed to follow up with Dr. Bass in 2 weeks for a post-operative appointment.

## 2024-01-10 NOTE — DISCHARGE NOTE NURSING/CASE MANAGEMENT/SOCIAL WORK - NSDCPEFALRISK_GEN_ALL_CORE
For information on Fall & Injury Prevention, visit: [email or web address] OR  [email or web address] OR  https://www.cdc.gov/steadi/patient.html For information on Fall & Injury Prevention, visit: [email or web address] OR  [email or web address] OR  https://www.cdc.gov/steadi/patient.html

## 2024-01-10 NOTE — DISCHARGE NOTE PROVIDER - NSDCCPTREATMENT_GEN_ALL_CORE_FT
PRINCIPAL PROCEDURE  Procedure: Robot-assisted laparoscopic total hysterectomy with bilateral salpingo-oophorectomy (BSO) and cystoscopy using da Abbie Xi  Findings and Treatment:       SECONDARY PROCEDURE  Procedure: Omental biopsy  Findings and Treatment:     Procedure: Robot-assisted laparoscopic lysis of adhesions  Findings and Treatment:

## 2024-01-10 NOTE — PROGRESS NOTE ADULT - ASSESSMENT
Assessment/Plan: 67 yo female POD#1 s/p Robot-assisted total laparoscopic hysterectomy with bilateral salpingo-oophorectomy (BSO), Omental biopsy, lysis of adhesions and cystoscopy (Frozen section of uterus/cervix/bilateral adnexa demonstrated atypical hyperplasia). Pt stable and doing well post-operatively.     Neuro: Continue PO pain medication  CV: Hemodynamically stable, AM CBC stable from previous  Hx HTN - Lopressor PRN, BPs overnight stable  Pulm: Saturating well on RA. Increase incentive spirometry.  COLLINS -  overnight, O2sat 100 RA  GI: Pt tolerating regular diabetic diet   C/w Senna/Simethicone/Zofran  : Biggs in place- will d/c. Adequate UOP  Heme: Continue HSQ/Venodynes for DVT ppx. Increase OOB.    ID: Afebrile  Metabolic: BMP stable from previous  Endo: T2DM - ISS, FS, CCD  Dispo: continue inpatient care    Seen with GYN ONC team  Daquan Olmos PGY1 Assessment/Plan: 67 yo female POD#1 s/p Robot-assisted total laparoscopic hysterectomy with bilateral salpingo-oophorectomy (BSO), Omental biopsy, lysis of adhesions and cystoscopy (Frozen section of uterus/cervix/bilateral adnexa demonstrated atypical hyperplasia). Pt stable and doing well post-operatively.     Neuro: Continue PO pain medication  CV: Hemodynamically stable, AM CBC stable from previous  Hx HTN - Lopressor PRN, BPs overnight stable  Pulm: Saturating well on RA. Increase incentive spirometry.  COLLINS -  overnight, O2sat 100 RA  GI:   C/w Senna/Simethicone/Zofran  : Biggs in place- will d/c. Adequate UOP  Heme: Continue HSQ/Venodynes for DVT ppx. Increase OOB.    ID: Afebrile  Metabolic: BMP stable from previous  Endo: T2DM - ISS, FS, CCD  Dispo: continue inpatient care    Seen with GYN ONC team  Daquan Olmos PGY1 Assessment/Plan: 69 yo female POD#1 s/p Robot-assisted total laparoscopic hysterectomy with bilateral salpingo-oophorectomy (BSO), Omental biopsy, lysis of adhesions and cystoscopy (Frozen section of uterus/cervix/bilateral adnexa demonstrated atypical hyperplasia). Pt stable and doing well post-operatively.     Neuro: Continue PO pain medication  CV: Hemodynamically stable, AM CBC stable from previous  Hx HTN - Lopressor PRN, BPs overnight stable  Pulm: Saturating well on RA. Increase incentive spirometry.  COLLINS -  overnight, O2sat 100 RA  GI:   C/w Senna/Simethicone/Zofran  : Biggs in place- will d/c. Adequate UOP  Heme: Continue HSQ/Venodynes for DVT ppx. Increase OOB.    ID: Afebrile  Metabolic: BMP stable from previous  Endo: T2DM - ISS, FS, CCD  Dispo: continue inpatient care    Seen with GYN ONC team  Daquan Olmos PGY1 Assessment/Plan: 67 yo female POD#1 s/p Robot-assisted total laparoscopic hysterectomy with bilateral salpingo-oophorectomy (BSO), Omental biopsy, lysis of adhesions and cystoscopy (Frozen section of uterus/cervix/bilateral adnexa demonstrated atypical hyperplasia). Pt stable and doing well post-operatively.     Neuro: Continue PO pain medication  CV: Hemodynamically stable, AM CBC stable from previous  Hx HTN - Lopressor PRN, BPs overnight stable  Pulm: Saturating well on RA. Increase incentive spirometry.  COLLINS -  overnight, O2sat 100 RA  GI: Tolerating light PO diet, advance as tolerated  C/w Senna/Simethicone/Zofran  : Biggs in place- will discuss d/c this AM. Adequate UOP  Heme: Continue HSQ/Venodynes for DVT ppx. Increase OOB.    ID: Afebrile  Metabolic: BMP stable from previous  Endo: T2DM - ISS, FS, CCD  Dispo: continue inpatient care    Seen with GYN ONC team  Daquan Olmos PGY1 Assessment/Plan: 69 yo female POD#1 s/p Robot-assisted total laparoscopic hysterectomy with bilateral salpingo-oophorectomy (BSO), Omental biopsy, lysis of adhesions and cystoscopy (Frozen section of uterus/cervix/bilateral adnexa demonstrated atypical hyperplasia). Pt stable and doing well post-operatively.     Neuro: Continue PO pain medication  CV: Hemodynamically stable, AM CBC stable from previous  Hx HTN - Lopressor PRN, BPs overnight stable  Pulm: Saturating well on RA. Increase incentive spirometry.  COLLINS -  overnight, O2sat 100 RA  GI: Tolerating light PO diet, advance as tolerated  C/w Senna/Simethicone/Zofran  : Biggs in place- will discuss d/c this AM. Adequate UOP  Heme: Continue HSQ/Venodynes for DVT ppx. Increase OOB.    ID: Afebrile  Metabolic: BMP stable from previous  Endo: T2DM - ISS, FS, CCD  Dispo: continue inpatient care    Seen with GYN ONC team  Daquan Olmos PGY1 Assessment/Plan: 69 yo female POD#1 s/p Robot-assisted total laparoscopic hysterectomy with bilateral salpingo-oophorectomy (BSO), Omental biopsy, lysis of adhesions and cystoscopy (Frozen section of uterus/cervix/bilateral adnexa demonstrated atypical hyperplasia). Pt stable and doing well post-operatively.     Neuro: Continue PO pain medication  CV: Hemodynamically stable, AM CBC stable from previous  Hx HTN - Lopressor PRN, BPs overnight stable  Pulm: Saturating well on RA. Increase incentive spirometry.  COLLINS -  overnight, O2sat 100 RA  GI: Tolerating light PO diet, advance as tolerated  C/w Senna/Simethicone/Zofran  : Biggs in place- will discuss d/c this AM. Adequate UOP  Heme: Continue HSQ/Venodynes for DVT ppx. Increase OOB.    ID: Afebrile  Metabolic: BMP stable from previous  Endo: T2DM - ISS, FS, CCD  F/u GHOS recs  Dispo: continue inpatient care    Seen with GYN ONC team  Daquan Olmos PGY1 Assessment/Plan: 67 yo female POD#1 s/p Robot-assisted total laparoscopic hysterectomy with bilateral salpingo-oophorectomy (BSO), Omental biopsy, lysis of adhesions and cystoscopy (Frozen section of uterus/cervix/bilateral adnexa demonstrated atypical hyperplasia). Pt stable and doing well post-operatively.     Neuro: Continue PO pain medication  CV: Hemodynamically stable, AM CBC stable from previous  Hx HTN - Lopressor PRN, BPs overnight stable  Pulm: Saturating well on RA. Increase incentive spirometry.  COLLINS -  overnight, O2sat 100 RA  GI: Tolerating light PO diet, advance as tolerated  C/w Senna/Simethicone/Zofran  : Biggs in place- will discuss d/c this AM. Adequate UOP  Heme: Continue HSQ/Venodynes for DVT ppx. Increase OOB.    ID: Afebrile  Metabolic: BMP stable from previous  Endo: T2DM - ISS, FS, CCD  F/u GHOS recs  Dispo: continue inpatient care    Seen with GYN ONC team  Daquan Olmos PGY1

## 2024-01-10 NOTE — CONSULT NOTE ADULT - PROBLEM SELECTOR RECOMMENDATION 9
- s/p Robot-assisted total laparoscopic hysterectomy with bilateral salpingo-oophorectomy (BSO), Omental biopsy, lysis of adhesions and cystoscopy   - Frozen section of uterus/cervix/bilateral adnexa demonstrated atypical hyperplasia  - pain is controlled. Does not like oxycodone due to N/V/dizziness side effect.   - mgmt per primary Gyn onc team

## 2024-01-10 NOTE — DISCHARGE NOTE PROVIDER - NSDCMRMEDTOKEN_GEN_ALL_CORE_FT
acetaminophen 325 mg oral tablet: 3 tab(s) orally every 6 hours  Albuterol (Eqv-ProAir HFA) 90 mcg/inh inhalation aerosol: 2 puff(s) inhaled 2 times a day as needed for  bronchospasm  aspirin 81 mg oral delayed release tablet: 1 tab(s) orally once a day, stop 7 days before surgery  Breztri Aerosphere inhalation aerosol: 2 inhaled as needed for  bronchospasm  Calcium 1000mg - Magnesium 400 mg - Zinc 25 mg po supplement daily:   famotidine 20 mg oral tablet: 1 orally once a day  ibuprofen 600 mg oral tablet: 1 tab(s) orally every 6 hours as needed for  moderate pain  ipratropium 42 mcg/inh (0.06%) nasal spray: 2 spray(s) intranasally 2 times a day as needed for  allergy symptoms  metFORMIN 500 mg oral tablet: 1 orally once a day  montelukast 10 mg oral tablet: 1 orally once a day  telmisartan 40 mg oral tablet: 1 tab(s) orally once a day  Vitamin D3 25 mcg (1000 intl units) oral tablet: 1 tab(s) orally once a day

## 2024-01-10 NOTE — CONSULT NOTE ADULT - PROBLEM SELECTOR RECOMMENDATION 2
- patient to continue montelukast, loratadine, Breztri inhaler standing, and PRN albuterol regimen as prescribed by pulmonologist   - f/u with OP pulm

## 2024-01-10 NOTE — DISCHARGE NOTE PROVIDER - NSDCCPCAREPLAN_GEN_ALL_CORE_FT
PRINCIPAL DISCHARGE DIAGNOSIS  Diagnosis: Endometrial thickening on ultrasound  Assessment and Plan of Treatment:

## 2024-01-10 NOTE — DISCHARGE NOTE NURSING/CASE MANAGEMENT/SOCIAL WORK - NSDCPNINST_GEN_ALL_CORE
Please return to ED if you develop any nausea, vomiting, diarrhea or temp of 100.4 and greater. Notify the provider if you develop any pus like drainage from incision sites or increased level of pain unrelieved with ordered pain meds.

## 2024-01-10 NOTE — CONSULT NOTE ADULT - PROBLEM SELECTOR RECOMMENDATION 6
DVT ppx: per primary team, restart aspirin ppx as per primary   GI ppx: continue famotidine 20 mg qhs    No objection to DC with PCP, pulm follow up

## 2024-01-10 NOTE — PROGRESS NOTE ADULT - PROBLEM SELECTOR PLAN 1
Gyn Onc Fellow Addendum:    Pt seen and examined at bedside. Agree with above.    VS reviewed  Labs reviewed    Hemodynamically stable   Continue current pain regimen  Reg diet   Biggs removed this AM, f/u TOV   Encourage ambulation and IS use  Replete lytes prn, no significant derangements noted   DVT ppx: HSQ, Venodynes   Dispo: anticipate discharge home today     Layne Newberry MD

## 2024-01-10 NOTE — CONSULT NOTE ADULT - SUBJECTIVE AND OBJECTIVE BOX
CHIEF COMPLAINT: Patient is a 68y old Female who presents with a chief complaint of scheduled surgery     HPI:   67 yo female with PMH pre-diabetes, HTN, HLD, long COVID, presenting for scheduled surgery. POD#1 of Robot-assisted total laparoscopic hysterectomy with bilateral salpingo-oophorectomy (BSO), Omental biopsy, lysis of adhesions and cystoscopy (Frozen section of uterus/cervix/bilateral adnexa demonstrated atypical hyperplasia).     Pt stable and doing well post-operatively.   Passing gas, no Bm yet.   Otherwise, has some sharp surgical site pain that worsens with movement, but understands it will improve with time.   A1c reviewed 5.7, improving per patient, takes metformin 500 daily   BPs stable off BP meds   No other complaints or concerns, asking to go home     Allergies    hydrocodone (Other)  Percocet 5/325 (Other)    Intolerances        HOME MEDICATIONS: [x] Reviewed    MEDICATIONS  (STANDING):  acetaminophen     Tablet .. 975 milliGRAM(s) Oral every 6 hours  chlorhexidine 2% Cloths 1 Application(s) Topical once  dextrose 5%. 1000 milliLiter(s) (100 mL/Hr) IV Continuous <Continuous>  dextrose 5%. 1000 milliLiter(s) (50 mL/Hr) IV Continuous <Continuous>  dextrose 50% Injectable 25 Gram(s) IV Push once  dextrose 50% Injectable 12.5 Gram(s) IV Push once  dextrose 50% Injectable 25 Gram(s) IV Push once  glucagon  Injectable 1 milliGRAM(s) IntraMuscular once  heparin   Injectable 5000 Unit(s) SubCutaneous every 8 hours  insulin lispro (ADMELOG) corrective regimen sliding scale   SubCutaneous three times a day before meals  insulin lispro (ADMELOG) corrective regimen sliding scale   SubCutaneous at bedtime  metoprolol tartrate Injectable 5 milliGRAM(s) IV Push every 6 hours    MEDICATIONS  (PRN):  dextrose Oral Gel 15 Gram(s) Oral once PRN Blood Glucose LESS THAN 70 milliGRAM(s)/deciliter  ibuprofen  Tablet. 600 milliGRAM(s) Oral every 6 hours PRN Mild Pain (1 - 3)  ondansetron    Tablet 8 milliGRAM(s) Oral every 8 hours PRN Nausea and/or Vomiting  senna 1 Tablet(s) Oral at bedtime PRN Constipation  simethicone 80 milliGRAM(s) Chew every 6 hours PRN Gas      PAST MEDICAL & SURGICAL HISTORY:  Hypertension      Hyperlipidemia      Vitamin D deficiency      Obstructive sleep apnea  with CPAP      Asthma      Pre-existing type 2 diabetes mellitus      Long COVID      Renal calculi      Endometrial thickening on ultrasound      S/P  section  x 2      H/O colonoscopy      History of hysteroscopy      [ ] Reviewed     SOCIAL HISTORY:  [x] Substance abuse: denies   [x] Tobacco: no sig use   [x] Alcohol use: denies     FAMILY HISTORY:  [x] No pertinent family history in first degree relatives     REVIEW OF SYSTEMS:    [x] All other ROS negative  [  ] Unable to obtain due to poor mental status    Vital Signs Last 24 Hrs  T(C): 36.7 (10 Aquilino 2024 09:56), Max: 37.2 (2024 22:13)  T(F): 98 (10 Aquilino 2024 09:56), Max: 98.9 (2024 22:13)  HR: 78 (10 Aquilino 2024 09:56) (56 - 80)  BP: 123/63 (10 Aquilino 2024 09:56) (108/61 - 144/67)  BP(mean): 62 (2024 16:30) (62 - 88)  RR: 17 (10 Aquilino 2024 09:56) (12 - 19)  SpO2: 100% (10 Aquilino 2024 09:56) (95% - 100%)    Parameters below as of 10 Aquilino 2024 09:56  Patient On (Oxygen Delivery Method): room air        PHYSICAL EXAM:    GENERAL: NAD, well-groomed, well-developed  HEAD:  Atraumatic, Normocephalic  EYES: EOMI, PERRLA, conjunctiva and sclera clear  ENMT: Moist mucous membranes  NECK: Supple, No JVD  RESPIRATORY: Clear to auscultation bilaterally; No rales, rhonchi, wheezing, or rubs  CARDIOVASCULAR: Regular rate and rhythm; No murmurs, rubs, or gallops  GASTROINTESTINAL: Soft, Nontender, Nondistended; Bowel sounds present  GENITOURINARY: Not examined  EXTREMITIES:  2+ Peripheral Pulses, No clubbing, cyanosis, or edema  NERVOUS SYSTEM:  Alert & Oriented X3; Moving all 4 extremities; No gross sensory deficits  HEME/LYMPH: No lymphadenopathy noted  SKIN: No rashes or lesions; Incisions C/D/I    LABS:                        11.1   7.21  )-----------( 181      ( 10 Aquilino 2024 05:35 )             34.4     01-10    139  |  107  |  10  ----------------------------<  87  3.8   |  22  |  0.62    Ca    8.3<L>      10 Aquilino 2024 05:35        Urinalysis Basic - ( 10 Aquilino 2024 05:35 )    Color: x / Appearance: x / SG: x / pH: x  Gluc: 87 mg/dL / Ketone: x  / Bili: x / Urobili: x   Blood: x / Protein: x / Nitrite: x   Leuk Esterase: x / RBC: x / WBC x   Sq Epi: x / Non Sq Epi: x / Bacteria: x      CAPILLARY BLOOD GLUCOSE      POCT Blood Glucose.: 106 mg/dL (10 Aquilino 2024 08:37)      RADIOLOGY & ADDITIONAL STUDIES:              [X ] Consultant(s) Notes Reviewed  [x] Care Discussed with Consultants/Other Providers: Surgical PA - discussed

## 2024-01-10 NOTE — DISCHARGE NOTE PROVIDER - CARE PROVIDER_API CALL
Rishi Bass  Gynecologic Oncology  [address] [place] [id number]  Phone: [phone]  Fax: [phone]  Follow Up Time: 2 weeks   Rishi Bass  Gynecologic Oncology  [address] [place] [id number]  Phone: [phone]  Fax: [phone]  Follow Up Time: 2 weeks

## 2024-01-10 NOTE — CONSULT NOTE ADULT - ASSESSMENT
67 yo female with PMH pre-diabetes, HTN, HLD, long COVID, presenting for scheduled surgery. POD#1 of Robot-assisted total laparoscopic hysterectomy with bilateral salpingo-oophorectomy (BSO), Omental biopsy, lysis of adhesions and cystoscopy (Frozen section of uterus/cervix/bilateral adnexa demonstrated atypical hyperplasia).

## 2024-01-10 NOTE — CONSULT NOTE ADULT - PROBLEM SELECTOR RECOMMENDATION 3
- patient on Telmisartan 40 mg daily   - would have her follow up with pcp to restart given normotension currently

## 2024-01-10 NOTE — DISCHARGE NOTE PROVIDER - NSDCFUSCHEDAPPT_GEN_ALL_CORE_FT
Sofia Turcios  [facility]  [facility]M [address] [facility]  Scheduled Appointment: 02/01/2024    [facility] [address] [facility]  Scheduled Appointment: 03/27/2024    Balta Verma  [facility] [address] [facility]  Scheduled Appointment: 03/27/2024     Sofia Turcios  [facility]  [facility] [address] [facility]  Scheduled Appointment: 02/01/2024    [facility] [address] [facility]  Scheduled Appointment: 03/27/2024    Balta Verma  [facility] [address] [facility]  Scheduled Appointment: 03/27/2024

## 2024-01-10 NOTE — DISCHARGE NOTE PROVIDER - NPI NUMBER (FOR SYSADMIN USE ONLY) :
[[id number]] [[id number]] Siliq Counseling:  I discussed with the patient the risks of Siliq including but not limited to new or worsening depression, suicidal thoughts and behavior, immunosuppression, malignancy, posterior leukoencephalopathy syndrome, and serious infections.  The patient understands that monitoring is required including a PPD at baseline and must alert us or the primary physician if symptoms of infection or other concerning signs are noted. There is also a special program designed to monitor depression which is required with Siliq.

## 2024-01-10 NOTE — DISCHARGE NOTE PROVIDER - CARE PROVIDERS DIRECT ADDRESSES
[email or web address]

## 2024-01-11 ENCOUNTER — NON-APPOINTMENT (OUTPATIENT)
Age: 69
End: 2024-01-11

## 2024-01-11 LAB
NON-GYNECOLOGICAL CYTOLOGY STUDY: SIGNIFICANT CHANGE UP
NON-GYNECOLOGICAL CYTOLOGY STUDY: SIGNIFICANT CHANGE UP

## 2024-01-12 ENCOUNTER — RX RENEWAL (OUTPATIENT)
Age: 69
End: 2024-01-12

## 2024-01-12 RX ORDER — PANTOPRAZOLE 40 MG/1
40 TABLET, DELAYED RELEASE ORAL
Qty: 90 | Refills: 1 | Status: ACTIVE | COMMUNITY
Start: 2022-02-28 | End: 1900-01-01

## 2024-01-17 ENCOUNTER — APPOINTMENT (OUTPATIENT)
Dept: DERMATOLOGY | Facility: CLINIC | Age: 69
End: 2024-01-17
Payer: COMMERCIAL

## 2024-01-17 PROCEDURE — 99214 OFFICE O/P EST MOD 30 MIN: CPT

## 2024-01-17 RX ORDER — TRIAMCINOLONE ACETONIDE 1 MG/G
0.1 OINTMENT TOPICAL
Qty: 1 | Refills: 1 | Status: ACTIVE | COMMUNITY
Start: 2023-09-19 | End: 1900-01-01

## 2024-01-17 RX ORDER — KETOCONAZOLE 20 MG/G
2 CREAM TOPICAL
Qty: 1 | Refills: 5 | Status: ACTIVE | COMMUNITY
Start: 2024-01-17 | End: 1900-01-01

## 2024-01-17 NOTE — HISTORY OF PRESENT ILLNESS
[FreeTextEntry1] : rash under breasts and pannus [de-identified] : 68 year old female. rash under breasts and pannus. tried clobetasol.

## 2024-01-17 NOTE — ASSESSMENT
[FreeTextEntry1] : rash intertrigo mix triamcinolone 0.1% cream with ketoconazole cream BID x2 weeks; SED  SKs, cherry angiomas discussed cosmetic remioval may lead to scars; defer tx

## 2024-01-22 ENCOUNTER — APPOINTMENT (OUTPATIENT)
Dept: GYNECOLOGIC ONCOLOGY | Facility: CLINIC | Age: 69
End: 2024-01-22

## 2024-01-22 ENCOUNTER — APPOINTMENT (OUTPATIENT)
Dept: UROLOGY | Facility: CLINIC | Age: 69
End: 2024-01-22

## 2024-01-22 PROBLEM — N84.0 ENDOMETRIAL POLYP: Status: ACTIVE | Noted: 2023-09-27

## 2024-01-24 LAB — SURGICAL PATHOLOGY STUDY: SIGNIFICANT CHANGE UP

## 2024-01-29 ENCOUNTER — APPOINTMENT (OUTPATIENT)
Dept: GYNECOLOGIC ONCOLOGY | Facility: CLINIC | Age: 69
End: 2024-01-29
Payer: COMMERCIAL

## 2024-01-29 ENCOUNTER — NON-APPOINTMENT (OUTPATIENT)
Age: 69
End: 2024-01-29

## 2024-01-29 DIAGNOSIS — N85.02 ENDOMETRIAL INTRAEPITHELIAL NEOPLASIA [EIN]: ICD-10-CM

## 2024-01-29 DIAGNOSIS — R92.30 DENSE BREASTS, UNSPECIFIED: ICD-10-CM

## 2024-01-29 DIAGNOSIS — N84.0 POLYP OF CORPUS UTERI: ICD-10-CM

## 2024-01-29 DIAGNOSIS — Z12.31 ENCOUNTER FOR SCREENING MAMMOGRAM FOR MALIGNANT NEOPLASM OF BREAST: ICD-10-CM

## 2024-01-29 PROCEDURE — 99024 POSTOP FOLLOW-UP VISIT: CPT

## 2024-01-30 NOTE — DISCUSSION/SUMMARY
[Erythema] : was not erythematous [Ecchymosis] : was not ecchymotic [None] : had no drainage [Normal Skin] : normal appearance [Doing Well] : is doing well [FreeTextEntry1] : Strips remain on [FreeTextEntry9] : s/nt/nd; no CVAT; no peritoneal signs [de-identified] : cuff intact; no blood or discharge seen

## 2024-01-30 NOTE — REASON FOR VISIT
[de-identified] : 1/9/24 [de-identified] : R-A TLH, BSO, omental biopsy, cystoscopy [de-identified] : She reports no vaginal bleeding, vaginal discharge or fever.  She reports intermittent mild abdominal discomfort.  She has no GI or  concerns.

## 2024-01-30 NOTE — ASSESSMENT
[FreeTextEntry1] : She is recuperating well and we discussed her path; a copy was provided to her adjuvant therapy is not recommended.  We discussed the etiology of her discomfort which likely represents postsurgical healing particularly given the abdominal wall lysis of adhesions.  Her instructions were discussed, including to report any further discomfort. All Q/A. She'll RTO 3 months, sooner as needed.  DM and MS tasked.

## 2024-02-01 ENCOUNTER — NON-APPOINTMENT (OUTPATIENT)
Age: 69
End: 2024-02-01

## 2024-02-01 ENCOUNTER — APPOINTMENT (OUTPATIENT)
Dept: DERMATOLOGY | Facility: CLINIC | Age: 69
End: 2024-02-01
Payer: COMMERCIAL

## 2024-02-01 ENCOUNTER — APPOINTMENT (OUTPATIENT)
Dept: OPHTHALMOLOGY | Facility: CLINIC | Age: 69
End: 2024-02-01
Payer: COMMERCIAL

## 2024-02-01 DIAGNOSIS — L82.1 OTHER SEBORRHEIC KERATOSIS: ICD-10-CM

## 2024-02-01 DIAGNOSIS — D18.01 HEMANGIOMA OF SKIN AND SUBCUTANEOUS TISSUE: ICD-10-CM

## 2024-02-01 PROCEDURE — 92133 CPTRZD OPH DX IMG PST SGM ON: CPT

## 2024-02-01 PROCEDURE — 99214 OFFICE O/P EST MOD 30 MIN: CPT

## 2024-02-01 PROCEDURE — 92014 COMPRE OPH EXAM EST PT 1/>: CPT

## 2024-02-01 RX ORDER — DESOXIMETASONE 0.5 MG/G
0.05 OINTMENT TOPICAL
Qty: 1 | Refills: 2 | Status: ACTIVE | COMMUNITY
Start: 2024-02-01 | End: 1900-01-01

## 2024-02-01 RX ORDER — TACROLIMUS 1 MG/G
0.1 OINTMENT TOPICAL
Qty: 1 | Refills: 3 | Status: ACTIVE | COMMUNITY
Start: 2024-02-01 | End: 1900-01-01

## 2024-02-01 NOTE — PHYSICAL EXAM
[FreeTextEntry3] : Erythematous shiny smooth patches, discontinuous, b/l inframammary fold Pannus clear  Scattered, well-demarcated stuck-on appearing brown plaques and papules on the face and trunk   Several scattered, red, partially blanching papules on the trunk

## 2024-02-01 NOTE — HISTORY OF PRESENT ILLNESS
[FreeTextEntry1] : F/u: rash under breasts and pannus [de-identified] : 68-year-old female last seen 2 weeks ago by Dr. Yoon, here to f/u for rash under breasts and pannus. Tried clobetasol.  Started mixing triamcinolone 0.1% ointment with ketoconazole cream at last visit.  Triamcinolone helped her itching for 1 week, but then she did not feel like it was helpful, so she stopped (~1.5 weeks of continuous use). She has continued to apply Ketoconazole.  Itching persists under the breasts, no longer in the pannus.  She had a hysterectomy on 1/09/2024, and notes that all of her symptoms started after.  Wearing an abdominal brace daily for compression post-surgery.

## 2024-02-01 NOTE — END OF VISIT
[Time Spent: ___ minutes] : I have spent [unfilled] minutes of time on the encounter. [TextEntry] : Physician Assistant Statement: Case was discussed and supervised by attending physician.

## 2024-02-01 NOTE — ASSESSMENT
[FreeTextEntry1] : 1. Intertrigo, chronic -- Inframammary fold, improved but not at treatment goal -- Pannus, resolved. -- Suspect flaring 2/2 to prolonged immobilization (hysterectomy, omental bx, cystoscopy on 1/09/2024), followed by continuous use of abdominal brace day & night post-surgery.  - Education, counseling.  - Discussed etiology and recommendation for avoiding friction.  - Patient stopped TAC at least 6 days ago.  - Will switch to another group 3 topical steroid, desoximetasone cream, apply to affected areas BID x 2 weeks max. SED including atrophy, dyspigmentation, telangiectasias, striae. Proper use reviewed including only using to affected area and avoidance of prolonged use.    - Start barrier cream daily (Aquaphor Fast Relief Diaper Healing Paste (40% zinc oxide)) recommended at least BID, more frequently as needed for barrier protection with abdominal wrap.  - For itch relief throughout the day, start tacrolimus 0.1% ointment to affected areas as often as needed. SED. - Once rash resolves, keep area dry, can use powder to prevent recurrences (OTC Zeasorb AF powder or nystatin powder)  2. SKs, cherry angiomas - discussed cosmetic removal may lead to scars; defer tx  RTC 2 weeks to f/u with Dr. Yoon.

## 2024-02-03 ENCOUNTER — APPOINTMENT (OUTPATIENT)
Dept: CARDIOLOGY | Facility: CLINIC | Age: 69
End: 2024-02-03
Payer: COMMERCIAL

## 2024-02-03 PROCEDURE — 93306 TTE W/DOPPLER COMPLETE: CPT

## 2024-02-05 ENCOUNTER — NON-APPOINTMENT (OUTPATIENT)
Age: 69
End: 2024-02-05

## 2024-02-07 ENCOUNTER — NON-APPOINTMENT (OUTPATIENT)
Age: 69
End: 2024-02-07

## 2024-02-09 ENCOUNTER — NON-APPOINTMENT (OUTPATIENT)
Age: 69
End: 2024-02-09

## 2024-02-14 ENCOUNTER — APPOINTMENT (OUTPATIENT)
Dept: DERMATOLOGY | Facility: CLINIC | Age: 69
End: 2024-02-14

## 2024-02-15 ENCOUNTER — APPOINTMENT (OUTPATIENT)
Dept: ULTRASOUND IMAGING | Facility: CLINIC | Age: 69
End: 2024-02-15
Payer: COMMERCIAL

## 2024-02-15 ENCOUNTER — RESULT REVIEW (OUTPATIENT)
Age: 69
End: 2024-02-15

## 2024-02-15 ENCOUNTER — OUTPATIENT (OUTPATIENT)
Dept: OUTPATIENT SERVICES | Facility: HOSPITAL | Age: 69
LOS: 1 days | End: 2024-02-15
Payer: COMMERCIAL

## 2024-02-15 ENCOUNTER — APPOINTMENT (OUTPATIENT)
Dept: DERMATOLOGY | Facility: CLINIC | Age: 69
End: 2024-02-15
Payer: COMMERCIAL

## 2024-02-15 ENCOUNTER — APPOINTMENT (OUTPATIENT)
Dept: MAMMOGRAPHY | Facility: CLINIC | Age: 69
End: 2024-02-15
Payer: COMMERCIAL

## 2024-02-15 DIAGNOSIS — D22.9 MELANOCYTIC NEVI, UNSPECIFIED: ICD-10-CM

## 2024-02-15 DIAGNOSIS — L81.4 OTHER MELANIN HYPERPIGMENTATION: ICD-10-CM

## 2024-02-15 DIAGNOSIS — L30.9 DERMATITIS, UNSPECIFIED: ICD-10-CM

## 2024-02-15 DIAGNOSIS — L73.9 FOLLICULAR DISORDER, UNSPECIFIED: ICD-10-CM

## 2024-02-15 DIAGNOSIS — L30.4 ERYTHEMA INTERTRIGO: ICD-10-CM

## 2024-02-15 DIAGNOSIS — Z12.31 ENCOUNTER FOR SCREENING MAMMOGRAM FOR MALIGNANT NEOPLASM OF BREAST: ICD-10-CM

## 2024-02-15 DIAGNOSIS — Z98.890 OTHER SPECIFIED POSTPROCEDURAL STATES: Chronic | ICD-10-CM

## 2024-02-15 DIAGNOSIS — R92.30 DENSE BREASTS, UNSPECIFIED: ICD-10-CM

## 2024-02-15 DIAGNOSIS — L24.9 IRRITANT CONTACT DERMATITIS, UNSPECIFIED CAUSE: ICD-10-CM

## 2024-02-15 DIAGNOSIS — Z98.891 HISTORY OF UTERINE SCAR FROM PREVIOUS SURGERY: Chronic | ICD-10-CM

## 2024-02-15 PROCEDURE — 99214 OFFICE O/P EST MOD 30 MIN: CPT

## 2024-02-15 PROCEDURE — 77063 BREAST TOMOSYNTHESIS BI: CPT

## 2024-02-15 PROCEDURE — 76641 ULTRASOUND BREAST COMPLETE: CPT | Mod: 26,50

## 2024-02-15 PROCEDURE — 77063 BREAST TOMOSYNTHESIS BI: CPT | Mod: 26

## 2024-02-15 PROCEDURE — 77067 SCR MAMMO BI INCL CAD: CPT | Mod: 26

## 2024-02-15 PROCEDURE — 76641 ULTRASOUND BREAST COMPLETE: CPT

## 2024-02-15 PROCEDURE — 77067 SCR MAMMO BI INCL CAD: CPT

## 2024-02-15 RX ORDER — FLUCONAZOLE 200 MG/1
200 TABLET ORAL
Qty: 4 | Refills: 0 | Status: ACTIVE | COMMUNITY
Start: 2024-02-15 | End: 1900-01-01

## 2024-02-15 NOTE — ASSESSMENT
[FreeTextEntry1] : 1. Intertrigo, chronic -- Inframammary fold, improved but not at treatment goal -- Pannus, resolved. -- Suspect flaring 2/2 to prolonged immobilization (hysterectomy, omental bx, cystoscopy on 1/09/2024), followed by continuous use of abdominal brace day & night post-surgery.  - Unable to tolerate desoximetasone or tacrolimus due to stinging.  - Education, counseling.  - Start diflucan 200 mg PO daily x 4 days. SED.  - C/w ketoconazole 2% cream to affected area BID.  - Can continue with TAC 0.1% ointment PRN pruritus.  - C/w barrier cream daily (Aquaphor Fast Relief Diaper Healing Paste (40% zinc oxide)) recommended at least BID, more frequently as needed for barrier protection with abdominal wrap.  - Once rash resolves, keep area dry, can use powder to prevent recurrences (OTC Zeasorb AF powder or nystatin powder)  RTC 2 weeks to f/u with Dr. Yoon.

## 2024-02-15 NOTE — HISTORY OF PRESENT ILLNESS
[FreeTextEntry1] : F/u: rash under breasts and pannus [de-identified] : 68-year-old female last seen 2 weeks ago, here to f/u for rash under breasts and pannus.  Initially started TAC 0.1% ointment which helped her itching for 1 week but stopped after 1.5 weeks of continuous use after she felt like it stopped helping her.  Tried clobetasol, desoxymetasone, and tacrolimus. They made her burn more.  Just using Ketoconazole 2% cream and Aquaphor 40% zinc oxide for several days helped her. She added in TAC 0.1% ointment. However, missed 1 day of TAC four days ago, and ever since then, bright red rash has returned underneath her breasts. Now itching here and in pannus.  She had a hysterectomy on 1/09/2024, and notes that all of her symptoms started after.  Wearing an abdominal brace daily for compression post-surgery. 2 days ago, stopped wearing brace.

## 2024-02-15 NOTE — HISTORY OF PRESENT ILLNESS
[FreeTextEntry1] : F/u: rash under breasts and pannus [de-identified] : 68-year-old female last seen 2 weeks ago, here to f/u for rash under breasts and pannus.  Initially started TAC 0.1% ointment which helped her itching for 1 week but stopped after 1.5 weeks of continuous use after she felt like it stopped helping her.  Tried clobetasol, desoxymetasone, and tacrolimus. They made her burn more.  Just using Ketoconazole 2% cream and Aquaphor 40% zinc oxide for several days helped her. She added in TAC 0.1% ointment. However, missed 1 day of TAC four days ago, and ever since then, bright red rash has returned underneath her breasts. Now itching here and in pannus.  She had a hysterectomy on 1/09/2024, and notes that all of her symptoms started after.  Wearing an abdominal brace daily for compression post-surgery. 2 days ago, stopped wearing brace.

## 2024-02-15 NOTE — PHYSICAL EXAM
[FreeTextEntry3] : Erythematous bright red shiny smooth patches, discontinuous, b/l inframammary fold, no annularity, no satellitosis, with dark brown smooth surrounding patch  Pannus with some dark brown smooth patches in the lateral aspects

## 2024-02-26 ENCOUNTER — APPOINTMENT (OUTPATIENT)
Dept: ORTHOPEDIC SURGERY | Facility: CLINIC | Age: 69
End: 2024-02-26
Payer: COMMERCIAL

## 2024-02-26 DIAGNOSIS — S92.352A DISPLACED FRACTURE OF FIFTH METATARSAL BONE, LEFT FOOT, INITIAL ENCOUNTER FOR CLOSED FRACTURE: ICD-10-CM

## 2024-02-26 DIAGNOSIS — M25.50 PAIN IN UNSPECIFIED JOINT: ICD-10-CM

## 2024-02-26 PROCEDURE — 99213 OFFICE O/P EST LOW 20 MIN: CPT

## 2024-02-26 PROCEDURE — 73630 X-RAY EXAM OF FOOT: CPT | Mod: LT

## 2024-02-26 NOTE — PHYSICAL EXAM
[de-identified] : Left foot Physical Examination:  General: Alert and oriented x3.  In no acute distress.  Pleasant in nature with a normal affect.  No apparent respiratory distress.  Erythema, Warmth, Rubor: Negative Swelling: Negative  ROM Ankle: 1. Dorsiflexion: 10 degrees 2. Plantarflexion: 40 degrees 3. Inversion: 30 degrees 4. Eversion: 20 degrees  ROM of digits: Normal  Pes Planus: Negative Pes Cavus: Negative  Bunion: Negative Tailor's Bunion (Bunionette): Negative Hammer Toe Deformity/Deformities: Negative  Tenderness to Palpation:  1. Heel Pain: Negative 2. Midfoot Pain: Negative 3. First MTP Joint: Negative 4. Lis Franc Joint: Negative  Tenderness Metatarsals: 1st MT: Negative 2nd MT: Negative 3rd MT: Negative 4th MT: Negative 5th MT: Negative Base of the 5th MT: Negative  Ligament Pain: 1. Lis Franc Ligament: Negative 2. Plantar Fascia Ligament: Negative  Strength:  5/5 TA/GS/EHL/FHL/EDL/ADD/ABD  Pulses: 2+ DP/PT Pulses  Capillary Refill Toes: <2 seconds  Neuro: Intact motor and sensory throughout  Additional Test: 1. Cutler's Squeeze Test: Negative 2. Calcaneal Squeeze Test: Negative [de-identified] : 3V of the left foot were ordered, obtained and reviewed by me today, 2/26/24, and revealed: completely healed fracture of the left base of fifth metatarsal fracture. Arthritis noted.

## 2024-02-26 NOTE — HISTORY OF PRESENT ILLNESS
[FreeTextEntry1] : 2/26/2024: The patient is a 68-year-old female who presents for f/u evaluation of her left base of fifth metatarsal fracture. She reports some continued pain over the fifth metatarsal. She presents wearing flexible sneakers today in the office.  9/11/23: MAKSIM CASIANO is a 67 year old female who presents for follow up evaluation of her left base of fifth metatarsal fracture. She states that her pain has remained the same since the previous office visit. She does complain of intermittent cramping of her foot during the night. She presents today wearing sandals and is ambulating without assistance.   6/14/23: The patient is a 67-year-old female who presents for follow-up of her left foot base of fifth metatarsal fracture. She reports that she is feeling good with improved symptoms since her last visit. She has no pain. She has been compliant with physical therapy. She presents to the office in sneakers and ambulating without assistance.   5/4/2023: The patient is a 67 year old female presenting for a follow-up visit of her left foot, base of fifth metatarsal fracture. The patient states that her symptoms have improved since her previous visit. She presents today wearing a CAM boot and is ambulating without assistance. No other complaints.   3/29/2023: The patient is a 67 year old female who presents with her  for a follow-up visit of a left foot base of fifth metatarsal fracture. She states that her symptoms have improved since her previous visit. She still has some discomfort over the fracture sight.  She presents wearing a CAM boot and is ambulating without assistance. She is currently taking 81MG of Aspirin for DVT prophylaxis. The patient denies fevers, chills, night sweats, shortness of breath, calf pain.  No other complaints.  3/8/2023: The patient is a 67-year-old female who presents with a left foot base of fifth metatarsal fracture that she sustained 2 weeks ago while in the [facility] she accidentally rolled her foot suffering the fracture.  She presents with a short cam walking boot and states that the pain has subsided quite a bit since the initial injury.  She did fly back with the boot on from the [facility] approximately a 14-hour direct flight and she is on aspirin 81 mg daily.  The patient denies fevers, chills, night sweats, shortness of breath, calf pain.  No other complaints.

## 2024-02-26 NOTE — DISCUSSION/SUMMARY
[de-identified] : Today I had a lengthy discussion with the patient regarding their left foot fracture. I have addressed all the patient's concerns surrounding the pathology of their condition. I have reviewed the patient's XR imaging with them in great detail. The patient's persistent pain seems to be due to her arthritis.   I recommend that the patient utilize Voltaren gel topically. A prescription for the Voltaren gel was ordered for the patient today. If the Voltaren gel could not be obtained, Icy Hot, Biofreeze, or Bengay can be utilized instead.  The patient understood and verbally agreed to the treatment plan. All of their questions were answered, and they were satisfied with the visit. The patient should call the office if they have any questions or experience worsening symptoms.   Follow up on an as needed basis.

## 2024-02-26 NOTE — ADDENDUM
[FreeTextEntry1] : Documented by Josi Hernandez acting as a scribe for Dr. Hall on 02/26/2024. All medical record entries made by the Scribe were at my, Dr. Hall's, direction and personally dictated by me on 02/26/2024. I have reviewed the chart and agree that the record accurately reflects my personal performance of the history, physical exam, procedure and imaging.

## 2024-03-04 ENCOUNTER — RX RENEWAL (OUTPATIENT)
Age: 69
End: 2024-03-04

## 2024-03-05 DIAGNOSIS — Z87.09 PERSONAL HISTORY OF OTHER DISEASES OF THE RESPIRATORY SYSTEM: ICD-10-CM

## 2024-03-05 DIAGNOSIS — J01.90 ACUTE SINUSITIS, UNSPECIFIED: ICD-10-CM

## 2024-03-05 RX ORDER — AMOXICILLIN AND CLAVULANATE POTASSIUM 875; 125 MG/1; MG/1
875-125 TABLET, COATED ORAL
Qty: 14 | Refills: 0 | Status: ACTIVE | COMMUNITY
Start: 2024-03-05 | End: 1900-01-01

## 2024-03-10 ENCOUNTER — NON-APPOINTMENT (OUTPATIENT)
Age: 69
End: 2024-03-10

## 2024-03-19 ENCOUNTER — APPOINTMENT (OUTPATIENT)
Dept: OBGYN | Facility: CLINIC | Age: 69
End: 2024-03-19
Payer: MEDICARE

## 2024-03-19 VITALS
HEIGHT: 63 IN | DIASTOLIC BLOOD PRESSURE: 78 MMHG | BODY MASS INDEX: 29.41 KG/M2 | WEIGHT: 166 LBS | SYSTOLIC BLOOD PRESSURE: 125 MMHG

## 2024-03-19 DIAGNOSIS — Z01.419 ENCOUNTER FOR GYNECOLOGICAL EXAMINATION (GENERAL) (ROUTINE) W/OUT ABNORMAL FINDINGS: ICD-10-CM

## 2024-03-19 PROCEDURE — 82270 OCCULT BLOOD FECES: CPT

## 2024-03-19 PROCEDURE — G0101: CPT

## 2024-03-19 PROCEDURE — 99397 PER PM REEVAL EST PAT 65+ YR: CPT | Mod: GY

## 2024-03-19 NOTE — HISTORY OF PRESENT ILLNESS
[FreeTextEntry1] : 2024. MAKSIM CREUS 68 year old female  LMP PM. She presents for an annual gyn exam. PMH: endometroid adenoma carcinoma FIGO 1, HTN, asthma, Sleep apnea, eczema, uterine ca.   She denies abdominal and pelvic pain, no vaginal discharge or vaginitis symptoms. She reports normal urination, no dysuria, no incontinence of urine. BM is normal per patient, no blood in stool or constipation/diarrhea.  Pathology report: endometroid adenoma carcinoma FIGO 1, in the background of complex atypical hyperplasia  GynHx: endometroid adenoma carcinoma FIGO 1, endometrial polyps. No Hx of STI, fibroids, ovarian cysts, abnl paps, or pelvic infections. PMH: HTN, asthma, Sleep apnea, eczema Meds: Telmisartan, Rosuvastatin, Desloratadine, Metformin, Montelukast, Famotidine, GERD, vitamin D OBHx: : G1 FT c/s , G2 FT c/s  SHx: , Hysterectomy, Salpingectomy, Oophorectomy, RA TLHBSO, Omentum biopsy, cystoscopy () w/ Dr. Bass.  FHx: Father: Leukemia. Denies FHx of breast, ovarian, uterine, colon, pancreatic, prostate cancer. All: Opioids (dizziness, nausea) Social: No alcohol, drug, or tobacco use, non-smoker.  Vaccine: covid vaccines     [TextBox_4] : pelvic sono (2023): endometrial lininmm, nml ovaries  (2023): failed sonohysterogram  [Patient reported colonoscopy was normal] : Patient reported colonoscopy was normal [BreastSonogramDate] : 2/2024 [Mammogramdate] : 2/2024 [TextBox_19] : BIRADS 2 [PapSmeardate] : 2023 [TextBox_25] : BIRADS 2  [BoneDensityDate] : 2023 [TextBox_31] : NILM, HPV not detected.  [TextBox_37] : osteopenia, Frax wnl [ColonoscopyDate] : 9/2023 [No] : Patient does not have concerns regarding sex [Currently Active] : currently active

## 2024-03-19 NOTE — PHYSICAL EXAM
[Chaperone Present] : A chaperone was present in the examining room during all aspects of the physical examination [Alert] : alert [Appropriately responsive] : appropriately responsive [No Lymphadenopathy] : no lymphadenopathy [No Acute Distress] : no acute distress [Regular Rate Rhythm] : regular rate rhythm [Clear to Auscultation B/L] : clear to auscultation bilaterally [No Murmurs] : no murmurs [Soft] : soft [Non-tender] : non-tender [Non-distended] : non-distended [No Lesions] : no lesions [No HSM] : No HSM [No Mass] : no mass [Oriented x3] : oriented x3 [Examination Of The Breasts] : a normal appearance [No Masses] : no breast masses were palpable [Vulvar Atrophy] : vulvar atrophy [Labia Minora] : normal [Labia Majora] : normal [Normal] : normal [Atrophy] : atrophy [Uterine Adnexae] : absent [Absent] : absent [FreeTextEntry9] : Guaiac test negative, no masses noted

## 2024-03-19 NOTE — PLAN
[FreeTextEntry1] : Health Maintenance: BSA, calcium, vitamin D and exercise d/w pt No pap smear - vaginal exam  Rx given for mammogram and breast sonogram Bone density - UTD Repeat in 2025 Advised pt to see PCP and dermatologist annually  s/p RA THOR, BSO Omental biopsy, Cystoscopy: Pt has folllow-up w/ Dr. Bass in 1 month.  D/w pt to wait one more month to return to intercourse. Advised pt to use OTC lubricants (Ky jelly, Astroglide, coconut oil). d/w pt all R/B/A.  Osteopenia: D/w pt increased calcium intake in diet and Vit D 1000 U intake, & weight-bearing exercises (i.e. walking) for 30 min daily  RTO PRN or for annual gyn exam

## 2024-04-15 ENCOUNTER — APPOINTMENT (OUTPATIENT)
Dept: INTERNAL MEDICINE | Facility: CLINIC | Age: 69
End: 2024-04-15

## 2024-04-22 PROBLEM — C54.1 ENDOMETRIAL CANCER: Status: ACTIVE | Noted: 2024-01-30

## 2024-04-25 NOTE — ED ADULT NURSE NOTE - IS THE PATIENT ABLE TO BE SCREENED?
Gen: awake and responsive. Eyes: anicteric, normal lids. Mouth: covered with mask. Nose: covered with mask. Hearing: intact grossly. Neck: trachea midline and no jvd. CV: RRR no s3. Lungs: clear. No wheezes, Abd: Soft, nabs, nr, NT. No hsm. Ext: no sig edema, some palm erythema. Neuro: moves all 4 ext grossly. No asterixis. Skin: no pruritis and some palm erythema. Yes

## 2024-04-26 NOTE — ASSESSMENT
----- Message from Lilibeth Escudero DO sent at 8/20/2023  4:08 PM EDT -----  Please call the patient regarding her abnormal result. inform needs addn studies [FreeTextEntry1] : Goal blood pressure /90 At goal Not at goal Urged to limit sodium intake Urged to maintain diet and exercise habits to keep BMI < 28 Limit alcohol consumption to < 7 drinks per week Reinforced adherence to medication regimen

## 2024-04-26 NOTE — HISTORY OF PRESENT ILLNESS
[de-identified] : Ms. CASIANO comes in for reassessment of hypertension. She denies edema, chest pain, dizziness, EASON, PND and orthopnea.  She  has had no problems with her medications.

## 2024-04-29 ENCOUNTER — APPOINTMENT (OUTPATIENT)
Dept: GYNECOLOGIC ONCOLOGY | Facility: CLINIC | Age: 69
End: 2024-04-29
Payer: COMMERCIAL

## 2024-04-29 VITALS — TEMPERATURE: 97.3 F

## 2024-04-29 VITALS — HEIGHT: 63 IN | SYSTOLIC BLOOD PRESSURE: 118 MMHG | DIASTOLIC BLOOD PRESSURE: 73 MMHG | HEART RATE: 85 BPM

## 2024-04-29 DIAGNOSIS — C54.1 MALIGNANT NEOPLASM OF ENDOMETRIUM: ICD-10-CM

## 2024-04-29 PROCEDURE — 99213 OFFICE O/P EST LOW 20 MIN: CPT

## 2024-04-29 NOTE — REASON FOR VISIT
[Post Op] : post op visit [de-identified] : 1/9/24 [de-identified] : R-A TLH, BSO, omental biopsy, cystoscopy [de-identified] : She reports no vaginal bleeding, vaginal discharge or fever.  She returned last week from the [facility] where she was visiting her mother after an MI; her mother passed away yesterday, and she is going back tomorrow. She has no GI or  concerns.

## 2024-04-29 NOTE — DISCUSSION/SUMMARY
[Clean] : was clean [Dry] : was dry [Intact] : was intact [None] : had no drainage [Normal Skin] : normal appearance [Doing Well] : is doing well [FreeTextEntry1] : Strips remain on [Erythema] : was not erythematous [Ecchymosis] : was not ecchymotic [FreeTextEntry9] : s/nt/nd; no CVAT; no peritoneal signs [de-identified] : cuff intact; no blood or discharge seen; scant suture material seen.

## 2024-04-29 NOTE — ASSESSMENT
[FreeTextEntry1] : She is recuperating well and we discussed her path and the MMR results. We disc her inquiry re the interpretation, which I explained as well as noting she could pursue genetics if she wished. She also asked re a PET, which I feel is not indicated at this time.  Her instructions were discussed.  All Q/A. She'll RTO 3 months, sooner as needed.  We disc her travel instructions, part w/r/t DVT precautions. We also disc her Med f/u.

## 2024-05-02 ENCOUNTER — APPOINTMENT (OUTPATIENT)
Dept: ORTHOPEDIC SURGERY | Facility: CLINIC | Age: 69
End: 2024-05-02

## 2024-05-10 ENCOUNTER — APPOINTMENT (OUTPATIENT)
Dept: INTERNAL MEDICINE | Facility: CLINIC | Age: 69
End: 2024-05-10

## 2024-05-10 ENCOUNTER — APPOINTMENT (OUTPATIENT)
Dept: PULMONOLOGY | Facility: CLINIC | Age: 69
End: 2024-05-10

## 2024-05-21 ENCOUNTER — APPOINTMENT (OUTPATIENT)
Dept: ORTHOPEDIC SURGERY | Facility: CLINIC | Age: 69
End: 2024-05-21
Payer: COMMERCIAL

## 2024-05-21 DIAGNOSIS — M75.81 OTHER SHOULDER LESIONS, RIGHT SHOULDER: ICD-10-CM

## 2024-05-21 PROCEDURE — 99214 OFFICE O/P EST MOD 30 MIN: CPT

## 2024-05-21 RX ORDER — MELOXICAM 15 MG/1
15 TABLET ORAL
Qty: 21 | Refills: 2 | Status: ACTIVE | COMMUNITY
Start: 2024-05-21 | End: 1900-01-01

## 2024-05-21 NOTE — HISTORY OF PRESENT ILLNESS
[de-identified] : MAKSIM is a 68 year female here today for right shoulder pain. She has had this pain for several months, with symptoms worse at night. She received a cortisone injection that provided her several months of relief.

## 2024-05-21 NOTE — CONSULT LETTER
[Dear  ___] : Dear  [unfilled], [Consult Letter:] : I had the pleasure of evaluating your patient, [unfilled]. [Please see my note below.] : Please see my note below. [Sincerely,] : Sincerely, [FreeTextEntry3] : Dr. Darius Quinonez

## 2024-05-21 NOTE — ADDENDUM
[FreeTextEntry1] : Documented by Josi Hernandez acting as a scribe for Dr. Quinonez on 05/21/2024. All medical record entries made by the Scribe were at my, Dr. Quinonez's, direction and personally dictated by me on 05/21/2024. I have reviewed the chart and agree that the record accurately reflects my personal performance of the history, physical exam, procedure and imaging.

## 2024-05-21 NOTE — PHYSICAL EXAM
[de-identified] : General: Well appearing, no acute distress Neurologic: A&Ox3, No focal deficits Head: NCAT without abrasions, lacerations, or ecchymosis to head, face, or scalp Eyes: No scleral icterus, no gross abnormalities Respiratory: Equal chest wall expansion bilaterally, no accessory muscle use Lymphatic: No lymphadenopathy palpated Skin: Warm and dry Psychiatric: Normal mood and affect   Right Shoulder:  Inspection/Palpation: no tenderness, swelling or deformities  Range of Motion: no crepitus; FF 0-110; ER at side 75; IR to L1  Strength: forward elevation in scapular plane [5]/5, internal rotation [5]/5, external rotation [5]/5, adduction [5]/5 and abduction 4/5 with hitching at the shoulder  Stability: load and shift test negative, apprehension test negative, relocation test negative, sulcus sign negative, Peggy test negative, Jerk test negative  Tests: Rosado negative, Neer's test's test negative, drop arm test negative, bear hug test negative, Huffman sign negative, cross arm adduction negative, lift off sign negative, Hornblower's sign negative, speeds test negative, Yergason's test negative, bicipital groove tenderness negative, North's Active Compression test negative, whipple test negative, bicep's load II test negative [de-identified] : No new imaging today

## 2024-05-21 NOTE — DISCUSSION/SUMMARY
[de-identified] : We had a thorough discussion regarding the nature of her pain, the pathophysiology, as well as all treatment options. A prescription of Meloxicam was given to be taken as directed with food to prevent GI upset, if occurs pt to D/C and call us at that time. Patient was given prescription of formal physical therapy that she will perform 2x/wk for 6-8 wks. Considering the patient's current presentation of pain, physical exam, and radiographs an MRI is indicated at this time. A prescription for this was given to the patient. We will go over the MRI results with her upon obtaining the results in the office and advise her of further treatment options. She agrees with the above plan and all questions were answered.

## 2024-05-31 ENCOUNTER — APPOINTMENT (OUTPATIENT)
Dept: MRI IMAGING | Facility: CLINIC | Age: 69
End: 2024-05-31

## 2024-06-06 DIAGNOSIS — I10 ESSENTIAL (PRIMARY) HYPERTENSION: ICD-10-CM

## 2024-06-18 LAB
ALBUMIN SERPL ELPH-MCNC: 4.3 G/DL
ALP BLD-CCNC: 87 U/L
ALT SERPL-CCNC: 32 U/L
ANION GAP SERPL CALC-SCNC: 10 MMOL/L
AST SERPL-CCNC: 23 U/L
BILIRUB SERPL-MCNC: 0.3 MG/DL
BUN SERPL-MCNC: 18 MG/DL
CALCIUM SERPL-MCNC: 8.9 MG/DL
CHLORIDE SERPL-SCNC: 105 MMOL/L
CHOLEST SERPL-MCNC: 188 MG/DL
CO2 SERPL-SCNC: 26 MMOL/L
CREAT SERPL-MCNC: 0.63 MG/DL
CREAT SPEC-SCNC: 65 MG/DL
EGFR: 97 ML/MIN/1.73M2
ESTIMATED AVERAGE GLUCOSE: 126 MG/DL
GLUCOSE SERPL-MCNC: 105 MG/DL
HBA1C MFR BLD HPLC: 6 %
HCT VFR BLD CALC: 41.6 %
HDLC SERPL-MCNC: 60 MG/DL
HGB BLD-MCNC: 13.4 G/DL
LDLC SERPL CALC-MCNC: 106 MG/DL
MCHC RBC-ENTMCNC: 29.5 PG
MCHC RBC-ENTMCNC: 32.2 GM/DL
MCV RBC AUTO: 91.6 FL
MICROALBUMIN 24H UR DL<=1MG/L-MCNC: 6.1 MG/DL
MICROALBUMIN/CREAT 24H UR-RTO: 94 MG/G
NONHDLC SERPL-MCNC: 128 MG/DL
PLATELET # BLD AUTO: 237 K/UL
POTASSIUM SERPL-SCNC: 4.4 MMOL/L
PROT SERPL-MCNC: 7.1 G/DL
RBC # BLD: 4.54 M/UL
RBC # FLD: 12.6 %
SODIUM SERPL-SCNC: 141 MMOL/L
TRIGL SERPL-MCNC: 126 MG/DL
WBC # FLD AUTO: 4.95 K/UL

## 2024-06-20 ENCOUNTER — OUTPATIENT (OUTPATIENT)
Dept: OUTPATIENT SERVICES | Facility: HOSPITAL | Age: 69
LOS: 1 days | End: 2024-06-20
Payer: COMMERCIAL

## 2024-06-20 ENCOUNTER — APPOINTMENT (OUTPATIENT)
Dept: INTERNAL MEDICINE | Facility: CLINIC | Age: 69
End: 2024-06-20
Payer: COMMERCIAL

## 2024-06-20 VITALS — RESPIRATION RATE: 14 BRPM | DIASTOLIC BLOOD PRESSURE: 62 MMHG | HEART RATE: 74 BPM | SYSTOLIC BLOOD PRESSURE: 122 MMHG

## 2024-06-20 DIAGNOSIS — I10 ESSENTIAL (PRIMARY) HYPERTENSION: ICD-10-CM

## 2024-06-20 DIAGNOSIS — Z98.891 HISTORY OF UTERINE SCAR FROM PREVIOUS SURGERY: Chronic | ICD-10-CM

## 2024-06-20 DIAGNOSIS — Z98.890 OTHER SPECIFIED POSTPROCEDURAL STATES: Chronic | ICD-10-CM

## 2024-06-20 PROCEDURE — G0463: CPT

## 2024-06-20 PROCEDURE — 99214 OFFICE O/P EST MOD 30 MIN: CPT

## 2024-06-20 NOTE — HISTORY OF PRESENT ILLNESS
[de-identified] : Ms. CASIANO comes in for reassessment of hypertension, diabetes mellitus  and hyperlipidemia.   Her  FS at home in range of . There have been  no hypoglycemic reactions.  Has been doing well with low fat, calorie restricted diet. Limits alcohol. Moderate exercise at least 150 minutes per week   She denies edema, chest pain, dizziness, EASON, PND and orthopnea.  She  denies foot lesions and dysthesias.   Denies visual problems,  nocturia and  weight change.  She  has had no problems with her medications.

## 2024-06-20 NOTE — ASSESSMENT
[FreeTextEntry1] : Goal blood pressure /90 At goal Urged to limit sodium intake     Urged to limit diet  and fat and cholesterol  intake Urged to maintain diet and exercise habits to keep BMI < 28 Limit alcohol consumption to < 7drinks per week Reinforced adherence to medication regimen  Goal A1c LT 7%  At goal    Limit carbohydrates in diet Exercise 5-6 days per week Advocated dilated retinal exam yearly Proper foot care reviewed  Recent lab results reviewed.

## 2024-06-23 LAB
APPEARANCE: CLEAR
BACTERIA UR CULT: NORMAL
BACTERIA: NEGATIVE /HPF
BILIRUBIN URINE: NEGATIVE
BLOOD URINE: ABNORMAL
CAST: 0 /LPF
COLOR: YELLOW
EPITHELIAL CELLS: 0 /HPF
GLUCOSE QUALITATIVE U: NEGATIVE MG/DL
KETONES URINE: NEGATIVE MG/DL
LEUKOCYTE ESTERASE URINE: ABNORMAL
MICROSCOPIC-UA: NORMAL
NITRITE URINE: NEGATIVE
PH URINE: 7
PROTEIN URINE: NEGATIVE MG/DL
RED BLOOD CELLS URINE: 1 /HPF
REVIEW: NORMAL
SPECIFIC GRAVITY URINE: 1.01
UROBILINOGEN URINE: 0.2 MG/DL
WHITE BLOOD CELLS URINE: 7 /HPF

## 2024-06-24 DIAGNOSIS — E11.9 TYPE 2 DIABETES MELLITUS WITHOUT COMPLICATIONS: ICD-10-CM

## 2024-06-24 DIAGNOSIS — E78.5 HYPERLIPIDEMIA, UNSPECIFIED: ICD-10-CM

## 2024-07-01 ENCOUNTER — APPOINTMENT (OUTPATIENT)
Dept: UROLOGY | Facility: CLINIC | Age: 69
End: 2024-07-01
Payer: COMMERCIAL

## 2024-07-01 ENCOUNTER — OUTPATIENT (OUTPATIENT)
Dept: OUTPATIENT SERVICES | Facility: HOSPITAL | Age: 69
LOS: 1 days | End: 2024-07-01
Payer: COMMERCIAL

## 2024-07-01 DIAGNOSIS — Z98.891 HISTORY OF UTERINE SCAR FROM PREVIOUS SURGERY: Chronic | ICD-10-CM

## 2024-07-01 DIAGNOSIS — N20.1 CALCULUS OF URETER: ICD-10-CM

## 2024-07-01 DIAGNOSIS — Z98.890 OTHER SPECIFIED POSTPROCEDURAL STATES: Chronic | ICD-10-CM

## 2024-07-01 DIAGNOSIS — R35.0 FREQUENCY OF MICTURITION: ICD-10-CM

## 2024-07-01 PROCEDURE — 76775 US EXAM ABDO BACK WALL LIM: CPT | Mod: 26

## 2024-07-01 PROCEDURE — G2211 COMPLEX E/M VISIT ADD ON: CPT

## 2024-07-01 PROCEDURE — 99214 OFFICE O/P EST MOD 30 MIN: CPT

## 2024-07-01 PROCEDURE — 76775 US EXAM ABDO BACK WALL LIM: CPT

## 2024-07-02 DIAGNOSIS — N20.1 CALCULUS OF URETER: ICD-10-CM

## 2024-07-02 LAB
APPEARANCE: ABNORMAL
BACTERIA: NEGATIVE /HPF
BILIRUBIN URINE: NEGATIVE
BLOOD URINE: ABNORMAL
CAST: 1 /LPF
COLOR: YELLOW
EPITHELIAL CELLS: 0 /HPF
GLUCOSE QUALITATIVE U: NEGATIVE MG/DL
KETONES URINE: NEGATIVE MG/DL
LEUKOCYTE ESTERASE URINE: NEGATIVE
MICROSCOPIC-UA: NORMAL
NITRITE URINE: NEGATIVE
PH URINE: 7.5
PROTEIN URINE: NORMAL MG/DL
RED BLOOD CELLS URINE: 23 /HPF
SPECIFIC GRAVITY URINE: 1.02
UROBILINOGEN URINE: 0.2 MG/DL
WHITE BLOOD CELLS URINE: 4 /HPF

## 2024-07-02 NOTE — ED ADULT NURSE NOTE - CCCP TRG CHIEF CMPLNT
To be completed in Nursing note:    Please reference list for forms that require a visit for completion.  Please remind patients that providers are given 3-5 business days to complete and return forms.      Form type:  Active Style     Date form received: 24    Date form completed by Physician:  24    How was form returned to patient (mailed, faxed, or at  for patient to ):  faxed to [phone]    Date form mailed/faxed/left at  for patient and sent to HIM for [email or web address]      Once form is left for patient, faxed, or mailed PCS will then close the documentation only encounter.      difficulty breathing

## 2024-07-03 LAB
BACTERIA UR CULT: NORMAL
URINE CYTOLOGY: NORMAL

## 2024-07-05 ENCOUNTER — OUTPATIENT (OUTPATIENT)
Dept: OUTPATIENT SERVICES | Facility: HOSPITAL | Age: 69
LOS: 1 days | End: 2024-07-05
Payer: COMMERCIAL

## 2024-07-05 ENCOUNTER — APPOINTMENT (OUTPATIENT)
Dept: RADIOLOGY | Facility: CLINIC | Age: 69
End: 2024-07-05
Payer: COMMERCIAL

## 2024-07-05 DIAGNOSIS — Z98.890 OTHER SPECIFIED POSTPROCEDURAL STATES: Chronic | ICD-10-CM

## 2024-07-05 DIAGNOSIS — N20.1 CALCULUS OF URETER: ICD-10-CM

## 2024-07-05 DIAGNOSIS — Z98.891 HISTORY OF UTERINE SCAR FROM PREVIOUS SURGERY: Chronic | ICD-10-CM

## 2024-07-05 PROCEDURE — 74018 RADEX ABDOMEN 1 VIEW: CPT | Mod: 26

## 2024-07-05 PROCEDURE — 74018 RADEX ABDOMEN 1 VIEW: CPT

## 2024-07-08 ENCOUNTER — APPOINTMENT (OUTPATIENT)
Dept: PULMONOLOGY | Facility: CLINIC | Age: 69
End: 2024-07-08
Payer: COMMERCIAL

## 2024-07-08 VITALS
SYSTOLIC BLOOD PRESSURE: 120 MMHG | HEART RATE: 83 BPM | OXYGEN SATURATION: 97 % | RESPIRATION RATE: 16 BRPM | BODY MASS INDEX: 29.05 KG/M2 | HEIGHT: 63.5 IN | DIASTOLIC BLOOD PRESSURE: 66 MMHG | TEMPERATURE: 97.9 F | WEIGHT: 166 LBS

## 2024-07-08 DIAGNOSIS — R93.89 ABNORMAL FINDINGS ON DIAGNOSTIC IMAGING OF OTHER SPECIFIED BODY STRUCTURES: ICD-10-CM

## 2024-07-08 DIAGNOSIS — J45.909 UNSPECIFIED ASTHMA, UNCOMPLICATED: ICD-10-CM

## 2024-07-08 DIAGNOSIS — L50.0 ALLERGIC URTICARIA: ICD-10-CM

## 2024-07-08 DIAGNOSIS — J30.0 VASOMOTOR RHINITIS: ICD-10-CM

## 2024-07-08 DIAGNOSIS — R06.02 SHORTNESS OF BREATH: ICD-10-CM

## 2024-07-08 DIAGNOSIS — J30.9 ALLERGIC RHINITIS, UNSPECIFIED: ICD-10-CM

## 2024-07-08 DIAGNOSIS — L30.9 DERMATITIS, UNSPECIFIED: ICD-10-CM

## 2024-07-08 DIAGNOSIS — J45.40 MODERATE PERSISTENT ASTHMA, UNCOMPLICATED: ICD-10-CM

## 2024-07-08 DIAGNOSIS — K21.9 GASTRO-ESOPHAGEAL REFLUX DISEASE W/OUT ESOPHAGITIS: ICD-10-CM

## 2024-07-08 DIAGNOSIS — E66.9 OBESITY, UNSPECIFIED: ICD-10-CM

## 2024-07-08 PROCEDURE — 99214 OFFICE O/P EST MOD 30 MIN: CPT | Mod: 25

## 2024-07-08 PROCEDURE — 94010 BREATHING CAPACITY TEST: CPT

## 2024-07-08 PROCEDURE — 95012 NITRIC OXIDE EXP GAS DETER: CPT

## 2024-07-08 PROCEDURE — 94727 GAS DIL/WSHOT DETER LNG VOL: CPT

## 2024-07-08 PROCEDURE — 94729 DIFFUSING CAPACITY: CPT

## 2024-07-08 RX ORDER — INHALER, ASSIST DEVICES
SPACER (EA) MISCELLANEOUS
Qty: 1 | Refills: 2 | Status: ACTIVE | COMMUNITY
Start: 2024-07-08 | End: 1900-01-01

## 2024-07-09 ENCOUNTER — APPOINTMENT (OUTPATIENT)
Dept: ORTHOPEDIC SURGERY | Facility: CLINIC | Age: 69
End: 2024-07-09
Payer: COMMERCIAL

## 2024-07-09 DIAGNOSIS — M75.81 OTHER SHOULDER LESIONS, RIGHT SHOULDER: ICD-10-CM

## 2024-07-09 PROCEDURE — 99214 OFFICE O/P EST MOD 30 MIN: CPT

## 2024-07-25 ENCOUNTER — APPOINTMENT (OUTPATIENT)
Dept: MRI IMAGING | Facility: CLINIC | Age: 69
End: 2024-07-25
Payer: COMMERCIAL

## 2024-07-25 ENCOUNTER — APPOINTMENT (OUTPATIENT)
Dept: UROLOGY | Facility: CLINIC | Age: 69
End: 2024-07-25
Payer: COMMERCIAL

## 2024-07-25 DIAGNOSIS — N20.0 CALCULUS OF KIDNEY: ICD-10-CM

## 2024-07-25 DIAGNOSIS — L81.4 OTHER MELANIN HYPERPIGMENTATION: ICD-10-CM

## 2024-07-25 PROCEDURE — 99214 OFFICE O/P EST MOD 30 MIN: CPT

## 2024-07-26 PROBLEM — N20.0 KIDNEY STONE ON LEFT SIDE: Status: ACTIVE | Noted: 2024-07-25

## 2024-07-26 LAB
ANION GAP SERPL CALC-SCNC: 11 MMOL/L
BACTERIA UR CULT: NORMAL
BUN SERPL-MCNC: 11 MG/DL
CALCIUM SERPL-MCNC: 9.3 MG/DL
CHLORIDE SERPL-SCNC: 104 MMOL/L
CO2 SERPL-SCNC: 25 MMOL/L
CREAT SERPL-MCNC: 0.64 MG/DL
EGFR: 96 ML/MIN/1.73M2
GLUCOSE SERPL-MCNC: 112 MG/DL
HCT VFR BLD CALC: 45.2 %
HGB BLD-MCNC: 13.8 G/DL
MCHC RBC-ENTMCNC: 28.9 PG
MCHC RBC-ENTMCNC: 30.5 GM/DL
MCV RBC AUTO: 94.6 FL
PLATELET # BLD AUTO: 253 K/UL
POTASSIUM SERPL-SCNC: 4.6 MMOL/L
RBC # BLD: 4.78 M/UL
RBC # FLD: 13 %
SODIUM SERPL-SCNC: 140 MMOL/L
WBC # FLD AUTO: 4.8 K/UL

## 2024-07-26 PROCEDURE — 73221 MRI JOINT UPR EXTREM W/O DYE: CPT | Mod: 26,RT,MH

## 2024-07-26 NOTE — HISTORY OF PRESENT ILLNESS
[FreeTextEntry1] : Follow-up kidney stone.  Patient has a large 1 cm kidney stone November 2023 on CT scan.  KUB demonstrated progression of her stone to 1.5 cm.  Patient has not meet treatment goal.  Plan: Pyeloscopy, with lithotripsy for steerable vacuum aspiration of kidney stones   Please refer to URO Consult Note.

## 2024-07-26 NOTE — ADDENDUM
[FreeTextEntry1] : Entered by Herson Young, acting as scribe for Dr. Manuel Oleary. The documentation recorded by the scribe accurately reflects the service I personally performed and the decisions made by me.

## 2024-07-26 NOTE — LETTER BODY
[FreeTextEntry1] : Curtis Manning  [facility] [address] [place] [address] [phone]  Dear Dr. Manning,  Reason for visit: Previous right ureteral stone. Left renal stone.  This is a 68 year-old woman with previous gross hematuria, previous right ureteral stone, and left renal stone. Her CT scan in Sept 2020 demonstrated a 3 mm nonobstructing left renal stone without evidence of a right ureteral stone or hydronephrosis. Her renal ultrasound in March 2021 demonstrated a stable small left renal stone. Her CT scan in November 2023 demonstrated a large 1 cm left kidney stone. Patient has a stable 1 cm renal cyst. Her ultrasound in June 2024 demonstrated progression of her kidney stone to 1.5 cm. The patient returns today for follow-up. Since she was last seen, patient obtained KUB which confirmed progression of her stone to 1.5 cm. The patient denies any urinary complaints or difficulties. The patient denies flank pain or hematuria. She is entirely asymptomatic. The patient denies any changes in health. All other review of systems are negative. Past medical history, family history and social history were unchanged. Medications and allergies were reviewed. She has no known allergies to medication.  On examination, the patient is a healthy-appearing woman in no acute distress. She is alert and oriented follows commands. She has normal mood and affect. She is normocephalic. Neck is supple. Oral no thrush. Respirations are unlabored. Abdomen is soft and nontender. Bladder is nonpalpable. No CVA tenderness. Neurologically she is grossly intact. No peripheral edema. Skin without gross abnormality.     Assessment: Previous gross hematuria. Left renal stone,.  I counseled the patient. KUB demonstrated that her kidney stone has increased in size to 1.5 cm, previously 1 cm. The patient has not met treatment goal. I recommended the patient undergo pyeloscopy, with lithotripsy for steerable vacuum aspiration of kidney stones. I counseled the patient regarding the procedure. The risks and benefits were discussed. Alternatives were given. I answered the patient's questions. The patient will take the necessary preparations for the procedure, including preop labs with  activated partial  thromboplastin time, BMP, CBC w/ DIFF, culture, prothrombin TIME and INR. The patient will follow-up as directed and will contact me with any questions or concerns. Thank you for the opportunity to participate in the care of Ms. CASIANO. I will keep you updated on her progress.  Plan: Pyeloscopy, with lithotripsy for steerable vacuum aspiration of kidney stones . Preop labs. Follow up as directed.  I spent 30-minutes time today on all issues related to this patient on today date of service including non face to face time.

## 2024-07-26 NOTE — LETTER BODY
[FreeTextEntry1] : Curtis Manning  [facility] [address] [place] [address] [phone]  Dear Dr. Manning,  Reason for visit: Previous right ureteral stone. Left renal stone.  This is a 68 year-old woman with previous gross hematuria, previous right ureteral stone, and left renal stone. Her CT scan in Sept 2020 demonstrated a 3 mm nonobstructing left renal stone without evidence of a right ureteral stone or hydronephrosis. Her renal ultrasound in March 2021 demonstrated a stable small left renal stone. Her CT scan in November 2023 demonstrated a large 1 cm left kidney stone. Patient has a stable 1 cm renal cyst. Her ultrasound in June 2024 demonstrated progression of her kidney stone to 1.5 cm. The patient returns today for follow-up. Since she was last seen, patient obtained KUB which confirmed progression of her stone to 1.5 cm. The patient denies any urinary complaints or difficulties. The patient denies flank pain or hematuria. She is entirely asymptomatic. The patient denies any changes in health. All other review of systems are negative. Past medical history, family history and social history were unchanged. Medications and allergies were reviewed. She has no known allergies to medication.  On examination, the patient is a healthy-appearing woman in no acute distress. She is alert and oriented follows commands. She has normal mood and affect. She is normocephalic. Neck is supple. Oral no thrush. Respirations are unlabored. Abdomen is soft and nontender. Bladder is nonpalpable. No CVA tenderness. Neurologically she is grossly intact. No peripheral edema. Skin without gross abnormality.     Assessment: Previous gross hematuria. Left renal stone,.  I counseled the patient. KUB demonstrated that her kidney stone has increased in size to 1.5 cm, previously 1 cm. The patient has not met treatment goal. I recommended the patient undergo pyeloscopy, with lithotripsy for steerable vacuum aspiration of kidney stones. I counseled the patient regarding the procedure. The risks and benefits were discussed. Alternatives were given. I answered the patient's questions. The patient will take the necessary preparations for the procedure, including preop labs with  activated partial  thromboplastin time, BMP, CBC w/ DIFF, culture, prothrombin TIME and INR. The patient will follow-up as directed and will contact me with any questions or concerns. Thank you for the opportunity to participate in the care of Ms. CASIANO. I will keep you updated on her progress.  Plan: Pyeloscopy, with lithotripsy for steerable vacuum aspiration of kidney stones . Preop labs. Follow up as directed.  I spent 30-minutes time today on all issues related to this patient on today date of service including non face to face time.

## 2024-07-31 ENCOUNTER — OUTPATIENT (OUTPATIENT)
Dept: OUTPATIENT SERVICES | Facility: HOSPITAL | Age: 69
LOS: 1 days | End: 2024-07-31
Payer: COMMERCIAL

## 2024-07-31 VITALS
DIASTOLIC BLOOD PRESSURE: 84 MMHG | SYSTOLIC BLOOD PRESSURE: 127 MMHG | HEIGHT: 63 IN | TEMPERATURE: 99 F | HEART RATE: 81 BPM | OXYGEN SATURATION: 98 % | RESPIRATION RATE: 20 BRPM | WEIGHT: 171.96 LBS

## 2024-07-31 DIAGNOSIS — Z98.890 OTHER SPECIFIED POSTPROCEDURAL STATES: Chronic | ICD-10-CM

## 2024-07-31 DIAGNOSIS — Z90.710 ACQUIRED ABSENCE OF BOTH CERVIX AND UTERUS: Chronic | ICD-10-CM

## 2024-07-31 DIAGNOSIS — I10 ESSENTIAL (PRIMARY) HYPERTENSION: ICD-10-CM

## 2024-07-31 DIAGNOSIS — N20.0 CALCULUS OF KIDNEY: ICD-10-CM

## 2024-07-31 DIAGNOSIS — E11.9 TYPE 2 DIABETES MELLITUS WITHOUT COMPLICATIONS: ICD-10-CM

## 2024-07-31 DIAGNOSIS — Z98.891 HISTORY OF UTERINE SCAR FROM PREVIOUS SURGERY: Chronic | ICD-10-CM

## 2024-07-31 DIAGNOSIS — Z01.818 ENCOUNTER FOR OTHER PREPROCEDURAL EXAMINATION: ICD-10-CM

## 2024-07-31 DIAGNOSIS — L81.4 OTHER MELANIN HYPERPIGMENTATION: ICD-10-CM

## 2024-07-31 PROCEDURE — G0463: CPT

## 2024-07-31 RX ORDER — LIDOCAINE HCL 20 MG/ML
0.2 VIAL (ML) INJECTION ONCE
Refills: 0 | Status: DISCONTINUED | OUTPATIENT
Start: 2024-08-21 | End: 2024-09-04

## 2024-07-31 NOTE — H&P PST ADULT - ATTENDING COMMENTS
Patient with large 1.5 cm left renal calculus. Patient declined PCNL. Patient wishes to proceed with left ureteroscopy, laser lithotripsy, stone extraction, steerable vacuum aspiration of kidney stones and stent placement. Informed consent was obtained. Alternatives were given. Risks including but not limited to bleeding, infection, risk of anesthesia, pain, failure to cure, negative ureteroscopy, stone migration, need for future procedure, and injury to surrounding structures were discussed. Patient wishes to proceed with procedure. Patient with large 1.5 cm left renal calculus.  Patient declined PCNL. Patient wishes to proceed with left ureteroscopy, laser lithotripsy, stone extraction, steerable vacuum aspiration of kidney stones and stent placement. Informed consent was obtained. Alternatives were given. Risks including but not limited to bleeding, infection, risk of anesthesia, pain, failure to cure, negative ureteroscopy, stone migration, need for future procedure, and injury to surrounding structures were discussed. Patient wishes to proceed with procedure.

## 2024-07-31 NOTE — H&P PST ADULT - PROBLEM SELECTOR PLAN 1
Left ureteroscopy, Laser lithotripsy, Stone extraction, Steerable vaccuum aspiration of Kidney stones , Left stent placement on 8/21/2024.

## 2024-07-31 NOTE — H&P PST ADULT - NSICDXPASTSURGICALHX_GEN_ALL_CORE_FT
PAST SURGICAL HISTORY:  H/O colonoscopy     History of hysteroscopy     S/P  section x 2    S/P hysterectomy     S/P laser iridotomy

## 2024-07-31 NOTE — H&P PST ADULT - ASSESSMENT
Airway:  normal    Mallampati-       Dental: Patient denies loose teeth    Activity :  dasi mets :

## 2024-07-31 NOTE — H&P PST ADULT - HISTORY OF PRESENT ILLNESS
68 year-old woman with history of HTN , Asthma , COLLINS on CPAP , Type 2 DM, Uterine Ca - s/p hysterectomy , Kidney stones - CT scan in Sept 2020 demonstrated a 3 mm nonobstructive  left renal stone without evidence of a right ureteral stone or hydronephrosis. Her renal ultrasound in March 2021 demonstrated a stable small left renal stone. Her CT scan in November 2023 demonstrated a large 1 cm left kidney stone. Patient has a stable 1 cm renal cyst. Her ultrasound in June 2024 demonstrated progression of her kidney stone to 1.5 cm.  KUB which confirmed progression of her stone to 1.5 cm. Now coming in for Left ureteroscopy, Laser lithotripsy, Stone extraction, Steerable vaccuum aspiration of Kidney stones , Left stent placement on 8/21/2024.

## 2024-07-31 NOTE — H&P PST ADULT - NSICDXPASTMEDICALHX_GEN_ALL_CORE_FT
PAST MEDICAL HISTORY:  Asthma     Hyperlipidemia     Hypertension     Long COVID     COLLINS on CPAP     Renal calculi     Type 2 diabetes mellitus     Uterine cancer     Vitamin D deficiency

## 2024-08-01 ENCOUNTER — NON-APPOINTMENT (OUTPATIENT)
Age: 69
End: 2024-08-01

## 2024-08-01 ENCOUNTER — APPOINTMENT (OUTPATIENT)
Dept: ORTHOPEDIC SURGERY | Facility: CLINIC | Age: 69
End: 2024-08-01
Payer: COMMERCIAL

## 2024-08-01 DIAGNOSIS — M75.121 COMPLETE ROTATOR CUFF TEAR OR RUPTURE OF RIGHT SHOULDER, NOT SPECIFIED AS TRAUMATIC: ICD-10-CM

## 2024-08-01 PROBLEM — G47.33 OBSTRUCTIVE SLEEP APNEA (ADULT) (PEDIATRIC): Chronic | Status: INACTIVE | Noted: 2020-08-31 | Resolved: 2024-07-31

## 2024-08-01 PROBLEM — C55 MALIGNANT NEOPLASM OF UTERUS, PART UNSPECIFIED: Chronic | Status: ACTIVE | Noted: 2024-07-31

## 2024-08-01 PROBLEM — G47.33 OBSTRUCTIVE SLEEP APNEA (ADULT) (PEDIATRIC): Chronic | Status: ACTIVE | Noted: 2024-07-31

## 2024-08-01 PROBLEM — E11.9 TYPE 2 DIABETES MELLITUS WITHOUT COMPLICATIONS: Chronic | Status: ACTIVE | Noted: 2024-07-31

## 2024-08-01 PROBLEM — E11.9 TYPE 2 DIABETES MELLITUS WITHOUT COMPLICATIONS: Chronic | Status: INACTIVE | Noted: 2023-08-21 | Resolved: 2024-07-31

## 2024-08-01 PROBLEM — R93.89 ABNORMAL FINDINGS ON DIAGNOSTIC IMAGING OF OTHER SPECIFIED BODY STRUCTURES: Chronic | Status: INACTIVE | Noted: 2023-12-28 | Resolved: 2024-07-31

## 2024-08-01 PROCEDURE — 99214 OFFICE O/P EST MOD 30 MIN: CPT

## 2024-08-01 RX ORDER — MELOXICAM 15 MG/1
15 TABLET ORAL
Qty: 30 | Refills: 0 | Status: ACTIVE | COMMUNITY
Start: 2024-08-01 | End: 1900-01-01

## 2024-08-01 NOTE — DISCUSSION/SUMMARY
[de-identified] : We had a thorough discussion regarding the nature of her pain, the pathophysiology, as well as all treatment options. I discussed operative and non-operative treatment modalities. All risks, benefits and alternatives to the proposed surgical procedure, right shoulder arthroscopy with RTC repair and bicep tenodesis with extensive debridement, were discussed in great detail with the patient. Risks include but are not limited to pain, bleeding, infection, stiffness, medical complications (including DVT, PE, MI), and risks of anesthesia. The patient expressed understanding and all questions were answered. The patient is considering the procedure. She should continue with physical therapy. Patient was given prescription of formal physical therapy that she will perform 2x/wk for 6-8 wks. A prescription of Meloxicam was given to be taken as directed with food to prevent GI upset, if occurs pt to D/C and call us at that time. Patient will follow up in 6-8 wks for repeat clinical assessment.

## 2024-08-01 NOTE — PHYSICAL EXAM
[de-identified] : General: Well appearing, no acute distress Neurologic: A&Ox3, No focal deficits Head: NCAT without abrasions, lacerations, or ecchymosis to head, face, or scalp Eyes: No scleral icterus, no gross abnormalities Respiratory: Equal chest wall expansion bilaterally, no accessory muscle use Lymphatic: No lymphadenopathy palpated Skin: Warm and dry Psychiatric: Normal mood and affect   Right Shoulder:  Inspection/Palpation: no tenderness, swelling or deformities  Range of Motion: no crepitus; FF 0-110; ER at side 75; IR to L1  Strength: forward elevation in scapular plane [5]/5, internal rotation [5]/5, external rotation [5]/5, adduction [5]/5 and abduction 4/5 with hitching at the shoulder  Stability: load and shift test negative, apprehension test negative, relocation test negative, sulcus sign negative, Peggy test negative, Jerk test negative  Tests: Rosado negative, Neer's test's test negative, drop arm test negative, bear hug test negative, [place] sign negative, cross arm adduction negative, lift off sign negative, Hornblower's sign negative, speeds test negative, Yergason's test negative, bicipital groove tenderness negative, North's Active Compression test negative, whipple test negative, bicep's load II test negative  Left Shoulder: Inspection/Palpation: no tenderness, swelling or deformities. No evidence of muscle atrophy. No swelling, erythema, or ecchymosis. Nontender to palpation throughout the right shoulder including the anterior aspect of the bicipital groove, AC joint, trapezius, and posterior shoulder. Range of Motion: full and painless in all planes, no crepitus; FF 0-170; ER at side 45; IR to T7.  Strength: forward elevation in scapular plane [5]/5, internal rotation [5]/5, external rotation [5]/5, adduction [5]/5 and abduction [5]/5. Stability: load and shift test negative, apprehension test negative, relocation test negative, sulcus sign negative, Peggy test negative, Jerk test negative. Tests: Rosado negative, Neer's test's test negative, drop arm test negative, bear hug test negative, [place] sign negative, cross arm adduction negative, lift off sign negative, Hornblower's sign negative, speeds test negative, Yergason's test negative, bicipital groove tenderness negative, North's Active Compression test negative, whipple test negative, bicep's load II test negative. Components of the arm are soft and nontender without evidence of DVT or compartment syndrome. The patient is grossly neurovascularly intact.    [de-identified] : MRI [facility] EXAM: [id number] - MR SHOULDER RT  - ORDERED BY:  JERROD LYONS PROCEDURE DATE:  07/26/2024  IMPRESSION: Moderate to severe tendinosis of the supraspinatus tendon with full-thickness tearing along the posterior insertional fibers. This is new since the prior examination.  Moderate tendinosis of the infraspinatus tendon with mild intrasubstance tearing along the anterior insertional fibers. This is also new since the prior examination.  Unchanged moderate to severe tendinosis of the subscapularis tendon with intrasubstance tearing along the cranial insertional fibers.  Moderate intra-articular long head biceps tendinosis. Proximal extra articular portion long head biceps tendon is subluxed medially and perched on the lesser tuberosity. These findings are progressed since the prior examination.  Moderate acromioclavicular joint arthrosis.  Small glenohumeral joint effusion. Subacromial/subdeltoid bursitis.

## 2024-08-01 NOTE — HISTORY OF PRESENT ILLNESS
[de-identified] : MAKSIM CASIANO is a 68 year old female being seen for an MRI review of her right shoulder from [facility]. Patient has been compliant with PT.

## 2024-08-01 NOTE — PHYSICAL EXAM
[de-identified] : General: Well appearing, no acute distress Neurologic: A&Ox3, No focal deficits Head: NCAT without abrasions, lacerations, or ecchymosis to head, face, or scalp Eyes: No scleral icterus, no gross abnormalities Respiratory: Equal chest wall expansion bilaterally, no accessory muscle use Lymphatic: No lymphadenopathy palpated Skin: Warm and dry Psychiatric: Normal mood and affect   Right Shoulder:  Inspection/Palpation: no tenderness, swelling or deformities  Range of Motion: no crepitus; FF 0-110; ER at side 75; IR to L1  Strength: forward elevation in scapular plane [5]/5, internal rotation [5]/5, external rotation [5]/5, adduction [5]/5 and abduction 4/5 with hitching at the shoulder  Stability: load and shift test negative, apprehension test negative, relocation test negative, sulcus sign negative, Peggy test negative, Jerk test negative  Tests: Rosado negative, Neer's test's test negative, drop arm test negative, bear hug test negative, Kent sign negative, cross arm adduction negative, lift off sign negative, Hornblower's sign negative, speeds test negative, Yergason's test negative, bicipital groove tenderness negative, North's Active Compression test negative, whipple test negative, bicep's load II test negative  Left Shoulder: Inspection/Palpation: no tenderness, swelling or deformities. No evidence of muscle atrophy. No swelling, erythema, or ecchymosis. Nontender to palpation throughout the right shoulder including the anterior aspect of the bicipital groove, AC joint, trapezius, and posterior shoulder. Range of Motion: full and painless in all planes, no crepitus; FF 0-170; ER at side 45; IR to T7.  Strength: forward elevation in scapular plane [5]/5, internal rotation [5]/5, external rotation [5]/5, adduction [5]/5 and abduction [5]/5. Stability: load and shift test negative, apprehension test negative, relocation test negative, sulcus sign negative, Peggy test negative, Jerk test negative. Tests: Rosado negative, Neer's test's test negative, drop arm test negative, bear hug test negative, Kent sign negative, cross arm adduction negative, lift off sign negative, Hornblower's sign negative, speeds test negative, Yergason's test negative, bicipital groove tenderness negative, North's Active Compression test negative, whipple test negative, bicep's load II test negative. Components of the arm are soft and nontender without evidence of DVT or compartment syndrome. The patient is grossly neurovascularly intact.    [de-identified] : MRI [facility] EXAM: [id number] - MR SHOULDER RT  - ORDERED BY:  JERROD LYONS PROCEDURE DATE:  07/26/2024  IMPRESSION: Moderate to severe tendinosis of the supraspinatus tendon with full-thickness tearing along the posterior insertional fibers. This is new since the prior examination.  Moderate tendinosis of the infraspinatus tendon with mild intrasubstance tearing along the anterior insertional fibers. This is also new since the prior examination.  Unchanged moderate to severe tendinosis of the subscapularis tendon with intrasubstance tearing along the cranial insertional fibers.  Moderate intra-articular long head biceps tendinosis. Proximal extra articular portion long head biceps tendon is subluxed medially and perched on the lesser tuberosity. These findings are progressed since the prior examination.  Moderate acromioclavicular joint arthrosis.  Small glenohumeral joint effusion. Subacromial/subdeltoid bursitis.

## 2024-08-01 NOTE — ADDENDUM
[FreeTextEntry1] : Documented by Smiley Bell acting as a scribe for Dr. Quinonez and Maxi Urias PA-C on 08/01/2024. All medical record entries made by the Scribe were at my, Dr. Quinonez, and Maxi Urias's, direction and personally dictated by me on 08/01/2024. I have reviewed the chart and agree that the record accurately reflects my personal performance of the history, physical exam, procedure and imaging.

## 2024-08-01 NOTE — HISTORY OF PRESENT ILLNESS
Dr. Hughes requested that patient's parents be called regarding continued increase in Fecal Calprotectin.  Dr. Hughes recommended scheduling clinic follow-up to discuss current status and treatment options.  Patient's mother was called and updated.  Patient was scheduled on 10/27/17 at 1400 for clinic appointment.  Elio Ocasio RN   [de-identified] : MAKSIM CASIANO is a 68 year old female being seen for an MRI review of her right shoulder from [facility]. Patient has been compliant with PT.

## 2024-08-01 NOTE — DISCUSSION/SUMMARY
[de-identified] : We had a thorough discussion regarding the nature of her pain, the pathophysiology, as well as all treatment options. I discussed operative and non-operative treatment modalities. All risks, benefits and alternatives to the proposed surgical procedure, right shoulder arthroscopy with RTC repair and bicep tenodesis with extensive debridement, were discussed in great detail with the patient. Risks include but are not limited to pain, bleeding, infection, stiffness, medical complications (including DVT, PE, MI), and risks of anesthesia. The patient expressed understanding and all questions were answered. The patient is considering the procedure. She should continue with physical therapy. Patient was given prescription of formal physical therapy that she will perform 2x/wk for 6-8 wks. A prescription of Meloxicam was given to be taken as directed with food to prevent GI upset, if occurs pt to D/C and call us at that time. Patient will follow up in 6-8 wks for repeat clinical assessment.

## 2024-08-02 ENCOUNTER — APPOINTMENT (OUTPATIENT)
Dept: GYNECOLOGIC ONCOLOGY | Facility: CLINIC | Age: 69
End: 2024-08-02
Payer: COMMERCIAL

## 2024-08-02 VITALS
HEIGHT: 63 IN | HEART RATE: 72 BPM | RESPIRATION RATE: 17 BRPM | SYSTOLIC BLOOD PRESSURE: 131 MMHG | DIASTOLIC BLOOD PRESSURE: 84 MMHG | OXYGEN SATURATION: 98 % | WEIGHT: 172 LBS | TEMPERATURE: 96.9 F | BODY MASS INDEX: 30.48 KG/M2

## 2024-08-02 DIAGNOSIS — C54.1 MALIGNANT NEOPLASM OF ENDOMETRIUM: ICD-10-CM

## 2024-08-02 DIAGNOSIS — R19.4 CHANGE IN BOWEL HABIT: ICD-10-CM

## 2024-08-02 PROCEDURE — 99214 OFFICE O/P EST MOD 30 MIN: CPT

## 2024-08-02 PROCEDURE — 99459 PELVIC EXAMINATION: CPT

## 2024-08-03 NOTE — PHYSICAL EXAM
[Chaperone Present] : A chaperone was present in the examining room during all aspects of the physical examination [66161] : A chaperone was present during the pelvic exam. [Abnormal] : Examination of extremities for edema and/or varicosities: Abnormal [Absent] : Adnexa(ae): Absent [Normal] : Recto-Vaginal Exam: Normal [FreeTextEntry2] : Britt GUTIERREZ [de-identified] : Trace edma [de-identified] : Sean sites inctact [de-identified] : atrophy

## 2024-08-03 NOTE — PHYSICAL EXAM
[Chaperone Present] : A chaperone was present in the examining room during all aspects of the physical examination [[id number]] : A chaperone was present during the pelvic exam. [Abnormal] : Examination of extremities for edema and/or varicosities: Abnormal [Absent] : Adnexa(ae): Absent [Normal] : Recto-Vaginal Exam: Normal [FreeTextEntry2] : Britt GUTIERREZ [de-identified] : Trace edma [de-identified] : Sean sites inctact [de-identified] : atrophy

## 2024-08-03 NOTE — REVIEW OF SYSTEMS
[Negative] : Respiratory [FreeTextEntry4] : See HPI [de-identified] : See HPI [de-identified] : Seeing ortho for shoulder issue

## 2024-08-03 NOTE — HISTORY OF PRESENT ILLNESS
[FreeTextEntry1] : Stage IA G1 Ni EM Ca.  Sean TLH, BSO, OMent, Cysto - 1/9/24 Referring GYN - Dr. Shelli Hairston PCP - Dr. Curtis Manning GI - Dr. Juan C Ambrocio - Dr. Balta Verma Cards- Dr. Kenyon Sarmiento  - Dr ANDI Oleary  She RTO feeling well, noting no VB, VD, or pain. She is seeing  (DC) for mgmt of stones and litho planned per pt. She reports a bowel habit change - incomplete bowel emptying.   JEET CAMPBELL directed by LUX.

## 2024-08-03 NOTE — REVIEW OF SYSTEMS
[Negative] : Respiratory [FreeTextEntry4] : See HPI [de-identified] : See HPI [de-identified] : Seeing ortho for shoulder issue

## 2024-08-03 NOTE — DISCUSSION/SUMMARY
[Reviewed Clinical Lab Test(s)] : Results of clinical tests were reviewed. [Reviewed Radiology Report(s)] : Radiology reports were reviewed. [FreeTextEntry1] : She is LESLY -We disc her dz status and rec for planned surv.  -We disc her ongoing  care. -Given her sx, I advised she see Dr NICOLA Bear in f/u for eval. KS tasked.  -She is to see DM for WWC, incl BHM she notes.  -Her instructions were reviewed. All Q/A.  -She'll RTO in 3m, sooner prn.

## 2024-08-13 NOTE — ED PROVIDER NOTE - NSPTACCESSSVCSAPPTDETAILS_ED_ALL_ED_FT
Labs to be done at facility   Plan for urological procedure on 08/20 at [facility], vancomycin preop and single dose of Rocephin versus aminoglycoside IV   Follow up with Plastic surgery on 08/22  F/u virtual 2 weeks    URGENT -- CROWN and monoclonal Ab referral -- 65 y F COVID+, HTN, HLD

## 2024-08-20 ENCOUNTER — TRANSCRIPTION ENCOUNTER (OUTPATIENT)
Age: 69
End: 2024-08-20

## 2024-08-21 ENCOUNTER — TRANSCRIPTION ENCOUNTER (OUTPATIENT)
Age: 69
End: 2024-08-21

## 2024-08-21 ENCOUNTER — RESULT REVIEW (OUTPATIENT)
Age: 69
End: 2024-08-21

## 2024-08-21 ENCOUNTER — APPOINTMENT (OUTPATIENT)
Dept: UROLOGY | Facility: HOSPITAL | Age: 69
End: 2024-08-21

## 2024-08-21 ENCOUNTER — OUTPATIENT (OUTPATIENT)
Dept: OUTPATIENT SERVICES | Facility: HOSPITAL | Age: 69
LOS: 1 days | End: 2024-08-21
Payer: COMMERCIAL

## 2024-08-21 VITALS
TEMPERATURE: 97 F | HEART RATE: 81 BPM | OXYGEN SATURATION: 98 % | HEIGHT: 63 IN | SYSTOLIC BLOOD PRESSURE: 137 MMHG | DIASTOLIC BLOOD PRESSURE: 76 MMHG | WEIGHT: 171.96 LBS | RESPIRATION RATE: 16 BRPM

## 2024-08-21 VITALS
DIASTOLIC BLOOD PRESSURE: 76 MMHG | TEMPERATURE: 97 F | OXYGEN SATURATION: 100 % | SYSTOLIC BLOOD PRESSURE: 160 MMHG | RESPIRATION RATE: 15 BRPM | HEART RATE: 65 BPM

## 2024-08-21 DIAGNOSIS — Z98.890 OTHER SPECIFIED POSTPROCEDURAL STATES: Chronic | ICD-10-CM

## 2024-08-21 DIAGNOSIS — N20.0 CALCULUS OF KIDNEY: ICD-10-CM

## 2024-08-21 DIAGNOSIS — Z98.891 HISTORY OF UTERINE SCAR FROM PREVIOUS SURGERY: Chronic | ICD-10-CM

## 2024-08-21 DIAGNOSIS — Z90.710 ACQUIRED ABSENCE OF BOTH CERVIX AND UTERUS: Chronic | ICD-10-CM

## 2024-08-21 DIAGNOSIS — L81.4 OTHER MELANIN HYPERPIGMENTATION: ICD-10-CM

## 2024-08-21 LAB — GLUCOSE BLDC GLUCOMTR-MCNC: 102 MG/DL — HIGH (ref 70–99)

## 2024-08-21 PROCEDURE — 82365 CALCULUS SPECTROSCOPY: CPT

## 2024-08-21 PROCEDURE — C9761: CPT

## 2024-08-21 PROCEDURE — 52356 CYSTO/URETERO W/LITHOTRIPSY: CPT | Mod: LT

## 2024-08-21 PROCEDURE — C1889: CPT

## 2024-08-21 PROCEDURE — C1758: CPT

## 2024-08-21 PROCEDURE — C1747: CPT

## 2024-08-21 PROCEDURE — 88300 SURGICAL PATH GROSS: CPT | Mod: 26

## 2024-08-21 PROCEDURE — 88300 SURGICAL PATH GROSS: CPT

## 2024-08-21 PROCEDURE — 53899 UNLISTED PX URINARY SYSTEM: CPT | Mod: LT,59

## 2024-08-21 PROCEDURE — C1769: CPT

## 2024-08-21 PROCEDURE — 82962 GLUCOSE BLOOD TEST: CPT

## 2024-08-21 PROCEDURE — 74420 UROGRAPHY RTRGR +-KUB: CPT | Mod: 26

## 2024-08-21 PROCEDURE — 76000 FLUOROSCOPY <1 HR PHYS/QHP: CPT

## 2024-08-21 PROCEDURE — C2617: CPT

## 2024-08-21 DEVICE — URETERAL CATH DUAL LUMEN 10FR 54CM: Type: IMPLANTABLE DEVICE | Site: LEFT | Status: FUNCTIONAL

## 2024-08-21 DEVICE — URETERAL SHEATH NAVIGATOR HD 12/14FR X 28CM: Type: IMPLANTABLE DEVICE | Site: LEFT | Status: FUNCTIONAL

## 2024-08-21 DEVICE — GUIDEWIRE SENSOR DUAL-FLEX NITINOL STRAIGHT .035" X 150CM: Type: IMPLANTABLE DEVICE | Site: LEFT | Status: FUNCTIONAL

## 2024-08-21 DEVICE — URETEROSCOPE CVAC 2 STEERABLE IRR ASP DISP: Type: IMPLANTABLE DEVICE | Site: LEFT | Status: FUNCTIONAL

## 2024-08-21 DEVICE — URETERAL CATH OPEN END 5FR 70CM: Type: IMPLANTABLE DEVICE | Site: LEFT | Status: FUNCTIONAL

## 2024-08-21 DEVICE — STENT URET INLAY OPTIMA 6FRX24CM: Type: IMPLANTABLE DEVICE | Site: LEFT | Status: FUNCTIONAL

## 2024-08-21 DEVICE — STONE BASKET ZEROTIP NITINOL 4-WIRE 3FR 120CM X 16MM: Type: IMPLANTABLE DEVICE | Site: LEFT | Status: FUNCTIONAL

## 2024-08-21 DEVICE — LASER FIBER SOLTIVE 200 BALL TIP: Type: IMPLANTABLE DEVICE | Site: LEFT | Status: FUNCTIONAL

## 2024-08-21 RX ORDER — APREPITANT 40 MG/1
40 CAPSULE ORAL ONCE
Refills: 0 | Status: COMPLETED | OUTPATIENT
Start: 2024-08-21 | End: 2024-08-21

## 2024-08-21 RX ORDER — CEFAZOLIN SODIUM 2 G/100ML
2000 INJECTION, SOLUTION INTRAVENOUS ONCE
Refills: 0 | Status: COMPLETED | OUTPATIENT
Start: 2024-08-21 | End: 2024-08-21

## 2024-08-21 RX ORDER — ONDANSETRON 2 MG/ML
4 INJECTION, SOLUTION INTRAMUSCULAR; INTRAVENOUS ONCE
Refills: 0 | Status: COMPLETED | OUTPATIENT
Start: 2024-08-21 | End: 2024-08-21

## 2024-08-21 RX ORDER — OXYCODONE HYDROCHLORIDE 5 MG/1
5 TABLET ORAL ONCE
Refills: 0 | Status: DISCONTINUED | OUTPATIENT
Start: 2024-08-21 | End: 2024-08-21

## 2024-08-21 RX ORDER — DESLORATADINE 5 MG/1
1 TABLET ORAL
Refills: 0 | DISCHARGE

## 2024-08-21 RX ORDER — KETOROLAC TROMETHAMINE 30 MG/ML
30 INJECTION, SOLUTION INTRAMUSCULAR ONCE
Refills: 0 | Status: DISCONTINUED | OUTPATIENT
Start: 2024-08-21 | End: 2024-08-21

## 2024-08-21 RX ORDER — MELOXICAM 15 MG
1 TABLET ORAL
Refills: 0 | DISCHARGE

## 2024-08-21 RX ORDER — ROSUVASTATIN CALCIUM 10 MG
1 TABLET ORAL
Refills: 0 | DISCHARGE

## 2024-08-21 RX ORDER — TAMSULOSIN HYDROCHLORIDE 0.4 MG/1
1 CAPSULE ORAL
Qty: 21 | Refills: 0
Start: 2024-08-21 | End: 2024-09-10

## 2024-08-21 RX ORDER — CEPHALEXIN 500 MG
1 CAPSULE ORAL
Qty: 6 | Refills: 0
Start: 2024-08-21 | End: 2024-08-23

## 2024-08-21 RX ADMIN — ONDANSETRON 4 MILLIGRAM(S): 2 INJECTION, SOLUTION INTRAMUSCULAR; INTRAVENOUS at 12:24

## 2024-08-21 RX ADMIN — APREPITANT 40 MILLIGRAM(S): 40 CAPSULE ORAL at 10:05

## 2024-08-21 RX ADMIN — Medication 100 MILLILITER(S): at 10:05

## 2024-08-21 RX ADMIN — KETOROLAC TROMETHAMINE 30 MILLIGRAM(S): 30 INJECTION, SOLUTION INTRAMUSCULAR at 12:21

## 2024-08-21 RX ADMIN — KETOROLAC TROMETHAMINE 30 MILLIGRAM(S): 30 INJECTION, SOLUTION INTRAMUSCULAR at 12:45

## 2024-08-21 NOTE — PRE-ANESTHESIA EVALUATION ADULT - NSANTHPMHFT_GEN_ALL_CORE
asthma developed post COVID - rarely requires her rescue inhaler  took Breztri this morning  follows with pulmonologist regularly  had a cardiology work up prior to recent hysterectomy that was normal per patient  echo normal in the chart

## 2024-08-21 NOTE — ASU DISCHARGE PLAN (ADULT/PEDIATRIC) - ASU DC SPECIAL INSTRUCTIONSFT
It is common to have blood in the urine after your procedure.  It may be pink or even red; inform your doctor if you have a significant amount of clots in the urine or if you are unable to void at all.  -It is not uncommon to have some burning when you urinate, this will improve over the next few days.  -You have an internal stent (a hollow tube that runs from the kidney to your bladder) after your procedure, helping your kidney drain down to your bladder after your surgery.  Some patients do not notice that they have a stent, while others complain of the sensation of needing to urinate frequently, burning on urination, or even some back pain (especially when they go to urinate). These sensations usually improve gradually, some faster than others. This is not uncommon, but may initially warrant the use of the pain medication which you were prescribed.  While the stent is in place, your urine may continue to be bloody. This stent is temporary and must be removed/exchanged by your urologist.  -Provided that you are not restricted with fluids by your physician, you should drink 6-8 (8 oz.) glasses of fluid per day.  -You may resume your regular diet and regular medication regimen.    -You may shower or bathe.    -You may take over the counter pain medications such as Motrin and Tylenol as needed for pain.  Do not exceed 4 grams of Tylenol daily.  Each medication may be taken every 6 hours.  You may alternate these medications such that you take either one every 3 hours.  If you have severe pain that does not improve with the pain medication or you have persistent vomiting, call your doctor.  -You may have a prescription for an antibiotic KEFLEX when you go home.  Take this medication as instructed; if you miss one dose, resume taking it on your previous schedule until you have completed the entire course of treatment.  -As you have just underwent general anesthesia, you should refrain from driving, heavy lifting, smoking, alcohol consumption, or important decision making for the next 24 hours.  You may climb stairs and you may resume sexual activity.  -Call your physician if you have a fever over 101F.  -Make a follow up appointment for with your urologist when you arrive home (or the next business day). You will have your stent removed in the office next week with Dr. Oleary. Please call office to arrange this if you do not hear back from them  -Call your urologist during normal business hours with any other routine questions.

## 2024-08-21 NOTE — ASU DISCHARGE PLAN (ADULT/PEDIATRIC) - CARE PROVIDER_API CALL
Manuel Oleary  [facility]  Urology  [address] [place] [address], [address]  [place] [address]  Phone: [phone]  Fax: [phone]  Follow Up Time: 1 week

## 2024-08-21 NOTE — BRIEF OPERATIVE NOTE - NSICDXBRIEFPROCEDURE_GEN_ALL_CORE_FT
PROCEDURES:  Cystoscopy with left retrograde pyelography with ureteroscopy with insertion of ureteral stent 21-Aug-2024 11:58:49  Wilma Gao

## 2024-08-21 NOTE — ASU PATIENT PROFILE, ADULT - FALL HARM RISK - UNIVERSAL INTERVENTIONS
Bed in lowest position, wheels locked, appropriate side rails in place/Call bell, personal items and telephone in reach/Instruct patient to call for assistance before getting out of bed or chair/Non-slip footwear when patient is out of bed/[place] to call system/Physically safe environment - no spills, clutter or unnecessary equipment/Purposeful Proactive Rounding/Room/bathroom lighting operational, light cord in reach

## 2024-08-21 NOTE — ASU DISCHARGE PLAN (ADULT/PEDIATRIC) - NURSING INSTRUCTIONS
If needed you can take Tylenol 650mg-1000mg again at 4:45pm for pain. Please do not exceed more then 4000mg in 24 hours. You can also alternate with Ibuprofen 600mg and take if every 6 hours, 3 hours apart from Tylenol with food. The next time you can take Ibuprofen is 6:15pm.

## 2024-08-23 ENCOUNTER — NON-APPOINTMENT (OUTPATIENT)
Age: 69
End: 2024-08-23

## 2024-08-27 LAB
CELL MATERIAL STONE EST-MCNT: SIGNIFICANT CHANGE UP
LABORATORY COMMENT REPORT: SIGNIFICANT CHANGE UP
NIDUS STONE QN: SIGNIFICANT CHANGE UP

## 2024-08-28 DIAGNOSIS — N20.0 CALCULUS OF KIDNEY: ICD-10-CM

## 2024-08-29 ENCOUNTER — APPOINTMENT (OUTPATIENT)
Dept: UROLOGY | Facility: CLINIC | Age: 69
End: 2024-08-29
Payer: COMMERCIAL

## 2024-08-29 ENCOUNTER — OUTPATIENT (OUTPATIENT)
Dept: OUTPATIENT SERVICES | Facility: HOSPITAL | Age: 69
LOS: 1 days | End: 2024-08-29
Payer: MEDICARE

## 2024-08-29 VITALS — SYSTOLIC BLOOD PRESSURE: 132 MMHG | HEART RATE: 104 BPM | DIASTOLIC BLOOD PRESSURE: 84 MMHG

## 2024-08-29 DIAGNOSIS — R31.9 HEMATURIA, UNSPECIFIED: ICD-10-CM

## 2024-08-29 DIAGNOSIS — Z98.890 OTHER SPECIFIED POSTPROCEDURAL STATES: Chronic | ICD-10-CM

## 2024-08-29 DIAGNOSIS — R35.0 FREQUENCY OF MICTURITION: ICD-10-CM

## 2024-08-29 DIAGNOSIS — Z98.891 HISTORY OF UTERINE SCAR FROM PREVIOUS SURGERY: Chronic | ICD-10-CM

## 2024-08-29 DIAGNOSIS — Z90.710 ACQUIRED ABSENCE OF BOTH CERVIX AND UTERUS: Chronic | ICD-10-CM

## 2024-08-29 LAB
APPEARANCE: CLEAR
BACTERIA: NEGATIVE /HPF
BILIRUBIN URINE: NEGATIVE
BLOOD URINE: ABNORMAL
CAST: 0 /LPF
COLOR: YELLOW
EPITHELIAL CELLS: 1 /HPF
GLUCOSE QUALITATIVE U: NEGATIVE MG/DL
KETONES URINE: NEGATIVE MG/DL
LEUKOCYTE ESTERASE URINE: ABNORMAL
MICROSCOPIC-UA: NORMAL
NITRITE URINE: NEGATIVE
PH URINE: 7
PROTEIN URINE: 30 MG/DL
RED BLOOD CELLS URINE: 12 /HPF
SPECIFIC GRAVITY URINE: 1.01
UROBILINOGEN URINE: 0.2 MG/DL
WHITE BLOOD CELLS URINE: 10 /HPF

## 2024-08-29 PROCEDURE — 52310 CYSTOSCOPY AND TREATMENT: CPT

## 2024-08-30 DIAGNOSIS — N20.0 CALCULUS OF KIDNEY: ICD-10-CM

## 2024-08-30 DIAGNOSIS — R31.9 HEMATURIA, UNSPECIFIED: ICD-10-CM

## 2024-08-31 LAB
BACTERIA UR CULT: NORMAL
SURGICAL PATHOLOGY STUDY: SIGNIFICANT CHANGE UP
URINE CYTOLOGY: NORMAL

## 2024-09-04 ENCOUNTER — RX RENEWAL (OUTPATIENT)
Age: 69
End: 2024-09-04

## 2024-09-17 ENCOUNTER — APPOINTMENT (OUTPATIENT)
Dept: ORTHOPEDIC SURGERY | Facility: CLINIC | Age: 69
End: 2024-09-17

## 2024-10-03 ENCOUNTER — OUTPATIENT (OUTPATIENT)
Dept: OUTPATIENT SERVICES | Facility: HOSPITAL | Age: 69
LOS: 1 days | End: 2024-10-03
Payer: COMMERCIAL

## 2024-10-03 ENCOUNTER — APPOINTMENT (OUTPATIENT)
Dept: GASTROENTEROLOGY | Facility: CLINIC | Age: 69
End: 2024-10-03
Payer: COMMERCIAL

## 2024-10-03 ENCOUNTER — APPOINTMENT (OUTPATIENT)
Dept: UROLOGY | Facility: CLINIC | Age: 69
End: 2024-10-03
Payer: COMMERCIAL

## 2024-10-03 VITALS
OXYGEN SATURATION: 99 % | BODY MASS INDEX: 29.95 KG/M2 | HEIGHT: 63 IN | HEART RATE: 94 BPM | SYSTOLIC BLOOD PRESSURE: 122 MMHG | DIASTOLIC BLOOD PRESSURE: 64 MMHG | WEIGHT: 169 LBS

## 2024-10-03 DIAGNOSIS — Z98.891 HISTORY OF UTERINE SCAR FROM PREVIOUS SURGERY: Chronic | ICD-10-CM

## 2024-10-03 DIAGNOSIS — Z98.890 OTHER SPECIFIED POSTPROCEDURAL STATES: Chronic | ICD-10-CM

## 2024-10-03 DIAGNOSIS — K21.9 GASTRO-ESOPHAGEAL REFLUX DISEASE W/OUT ESOPHAGITIS: ICD-10-CM

## 2024-10-03 DIAGNOSIS — R35.0 FREQUENCY OF MICTURITION: ICD-10-CM

## 2024-10-03 DIAGNOSIS — R19.4 CHANGE IN BOWEL HABIT: ICD-10-CM

## 2024-10-03 DIAGNOSIS — Z90.710 ACQUIRED ABSENCE OF BOTH CERVIX AND UTERUS: Chronic | ICD-10-CM

## 2024-10-03 DIAGNOSIS — N20.0 CALCULUS OF KIDNEY: ICD-10-CM

## 2024-10-03 PROCEDURE — 99213 OFFICE O/P EST LOW 20 MIN: CPT

## 2024-10-03 PROCEDURE — G2211 COMPLEX E/M VISIT ADD ON: CPT | Mod: NC

## 2024-10-03 PROCEDURE — 99214 OFFICE O/P EST MOD 30 MIN: CPT

## 2024-10-03 PROCEDURE — 76775 US EXAM ABDO BACK WALL LIM: CPT

## 2024-10-03 PROCEDURE — 76775 US EXAM ABDO BACK WALL LIM: CPT | Mod: 26

## 2024-10-03 NOTE — REASON FOR VISIT
[Follow-up] : a follow-up of an existing diagnosis [Spouse] : spouse [FreeTextEntry1] : Reflux, change in bowel habit, recent endometrial cancer

## 2024-10-03 NOTE — HISTORY OF PRESENT ILLNESS
[FreeTextEntry1] : 68-year-old female Normal colonoscopy a year and a half ago, recommendation for 10-year follow-up In the interval, endometrial cancer, postop Microsatellite instability testing negative for Deng syndrome Some recent change of bowel habit noted, though appears to be normalizing Sense of incomplete evacuation, better with prunes Patient with chronic acid reflux well-controlled with famotidine As she recovers from shoulder surgery, daily NSAID use, patient has added pantoprazole into her regimen No other GI issues

## 2024-10-03 NOTE — ASSESSMENT
[FreeTextEntry1] : Reflux without warning signs to suggest esophagitis Functional change of bowel habit improving, likely would benefit from more daily fiber Recent endometrial cancer diagnosis noted, without evidence of genetic abnormality associated with Deng syndrome, no recommendation for updated screening recommendation

## 2024-10-04 DIAGNOSIS — N20.0 CALCULUS OF KIDNEY: ICD-10-CM

## 2024-10-04 NOTE — LETTER BODY
[FreeTextEntry1] : Curtis Manning  [facility] [address] [place] [address] [phone]  Dear Dr. Manning,  Reason for visit: Previous right ureteral stone. Left renal stone s/p left ureteroscopy.  This is a 68 year-old woman with previous gross hematuria, previous right ureteral stone, and left renal stone. Her CT scan in Sept 2020 demonstrated a 3 mm nonobstructing left renal stone without evidence of a right ureteral stone or hydronephrosis. Her renal ultrasound in March 2021 demonstrated a stable small left renal stone. Her CT scan in November 2023 demonstrated a large 1 cm left kidney stone. Patient has a stable 1 cm renal cyst. Her ultrasound in June 2024 demonstrated progression of her kidney stone to 1.5 cm. The patient obtained KUB which confirmed progression of her stone to 1.5 cm. The patient is status post left ureteroscopy, laser lithotripsy, stone extraction, steerable vacuum aspiration of kidney stones and stent placement. The patient returns today for follow-up ultrasound. Ultrasound today demonstrated nonobstructing fragments in the left lower pole kidney. Since she was last seen, the patient denies any urinary complaints or difficulties. The patient denies flank pain or hematuria. She is entirely asymptomatic. The patient denies any changes in health. All other review of systems are negative. Past medical history, family history and social history were unchanged. Medications and allergies were reviewed. She has no known allergies to medication.  On examination, the patient is a healthy-appearing woman in no acute distress. She is alert and oriented follows commands. She has normal mood and affect. She is normocephalic. Neck is supple. Oral no thrush. Respirations are unlabored. Abdomen is soft and nontender. Bladder is nonpalpable. No CVA tenderness. Neurologically she is grossly intact. No peripheral edema. Skin without gross abnormality.   I personally reviewed the ultrasound scan with patient today. Ultrasound demonstrated nonobstructing fragments in the left lower pole kidney.  Assessment: Previous gross hematuria. Left renal stone s/p left ureteroscopy.  Left stone fragments.  I counseled the patient. Patient is status post recent left ureteroscopy, laser lithotripsy, stone extraction, and stent placement. Her ultrasound today demonstrated nonobstructing left stone fragments. I encouraged patient to maintain a low-protein low-sodium diet. I also encouraged hydration to prevent stone formation. She will also repeat KUB Xray to ensure stability. I counseled the patient regarding the procedure. The risks and benefits were discussed. Alternatives were given. I answered the patient's questions. The patient will take the necessary preparations for the procedure. The patient will follow-up as directed and will contact me with any questions or concerns. Thank you for the opportunity to participate in the care of Ms. CASIANO. I will keep you updated on her progress.  Plan: KUB Xray. Follow-up in 6 months.   I spent 30-minutes time today on all issues related to this patient on today date of service including non face to face time.

## 2024-10-04 NOTE — HISTORY OF PRESENT ILLNESS
[FreeTextEntry1] : Follow-up left kidney stone.  Patient went previous Left ureteroscopy, laser lithotripsy, stone extraction, steerable vacuum aspiration of kidney stones and stent placement.   I personally reviewed the ultrasound scan with patient today. Ultrasound demonstrated nonobstructing fragments in the left lower pole kidney.   I encouraged patient to maintain a low-protein low-sodium diet. I also encouraged hydration to prevent stone formation.  KUB.  Follow-up 6 months for renal stone.   Please refer to URO Consult Note.

## 2024-10-04 NOTE — LETTER BODY
[FreeTextEntry1] : Curtis Manning  [facility] [address] [place] [address] [phone]  Dear Dr. Manning,  Reason for visit: Previous right ureteral stone. Left renal stone s/p left ureteroscopy.  This is a 68 year-old woman with previous gross hematuria, previous right ureteral stone, and left renal stone. Her CT scan in Sept 2020 demonstrated a 3 mm nonobstructing left renal stone without evidence of a right ureteral stone or hydronephrosis. Her renal ultrasound in March 2021 demonstrated a stable small left renal stone. Her CT scan in November 2023 demonstrated a large 1 cm left kidney stone. Patient has a stable 1 cm renal cyst. Her ultrasound in June 2024 demonstrated progression of her kidney stone to 1.5 cm. The patient obtained KUB which confirmed progression of her stone to 1.5 cm. The patient is status post left ureteroscopy, laser lithotripsy, stone extraction, steerable vacuum aspiration of kidney stones and stent placement. The patient returns today for follow-up ultrasound. Ultrasound today demonstrated nonobstructing fragments in the left lower pole kidney. Since she was last seen, the patient denies any urinary complaints or difficulties. The patient denies flank pain or hematuria. She is entirely asymptomatic. The patient denies any changes in health. All other review of systems are negative. Past medical history, family history and social history were unchanged. Medications and allergies were reviewed. She has no known allergies to medication.  On examination, the patient is a healthy-appearing woman in no acute distress. She is alert and oriented follows commands. She has normal mood and affect. She is normocephalic. Neck is supple. Oral no thrush. Respirations are unlabored. Abdomen is soft and nontender. Bladder is nonpalpable. No CVA tenderness. Neurologically she is grossly intact. No peripheral edema. Skin without gross abnormality.   I personally reviewed the ultrasound scan with patient today. Ultrasound demonstrated nonobstructing fragments in the left lower pole kidney.  Assessment: Previous gross hematuria. Left renal stone s/p left ureteroscopy.  Left stone fragments.  I counseled the patient. Patient is status post recent left ureteroscopy, laser lithotripsy, stone extraction, and stent placement. Her ultrasound today demonstrated nonobstructing left stone fragments. I encouraged patient to maintain a low-protein low-sodium diet. I also encouraged hydration to prevent stone formation. She will also repeat KUB Xray to ensure stability. I counseled the patient regarding the procedure. The risks and benefits were discussed. Alternatives were given. I answered the patient's questions. The patient will take the necessary preparations for the procedure. The patient will follow-up as directed and will contact me with any questions or concerns. Thank you for the opportunity to participate in the care of Ms. CASIANO. I will keep you updated on her progress.  Plan: KUB Xray. Follow-up in 6 months.   I spent 30-minutes time today on all issues related to this patient on today date of service including non face to face time.

## 2024-10-13 PROBLEM — L03.90 CELLULITIS: Status: ACTIVE | Noted: 2024-10-13

## 2024-10-22 ENCOUNTER — APPOINTMENT (OUTPATIENT)
Dept: RADIOLOGY | Facility: CLINIC | Age: 69
End: 2024-10-22
Payer: COMMERCIAL

## 2024-10-22 ENCOUNTER — OUTPATIENT (OUTPATIENT)
Dept: OUTPATIENT SERVICES | Facility: HOSPITAL | Age: 69
LOS: 1 days | End: 2024-10-22
Payer: COMMERCIAL

## 2024-10-22 DIAGNOSIS — Z90.710 ACQUIRED ABSENCE OF BOTH CERVIX AND UTERUS: Chronic | ICD-10-CM

## 2024-10-22 DIAGNOSIS — Z98.891 HISTORY OF UTERINE SCAR FROM PREVIOUS SURGERY: Chronic | ICD-10-CM

## 2024-10-22 DIAGNOSIS — Z98.890 OTHER SPECIFIED POSTPROCEDURAL STATES: Chronic | ICD-10-CM

## 2024-10-22 DIAGNOSIS — N20.0 CALCULUS OF KIDNEY: ICD-10-CM

## 2024-10-22 PROCEDURE — 74018 RADEX ABDOMEN 1 VIEW: CPT | Mod: 26

## 2024-10-22 PROCEDURE — 74018 RADEX ABDOMEN 1 VIEW: CPT

## 2024-10-31 ENCOUNTER — NON-APPOINTMENT (OUTPATIENT)
Age: 69
End: 2024-10-31

## 2024-11-04 ENCOUNTER — APPOINTMENT (OUTPATIENT)
Dept: GYNECOLOGIC ONCOLOGY | Facility: CLINIC | Age: 69
End: 2024-11-04
Payer: COMMERCIAL

## 2024-11-04 VITALS
HEART RATE: 90 BPM | DIASTOLIC BLOOD PRESSURE: 74 MMHG | WEIGHT: 172 LBS | HEIGHT: 63 IN | OXYGEN SATURATION: 99 % | TEMPERATURE: 97.6 F | SYSTOLIC BLOOD PRESSURE: 116 MMHG | BODY MASS INDEX: 30.48 KG/M2 | RESPIRATION RATE: 18 BRPM

## 2024-11-04 DIAGNOSIS — C54.1 MALIGNANT NEOPLASM OF ENDOMETRIUM: ICD-10-CM

## 2024-11-04 PROCEDURE — 99213 OFFICE O/P EST LOW 20 MIN: CPT

## 2024-11-04 PROCEDURE — 99459 PELVIC EXAMINATION: CPT | Mod: NC

## 2024-11-12 ENCOUNTER — APPOINTMENT (OUTPATIENT)
Dept: PULMONOLOGY | Facility: CLINIC | Age: 69
End: 2024-11-12
Payer: COMMERCIAL

## 2024-11-12 VITALS
OXYGEN SATURATION: 98 % | SYSTOLIC BLOOD PRESSURE: 120 MMHG | RESPIRATION RATE: 18 BRPM | HEIGHT: 63 IN | BODY MASS INDEX: 30.48 KG/M2 | WEIGHT: 172 LBS | TEMPERATURE: 97.2 F | HEART RATE: 86 BPM | DIASTOLIC BLOOD PRESSURE: 68 MMHG

## 2024-11-12 DIAGNOSIS — R06.02 SHORTNESS OF BREATH: ICD-10-CM

## 2024-11-12 DIAGNOSIS — R93.89 ABNORMAL FINDINGS ON DIAGNOSTIC IMAGING OF OTHER SPECIFIED BODY STRUCTURES: ICD-10-CM

## 2024-11-12 DIAGNOSIS — E66.9 OBESITY, UNSPECIFIED: ICD-10-CM

## 2024-11-12 DIAGNOSIS — G47.33 OBSTRUCTIVE SLEEP APNEA (ADULT) (PEDIATRIC): ICD-10-CM

## 2024-11-12 DIAGNOSIS — K21.9 GASTRO-ESOPHAGEAL REFLUX DISEASE W/OUT ESOPHAGITIS: ICD-10-CM

## 2024-11-12 DIAGNOSIS — J30.9 ALLERGIC RHINITIS, UNSPECIFIED: ICD-10-CM

## 2024-11-12 PROCEDURE — 94010 BREATHING CAPACITY TEST: CPT

## 2024-11-12 PROCEDURE — 95012 NITRIC OXIDE EXP GAS DETER: CPT

## 2024-11-12 PROCEDURE — 94799 UNLISTED PULMONARY SVC/PX: CPT

## 2024-11-12 PROCEDURE — 99214 OFFICE O/P EST MOD 30 MIN: CPT | Mod: 25

## 2024-11-12 RX ORDER — MONTELUKAST 10 MG/1
10 TABLET, FILM COATED ORAL
Qty: 1 | Refills: 1 | Status: ACTIVE | COMMUNITY
Start: 2024-11-12 | End: 1900-01-01

## 2024-11-12 RX ORDER — DESLORATADINE 5 MG/1
5 TABLET ORAL DAILY
Qty: 90 | Refills: 1 | Status: ACTIVE | COMMUNITY
Start: 2024-11-12 | End: 1900-01-01

## 2024-11-13 NOTE — ASU DISCHARGE PLAN (ADULT/PEDIATRIC) - NS DC INTERPRETER YES NO
Detail Level: Zone
Initiate Treatment: Apply CeraVe sunscreen SPF30+ daily, reapply every 2 hours to sun exposed areas.
No

## 2024-11-27 DIAGNOSIS — N20.1 CALCULUS OF URETER: ICD-10-CM

## 2024-11-27 DIAGNOSIS — Z00.00 ENCOUNTER FOR GENERAL ADULT MEDICAL EXAMINATION W/OUT ABNORMAL FINDINGS: ICD-10-CM

## 2024-11-27 DIAGNOSIS — M25.50 PAIN IN UNSPECIFIED JOINT: ICD-10-CM

## 2024-11-27 DIAGNOSIS — M81.0 AGE-RELATED OSTEOPOROSIS W/OUT CURRENT PATHOLOGICAL FRACTURE: ICD-10-CM

## 2024-12-04 ENCOUNTER — LABORATORY RESULT (OUTPATIENT)
Age: 69
End: 2024-12-04

## 2024-12-04 LAB
25(OH)D3 SERPL-MCNC: 48.1 NG/ML
ALBUMIN SERPL ELPH-MCNC: 4.3 G/DL
ALP BLD-CCNC: 92 U/L
ALT SERPL-CCNC: 14 U/L
ANION GAP SERPL CALC-SCNC: 14 MMOL/L
AST SERPL-CCNC: 18 U/L
BASOPHILS # BLD AUTO: 0.03 K/UL
BASOPHILS NFR BLD AUTO: 0.6 %
BILIRUB SERPL-MCNC: 0.5 MG/DL
BUN SERPL-MCNC: 13 MG/DL
CALCIUM SERPL-MCNC: 9.8 MG/DL
CCP AB SER IA-ACNC: <8 U/ML
CHLORIDE SERPL-SCNC: 102 MMOL/L
CHOLEST SERPL-MCNC: 212 MG/DL
CO2 SERPL-SCNC: 24 MMOL/L
CREAT SERPL-MCNC: 0.65 MG/DL
EGFR: 95 ML/MIN/1.73M2
EOSINOPHIL # BLD AUTO: 0.04 K/UL
EOSINOPHIL NFR BLD AUTO: 0.7 %
ESTIMATED AVERAGE GLUCOSE: 126 MG/DL
GLUCOSE SERPL-MCNC: 91 MG/DL
HBA1C MFR BLD HPLC: 6 %
HCT VFR BLD CALC: 41.6 %
HDLC SERPL-MCNC: 68 MG/DL
HGB BLD-MCNC: 13.4 G/DL
IMM GRANULOCYTES NFR BLD AUTO: 0.2 %
LDLC SERPL CALC-MCNC: 118 MG/DL
LYMPHOCYTES # BLD AUTO: 1.5 K/UL
LYMPHOCYTES NFR BLD AUTO: 28 %
MAN DIFF?: NORMAL
MCHC RBC-ENTMCNC: 29.6 PG
MCHC RBC-ENTMCNC: 32.2 G/DL
MCV RBC AUTO: 91.8 FL
MONOCYTES # BLD AUTO: 0.43 K/UL
MONOCYTES NFR BLD AUTO: 8 %
NEUTROPHILS # BLD AUTO: 3.35 K/UL
NEUTROPHILS NFR BLD AUTO: 62.5 %
NONHDLC SERPL-MCNC: 144 MG/DL
PLATELET # BLD AUTO: 255 K/UL
POTASSIUM SERPL-SCNC: 4.3 MMOL/L
PROT SERPL-MCNC: 7.1 G/DL
RBC # BLD: 4.53 M/UL
RBC # FLD: 12.9 %
RF+CCP IGG SER-IMP: NEGATIVE
RHEUMATOID FACT SER QL: <10 IU/ML
SODIUM SERPL-SCNC: 140 MMOL/L
TRIGL SERPL-MCNC: 147 MG/DL
WBC # FLD AUTO: 5.36 K/UL

## 2024-12-05 LAB
APPEARANCE: CLEAR
BACTERIA UR CULT: NORMAL
BILIRUBIN URINE: NEGATIVE
BLOOD URINE: NEGATIVE
COLOR: YELLOW
GLUCOSE QUALITATIVE U: NEGATIVE MG/DL
KETONES URINE: NEGATIVE MG/DL
LEUKOCYTE ESTERASE URINE: ABNORMAL
NITRITE URINE: NEGATIVE
PH URINE: 7
PROTEIN URINE: NEGATIVE MG/DL
SPECIFIC GRAVITY URINE: 1
UROBILINOGEN URINE: 0.2 MG/DL

## 2024-12-06 ENCOUNTER — RX RENEWAL (OUTPATIENT)
Age: 69
End: 2024-12-06

## 2024-12-17 ENCOUNTER — NON-APPOINTMENT (OUTPATIENT)
Age: 69
End: 2024-12-17

## 2024-12-17 ENCOUNTER — APPOINTMENT (OUTPATIENT)
Dept: INTERNAL MEDICINE | Facility: CLINIC | Age: 69
End: 2024-12-17
Payer: COMMERCIAL

## 2024-12-17 ENCOUNTER — OUTPATIENT (OUTPATIENT)
Dept: OUTPATIENT SERVICES | Facility: HOSPITAL | Age: 69
LOS: 1 days | End: 2024-12-17

## 2024-12-17 VITALS
SYSTOLIC BLOOD PRESSURE: 118 MMHG | WEIGHT: 178 LBS | BODY MASS INDEX: 31.53 KG/M2 | DIASTOLIC BLOOD PRESSURE: 70 MMHG | HEART RATE: 74 BPM | RESPIRATION RATE: 14 BRPM

## 2024-12-17 DIAGNOSIS — Z98.890 OTHER SPECIFIED POSTPROCEDURAL STATES: Chronic | ICD-10-CM

## 2024-12-17 DIAGNOSIS — Z00.00 ENCOUNTER FOR GENERAL ADULT MEDICAL EXAMINATION W/OUT ABNORMAL FINDINGS: ICD-10-CM

## 2024-12-17 DIAGNOSIS — Z98.891 HISTORY OF UTERINE SCAR FROM PREVIOUS SURGERY: Chronic | ICD-10-CM

## 2024-12-17 DIAGNOSIS — Z90.710 ACQUIRED ABSENCE OF BOTH CERVIX AND UTERUS: Chronic | ICD-10-CM

## 2024-12-17 DIAGNOSIS — Z87.2 PERSONAL HISTORY OF DISEASES OF THE SKIN AND SUBCUTANEOUS TISSUE: ICD-10-CM

## 2024-12-17 DIAGNOSIS — Z92.89 PERSONAL HISTORY OF OTHER MEDICAL TREATMENT: ICD-10-CM

## 2024-12-17 PROCEDURE — G0438: CPT

## 2025-01-06 ENCOUNTER — OUTPATIENT (OUTPATIENT)
Dept: OUTPATIENT SERVICES | Facility: HOSPITAL | Age: 70
LOS: 1 days | End: 2025-01-06
Payer: MEDICARE

## 2025-01-06 ENCOUNTER — APPOINTMENT (OUTPATIENT)
Dept: INTERNAL MEDICINE | Facility: CLINIC | Age: 70
End: 2025-01-06

## 2025-01-06 DIAGNOSIS — Z98.890 OTHER SPECIFIED POSTPROCEDURAL STATES: Chronic | ICD-10-CM

## 2025-01-06 DIAGNOSIS — I10 ESSENTIAL (PRIMARY) HYPERTENSION: ICD-10-CM

## 2025-01-06 DIAGNOSIS — Z90.710 ACQUIRED ABSENCE OF BOTH CERVIX AND UTERUS: Chronic | ICD-10-CM

## 2025-01-06 DIAGNOSIS — Z98.891 HISTORY OF UTERINE SCAR FROM PREVIOUS SURGERY: Chronic | ICD-10-CM

## 2025-01-06 PROCEDURE — 97803 MED NUTRITION INDIV SUBSEQ: CPT

## 2025-01-14 DIAGNOSIS — E78.00 PURE HYPERCHOLESTEROLEMIA, UNSPECIFIED: ICD-10-CM

## 2025-01-17 DIAGNOSIS — I10 ESSENTIAL (PRIMARY) HYPERTENSION: ICD-10-CM

## 2025-01-22 RX ORDER — EPINEPHRINE 0.3 MG/.3ML
0.3 INJECTION INTRAMUSCULAR
Qty: 1 | Refills: 3 | Status: ACTIVE | COMMUNITY
Start: 2025-01-22 | End: 1900-01-01

## 2025-01-31 ENCOUNTER — NON-APPOINTMENT (OUTPATIENT)
Age: 70
End: 2025-01-31

## 2025-01-31 ENCOUNTER — APPOINTMENT (OUTPATIENT)
Dept: GYNECOLOGIC ONCOLOGY | Facility: CLINIC | Age: 70
End: 2025-01-31
Payer: MEDICARE

## 2025-01-31 VITALS
OXYGEN SATURATION: 100 % | SYSTOLIC BLOOD PRESSURE: 127 MMHG | DIASTOLIC BLOOD PRESSURE: 84 MMHG | HEIGHT: 63 IN | BODY MASS INDEX: 30.83 KG/M2 | HEART RATE: 74 BPM | WEIGHT: 174 LBS | TEMPERATURE: 97.9 F

## 2025-01-31 DIAGNOSIS — C54.1 MALIGNANT NEOPLASM OF ENDOMETRIUM: ICD-10-CM

## 2025-01-31 DIAGNOSIS — L98.9 DISORDER OF THE SKIN AND SUBCUTANEOUS TISSUE, UNSPECIFIED: ICD-10-CM

## 2025-01-31 PROCEDURE — 99204 OFFICE O/P NEW MOD 45 MIN: CPT

## 2025-01-31 PROCEDURE — 99459 PELVIC EXAMINATION: CPT

## 2025-01-31 PROCEDURE — 99214 OFFICE O/P EST MOD 30 MIN: CPT

## 2025-03-13 ENCOUNTER — APPOINTMENT (OUTPATIENT)
Dept: MAMMOGRAPHY | Facility: CLINIC | Age: 70
End: 2025-03-13
Payer: MEDICARE

## 2025-03-13 ENCOUNTER — APPOINTMENT (OUTPATIENT)
Dept: ULTRASOUND IMAGING | Facility: CLINIC | Age: 70
End: 2025-03-13
Payer: MEDICARE

## 2025-03-13 ENCOUNTER — RESULT REVIEW (OUTPATIENT)
Age: 70
End: 2025-03-13

## 2025-03-13 ENCOUNTER — OUTPATIENT (OUTPATIENT)
Dept: OUTPATIENT SERVICES | Facility: HOSPITAL | Age: 70
LOS: 1 days | End: 2025-03-13
Payer: MEDICARE

## 2025-03-13 DIAGNOSIS — Z98.891 HISTORY OF UTERINE SCAR FROM PREVIOUS SURGERY: Chronic | ICD-10-CM

## 2025-03-13 DIAGNOSIS — Z98.890 OTHER SPECIFIED POSTPROCEDURAL STATES: Chronic | ICD-10-CM

## 2025-03-13 DIAGNOSIS — Z90.710 ACQUIRED ABSENCE OF BOTH CERVIX AND UTERUS: Chronic | ICD-10-CM

## 2025-03-13 DIAGNOSIS — Z01.419 ENCOUNTER FOR GYNECOLOGICAL EXAMINATION (GENERAL) (ROUTINE) WITHOUT ABNORMAL FINDINGS: ICD-10-CM

## 2025-03-13 PROCEDURE — 77063 BREAST TOMOSYNTHESIS BI: CPT

## 2025-03-13 PROCEDURE — 77067 SCR MAMMO BI INCL CAD: CPT | Mod: 26

## 2025-03-13 PROCEDURE — 77063 BREAST TOMOSYNTHESIS BI: CPT | Mod: 26

## 2025-03-13 PROCEDURE — 77067 SCR MAMMO BI INCL CAD: CPT

## 2025-03-13 PROCEDURE — 76641 ULTRASOUND BREAST COMPLETE: CPT | Mod: 26,50,GA

## 2025-03-13 PROCEDURE — 76641 ULTRASOUND BREAST COMPLETE: CPT

## 2025-03-18 ENCOUNTER — OUTPATIENT (OUTPATIENT)
Dept: OUTPATIENT SERVICES | Facility: HOSPITAL | Age: 70
LOS: 1 days | End: 2025-03-18
Payer: MEDICARE

## 2025-03-18 ENCOUNTER — APPOINTMENT (OUTPATIENT)
Dept: RADIOLOGY | Facility: CLINIC | Age: 70
End: 2025-03-18
Payer: MEDICARE

## 2025-03-18 DIAGNOSIS — M79.673 PAIN IN UNSPECIFIED FOOT: ICD-10-CM

## 2025-03-18 DIAGNOSIS — Z90.710 ACQUIRED ABSENCE OF BOTH CERVIX AND UTERUS: Chronic | ICD-10-CM

## 2025-03-18 DIAGNOSIS — Z98.890 OTHER SPECIFIED POSTPROCEDURAL STATES: Chronic | ICD-10-CM

## 2025-03-18 DIAGNOSIS — Z98.891 HISTORY OF UTERINE SCAR FROM PREVIOUS SURGERY: Chronic | ICD-10-CM

## 2025-03-18 PROCEDURE — 73620 X-RAY EXAM OF FOOT: CPT

## 2025-03-18 PROCEDURE — 73610 X-RAY EXAM OF ANKLE: CPT | Mod: 26,LT

## 2025-03-18 PROCEDURE — 73610 X-RAY EXAM OF ANKLE: CPT

## 2025-03-18 PROCEDURE — 73620 X-RAY EXAM OF FOOT: CPT | Mod: 26,LT

## 2025-03-20 ENCOUNTER — APPOINTMENT (OUTPATIENT)
Dept: ORTHOPEDIC SURGERY | Facility: CLINIC | Age: 70
End: 2025-03-20

## 2025-03-20 ENCOUNTER — APPOINTMENT (OUTPATIENT)
Dept: OBGYN | Facility: CLINIC | Age: 70
End: 2025-03-20
Payer: MEDICARE

## 2025-03-20 ENCOUNTER — NON-APPOINTMENT (OUTPATIENT)
Age: 70
End: 2025-03-20

## 2025-03-20 VITALS
HEIGHT: 63 IN | WEIGHT: 179 LBS | DIASTOLIC BLOOD PRESSURE: 81 MMHG | SYSTOLIC BLOOD PRESSURE: 156 MMHG | BODY MASS INDEX: 31.71 KG/M2

## 2025-03-20 DIAGNOSIS — R93.89 ABNORMAL FINDINGS ON DIAGNOSTIC IMAGING OF OTHER SPECIFIED BODY STRUCTURES: ICD-10-CM

## 2025-03-20 DIAGNOSIS — N76.3 SUBACUTE AND CHRONIC VULVITIS: ICD-10-CM

## 2025-03-20 DIAGNOSIS — Z01.419 ENCOUNTER FOR GYNECOLOGICAL EXAMINATION (GENERAL) (ROUTINE) W/OUT ABNORMAL FINDINGS: ICD-10-CM

## 2025-03-20 PROCEDURE — G0328 FECAL BLOOD SCRN IMMUNOASSAY: CPT | Mod: QW

## 2025-03-20 PROCEDURE — G0444 DEPRESSION SCREEN ANNUAL: CPT

## 2025-03-20 PROCEDURE — G0101: CPT

## 2025-03-20 RX ORDER — TRIAMCINOLONE ACETONIDE 1 MG/G
0.1 OINTMENT TOPICAL
Qty: 1 | Refills: 0 | Status: ACTIVE | COMMUNITY
Start: 2025-03-20 | End: 1900-01-01

## 2025-03-23 LAB
BV BACTERIA RRNA VAG QL NAA+PROBE: NOT DETECTED
C GLABRATA RNA VAG QL NAA+PROBE: NOT DETECTED
CANDIDA RRNA VAG QL PROBE: NOT DETECTED
T VAGINALIS RRNA SPEC QL NAA+PROBE: NOT DETECTED

## 2025-03-31 ENCOUNTER — APPOINTMENT (OUTPATIENT)
Dept: PULMONOLOGY | Facility: CLINIC | Age: 70
End: 2025-03-31
Payer: MEDICARE

## 2025-03-31 VITALS
TEMPERATURE: 97.2 F | OXYGEN SATURATION: 98 % | SYSTOLIC BLOOD PRESSURE: 130 MMHG | HEART RATE: 78 BPM | DIASTOLIC BLOOD PRESSURE: 90 MMHG | WEIGHT: 172 LBS | BODY MASS INDEX: 30.48 KG/M2 | RESPIRATION RATE: 16 BRPM | HEIGHT: 63 IN

## 2025-03-31 DIAGNOSIS — J45.909 UNSPECIFIED ASTHMA, UNCOMPLICATED: ICD-10-CM

## 2025-03-31 DIAGNOSIS — K21.9 GASTRO-ESOPHAGEAL REFLUX DISEASE W/OUT ESOPHAGITIS: ICD-10-CM

## 2025-03-31 DIAGNOSIS — G47.33 OBSTRUCTIVE SLEEP APNEA (ADULT) (PEDIATRIC): ICD-10-CM

## 2025-03-31 DIAGNOSIS — R93.89 ABNORMAL FINDINGS ON DIAGNOSTIC IMAGING OF OTHER SPECIFIED BODY STRUCTURES: ICD-10-CM

## 2025-03-31 DIAGNOSIS — R06.02 SHORTNESS OF BREATH: ICD-10-CM

## 2025-03-31 DIAGNOSIS — E66.9 OBESITY, UNSPECIFIED: ICD-10-CM

## 2025-03-31 DIAGNOSIS — J45.40 MODERATE PERSISTENT ASTHMA, UNCOMPLICATED: ICD-10-CM

## 2025-03-31 PROCEDURE — ZZZZZ: CPT

## 2025-03-31 PROCEDURE — 94727 GAS DIL/WSHOT DETER LNG VOL: CPT

## 2025-03-31 PROCEDURE — 99214 OFFICE O/P EST MOD 30 MIN: CPT | Mod: 25

## 2025-03-31 PROCEDURE — 99204 OFFICE O/P NEW MOD 45 MIN: CPT | Mod: 25

## 2025-03-31 PROCEDURE — 95012 NITRIC OXIDE EXP GAS DETER: CPT

## 2025-03-31 PROCEDURE — 94729 DIFFUSING CAPACITY: CPT

## 2025-03-31 PROCEDURE — 94618 PULMONARY STRESS TESTING: CPT

## 2025-03-31 PROCEDURE — 94010 BREATHING CAPACITY TEST: CPT

## 2025-04-02 ENCOUNTER — APPOINTMENT (OUTPATIENT)
Dept: CARDIOLOGY | Facility: CLINIC | Age: 70
End: 2025-04-02
Payer: MEDICARE

## 2025-04-02 ENCOUNTER — NON-APPOINTMENT (OUTPATIENT)
Age: 70
End: 2025-04-02

## 2025-04-02 VITALS
WEIGHT: 176 LBS | DIASTOLIC BLOOD PRESSURE: 86 MMHG | OXYGEN SATURATION: 97 % | HEART RATE: 79 BPM | SYSTOLIC BLOOD PRESSURE: 144 MMHG | BODY MASS INDEX: 31.18 KG/M2

## 2025-04-02 DIAGNOSIS — R09.89 OTHER SPECIFIED SYMPTOMS AND SIGNS INVOLVING THE CIRCULATORY AND RESPIRATORY SYSTEMS: ICD-10-CM

## 2025-04-02 PROCEDURE — 99214 OFFICE O/P EST MOD 30 MIN: CPT

## 2025-04-02 PROCEDURE — 99204 OFFICE O/P NEW MOD 45 MIN: CPT

## 2025-04-02 PROCEDURE — 93000 ELECTROCARDIOGRAM COMPLETE: CPT

## 2025-04-02 PROCEDURE — G2211 COMPLEX E/M VISIT ADD ON: CPT

## 2025-04-03 ENCOUNTER — APPOINTMENT (OUTPATIENT)
Dept: UROLOGY | Facility: CLINIC | Age: 70
End: 2025-04-03
Payer: MEDICARE

## 2025-04-03 ENCOUNTER — NON-APPOINTMENT (OUTPATIENT)
Age: 70
End: 2025-04-03

## 2025-04-03 ENCOUNTER — APPOINTMENT (OUTPATIENT)
Dept: OPHTHALMOLOGY | Facility: CLINIC | Age: 70
End: 2025-04-03
Payer: MEDICARE

## 2025-04-03 ENCOUNTER — OUTPATIENT (OUTPATIENT)
Dept: OUTPATIENT SERVICES | Facility: HOSPITAL | Age: 70
LOS: 1 days | End: 2025-04-03
Payer: MEDICARE

## 2025-04-03 DIAGNOSIS — Z98.890 OTHER SPECIFIED POSTPROCEDURAL STATES: Chronic | ICD-10-CM

## 2025-04-03 DIAGNOSIS — Z98.891 HISTORY OF UTERINE SCAR FROM PREVIOUS SURGERY: Chronic | ICD-10-CM

## 2025-04-03 DIAGNOSIS — N20.0 CALCULUS OF KIDNEY: ICD-10-CM

## 2025-04-03 DIAGNOSIS — R35.0 FREQUENCY OF MICTURITION: ICD-10-CM

## 2025-04-03 PROCEDURE — 99213 OFFICE O/P EST LOW 20 MIN: CPT

## 2025-04-03 PROCEDURE — 99203 OFFICE O/P NEW LOW 30 MIN: CPT

## 2025-04-03 PROCEDURE — 76514 ECHO EXAM OF EYE THICKNESS: CPT

## 2025-04-03 PROCEDURE — G2211 COMPLEX E/M VISIT ADD ON: CPT

## 2025-04-03 PROCEDURE — 92133 CPTRZD OPH DX IMG PST SGM ON: CPT

## 2025-04-03 PROCEDURE — 76775 US EXAM ABDO BACK WALL LIM: CPT

## 2025-04-03 PROCEDURE — 76775 US EXAM ABDO BACK WALL LIM: CPT | Mod: 26

## 2025-04-03 PROCEDURE — 92014 COMPRE OPH EXAM EST PT 1/>: CPT

## 2025-04-04 DIAGNOSIS — N20.0 CALCULUS OF KIDNEY: ICD-10-CM

## 2025-04-07 ENCOUNTER — APPOINTMENT (OUTPATIENT)
Dept: ORTHOPEDIC SURGERY | Facility: CLINIC | Age: 70
End: 2025-04-07
Payer: MEDICARE

## 2025-04-07 DIAGNOSIS — M72.2 PLANTAR FASCIAL FIBROMATOSIS: ICD-10-CM

## 2025-04-07 DIAGNOSIS — S99.922A UNSPECIFIED INJURY OF LEFT FOOT, INITIAL ENCOUNTER: ICD-10-CM

## 2025-04-07 DIAGNOSIS — M79.672 PAIN IN LEFT FOOT: ICD-10-CM

## 2025-04-07 PROCEDURE — 73630 X-RAY EXAM OF FOOT: CPT | Mod: LT

## 2025-04-07 PROCEDURE — 99214 OFFICE O/P EST MOD 30 MIN: CPT

## 2025-04-09 ENCOUNTER — APPOINTMENT (OUTPATIENT)
Dept: PULMONOLOGY | Facility: CLINIC | Age: 70
End: 2025-04-09
Payer: MEDICARE

## 2025-04-09 PROCEDURE — 94621 CARDIOPULM EXERCISE TESTING: CPT

## 2025-04-09 PROCEDURE — 94375 RESPIRATORY FLOW VOLUME LOOP: CPT

## 2025-04-16 DIAGNOSIS — J45.909 UNSPECIFIED ASTHMA, UNCOMPLICATED: ICD-10-CM

## 2025-05-09 ENCOUNTER — APPOINTMENT (OUTPATIENT)
Dept: GYNECOLOGIC ONCOLOGY | Facility: CLINIC | Age: 70
End: 2025-05-09
Payer: MEDICARE

## 2025-05-09 VITALS
HEIGHT: 63 IN | HEART RATE: 79 BPM | OXYGEN SATURATION: 98 % | DIASTOLIC BLOOD PRESSURE: 91 MMHG | BODY MASS INDEX: 30.3 KG/M2 | WEIGHT: 171 LBS | TEMPERATURE: 98.2 F | SYSTOLIC BLOOD PRESSURE: 160 MMHG

## 2025-05-09 DIAGNOSIS — C54.1 MALIGNANT NEOPLASM OF ENDOMETRIUM: ICD-10-CM

## 2025-05-09 PROCEDURE — 99213 OFFICE O/P EST LOW 20 MIN: CPT

## 2025-05-09 PROCEDURE — 99459 PELVIC EXAMINATION: CPT

## 2025-05-13 ENCOUNTER — APPOINTMENT (OUTPATIENT)
Dept: PULMONOLOGY | Facility: CLINIC | Age: 70
End: 2025-05-13
Payer: MEDICARE

## 2025-05-13 VITALS
RESPIRATION RATE: 16 BRPM | HEART RATE: 71 BPM | HEIGHT: 63.5 IN | WEIGHT: 169 LBS | SYSTOLIC BLOOD PRESSURE: 151 MMHG | BODY MASS INDEX: 29.57 KG/M2 | OXYGEN SATURATION: 99 % | TEMPERATURE: 97.8 F | DIASTOLIC BLOOD PRESSURE: 80 MMHG

## 2025-05-13 PROCEDURE — 99214 OFFICE O/P EST MOD 30 MIN: CPT

## 2025-05-14 ENCOUNTER — NON-APPOINTMENT (OUTPATIENT)
Age: 70
End: 2025-05-14

## 2025-05-14 RX ORDER — TIRZEPATIDE 2.5 MG/.5ML
2.5 INJECTION, SOLUTION SUBCUTANEOUS
Qty: 4 | Refills: 0 | Status: ACTIVE | COMMUNITY
Start: 2025-05-13

## 2025-05-16 DIAGNOSIS — N84.0 POLYP OF CORPUS UTERI: ICD-10-CM

## 2025-05-16 DIAGNOSIS — L91.8 OTHER HYPERTROPHIC DISORDERS OF THE SKIN: ICD-10-CM

## 2025-05-16 DIAGNOSIS — J12.82 COVID-19: ICD-10-CM

## 2025-05-16 DIAGNOSIS — S92.352A DISPLACED FRACTURE OF FIFTH METATARSAL BONE, LEFT FOOT, INITIAL ENCOUNTER FOR CLOSED FRACTURE: ICD-10-CM

## 2025-05-16 DIAGNOSIS — B07.9 VIRAL WART, UNSPECIFIED: ICD-10-CM

## 2025-05-16 DIAGNOSIS — Z01.818 ENCOUNTER FOR OTHER PREPROCEDURAL EXAMINATION: ICD-10-CM

## 2025-05-16 DIAGNOSIS — Z87.09 PERSONAL HISTORY OF OTHER DISEASES OF THE RESPIRATORY SYSTEM: ICD-10-CM

## 2025-05-16 DIAGNOSIS — J45.909 UNSPECIFIED ASTHMA, UNCOMPLICATED: ICD-10-CM

## 2025-05-16 DIAGNOSIS — B37.0 CANDIDAL STOMATITIS: ICD-10-CM

## 2025-05-16 DIAGNOSIS — Z87.898 PERSONAL HISTORY OF OTHER SPECIFIED CONDITIONS: ICD-10-CM

## 2025-05-16 DIAGNOSIS — Z87.448 PERSONAL HISTORY OF OTHER DISEASES OF URINARY SYSTEM: ICD-10-CM

## 2025-05-16 DIAGNOSIS — M79.673 PAIN IN UNSPECIFIED FOOT: ICD-10-CM

## 2025-05-16 DIAGNOSIS — Z87.39 PERSONAL HISTORY OF OTHER DISEASES OF THE MUSCULOSKELETAL SYSTEM AND CONNECTIVE TISSUE: ICD-10-CM

## 2025-05-16 DIAGNOSIS — R06.02 SHORTNESS OF BREATH: ICD-10-CM

## 2025-05-16 DIAGNOSIS — U07.1 COVID-19: ICD-10-CM

## 2025-05-16 DIAGNOSIS — Z01.419 ENCOUNTER FOR GYNECOLOGICAL EXAMINATION (GENERAL) (ROUTINE) W/OUT ABNORMAL FINDINGS: ICD-10-CM

## 2025-05-16 DIAGNOSIS — R93.89 ABNORMAL FINDINGS ON DIAGNOSTIC IMAGING OF OTHER SPECIFIED BODY STRUCTURES: ICD-10-CM

## 2025-05-16 DIAGNOSIS — Z00.00 ENCOUNTER FOR GENERAL ADULT MEDICAL EXAMINATION W/OUT ABNORMAL FINDINGS: ICD-10-CM

## 2025-05-16 DIAGNOSIS — M75.111 INCOMPLETE ROTATOR CUFF TEAR OR RUPTURE OF RIGHT SHOULDER, NOT SPECIFIED AS TRAUMATIC: ICD-10-CM

## 2025-05-19 ENCOUNTER — APPOINTMENT (OUTPATIENT)
Dept: PULMONOLOGY | Facility: CLINIC | Age: 70
End: 2025-05-19

## 2025-05-19 LAB
25(OH)D3 SERPL-MCNC: 58.2 NG/ML
ALBUMIN SERPL ELPH-MCNC: 4.4 G/DL
ALP BLD-CCNC: 80 U/L
ALT SERPL-CCNC: 15 U/L
ANION GAP SERPL CALC-SCNC: 14 MMOL/L
AST SERPL-CCNC: 17 U/L
BASOPHILS # BLD AUTO: 0.03 K/UL
BASOPHILS NFR BLD AUTO: 0.6 %
BILIRUB SERPL-MCNC: 0.5 MG/DL
BUN SERPL-MCNC: 11 MG/DL
CALCIUM SERPL-MCNC: 9.8 MG/DL
CHLORIDE SERPL-SCNC: 104 MMOL/L
CHOLEST SERPL-MCNC: 170 MG/DL
CO2 SERPL-SCNC: 26 MMOL/L
CREAT SERPL-MCNC: 0.76 MG/DL
EGFRCR SERPLBLD CKD-EPI 2021: 85 ML/MIN/1.73M2
EOSINOPHIL # BLD AUTO: 0.06 K/UL
EOSINOPHIL NFR BLD AUTO: 1.2 %
ESTIMATED AVERAGE GLUCOSE: 126 MG/DL
GLUCOSE SERPL-MCNC: 106 MG/DL
HBA1C MFR BLD HPLC: 6 %
HCT VFR BLD CALC: 44.2 %
HDLC SERPL-MCNC: 57 MG/DL
HGB BLD-MCNC: 14 G/DL
IMM GRANULOCYTES NFR BLD AUTO: 0.2 %
LDLC SERPL-MCNC: 95 MG/DL
LYMPHOCYTES # BLD AUTO: 1.59 K/UL
LYMPHOCYTES NFR BLD AUTO: 31.7 %
MAN DIFF?: NORMAL
MCHC RBC-ENTMCNC: 29.4 PG
MCHC RBC-ENTMCNC: 31.7 G/DL
MCV RBC AUTO: 92.7 FL
MONOCYTES # BLD AUTO: 0.44 K/UL
MONOCYTES NFR BLD AUTO: 8.8 %
NEUTROPHILS # BLD AUTO: 2.88 K/UL
NEUTROPHILS NFR BLD AUTO: 57.5 %
NONHDLC SERPL-MCNC: 112 MG/DL
PLATELET # BLD AUTO: 234 K/UL
POTASSIUM SERPL-SCNC: 4.8 MMOL/L
PROT SERPL-MCNC: 7 G/DL
RBC # BLD: 4.77 M/UL
RBC # FLD: 12.6 %
SODIUM SERPL-SCNC: 144 MMOL/L
TRIGL SERPL-MCNC: 94 MG/DL
WBC # FLD AUTO: 5.01 K/UL

## 2025-05-21 ENCOUNTER — OUTPATIENT (OUTPATIENT)
Dept: OUTPATIENT SERVICES | Facility: HOSPITAL | Age: 70
LOS: 1 days | End: 2025-05-21
Payer: MEDICARE

## 2025-05-21 ENCOUNTER — APPOINTMENT (OUTPATIENT)
Dept: INTERNAL MEDICINE | Facility: CLINIC | Age: 70
End: 2025-05-21
Payer: MEDICARE

## 2025-05-21 VITALS
DIASTOLIC BLOOD PRESSURE: 74 MMHG | HEART RATE: 70 BPM | WEIGHT: 166 LBS | SYSTOLIC BLOOD PRESSURE: 128 MMHG | RESPIRATION RATE: 14 BRPM | BODY MASS INDEX: 28.94 KG/M2

## 2025-05-21 DIAGNOSIS — Z90.710 ACQUIRED ABSENCE OF BOTH CERVIX AND UTERUS: Chronic | ICD-10-CM

## 2025-05-21 DIAGNOSIS — Z98.890 OTHER SPECIFIED POSTPROCEDURAL STATES: Chronic | ICD-10-CM

## 2025-05-21 DIAGNOSIS — Z98.891 HISTORY OF UTERINE SCAR FROM PREVIOUS SURGERY: Chronic | ICD-10-CM

## 2025-05-21 DIAGNOSIS — R53.81 OTHER MALAISE: ICD-10-CM

## 2025-05-21 DIAGNOSIS — I10 ESSENTIAL (PRIMARY) HYPERTENSION: ICD-10-CM

## 2025-05-21 PROCEDURE — 99213 OFFICE O/P EST LOW 20 MIN: CPT

## 2025-05-21 PROCEDURE — G0463: CPT

## 2025-05-21 PROCEDURE — G2211 COMPLEX E/M VISIT ADD ON: CPT

## 2025-05-28 DIAGNOSIS — R53.81 OTHER MALAISE: ICD-10-CM

## 2025-06-04 ENCOUNTER — APPOINTMENT (OUTPATIENT)
Dept: PULMONOLOGY | Facility: CLINIC | Age: 70
End: 2025-06-04

## 2025-06-17 ENCOUNTER — APPOINTMENT (OUTPATIENT)
Dept: DERMATOLOGY | Facility: CLINIC | Age: 70
End: 2025-06-17
Payer: MEDICARE

## 2025-06-17 PROCEDURE — 99214 OFFICE O/P EST MOD 30 MIN: CPT

## 2025-06-17 PROCEDURE — 99204 OFFICE O/P NEW MOD 45 MIN: CPT

## 2025-06-17 RX ORDER — KETOCONAZOLE 20 MG/ML
2 SHAMPOO TOPICAL
Qty: 1 | Refills: 11 | Status: ACTIVE | COMMUNITY
Start: 2025-06-17 | End: 1900-01-01

## 2025-06-17 RX ORDER — MOMETASONE FUROATE 1 MG/ML
0.1 SOLUTION TOPICAL
Qty: 1 | Refills: 11 | Status: ACTIVE | COMMUNITY
Start: 2025-06-17 | End: 1900-01-01

## 2025-06-17 RX ORDER — TRIAMCINOLONE ACETONIDE 1 MG/G
0.1 OINTMENT TOPICAL
Qty: 1 | Refills: 2 | Status: ACTIVE | COMMUNITY
Start: 2025-06-17 | End: 1900-01-01

## 2025-07-08 ENCOUNTER — APPOINTMENT (OUTPATIENT)
Dept: RADIOLOGY | Facility: CLINIC | Age: 70
End: 2025-07-08
Payer: MEDICARE

## 2025-07-08 ENCOUNTER — OUTPATIENT (OUTPATIENT)
Dept: OUTPATIENT SERVICES | Facility: HOSPITAL | Age: 70
LOS: 1 days | End: 2025-07-08
Payer: MEDICARE

## 2025-07-08 DIAGNOSIS — Z01.419 ENCOUNTER FOR GYNECOLOGICAL EXAMINATION (GENERAL) (ROUTINE) WITHOUT ABNORMAL FINDINGS: ICD-10-CM

## 2025-07-08 DIAGNOSIS — Z90.710 ACQUIRED ABSENCE OF BOTH CERVIX AND UTERUS: Chronic | ICD-10-CM

## 2025-07-08 DIAGNOSIS — Z98.891 HISTORY OF UTERINE SCAR FROM PREVIOUS SURGERY: Chronic | ICD-10-CM

## 2025-07-08 DIAGNOSIS — Z98.890 OTHER SPECIFIED POSTPROCEDURAL STATES: Chronic | ICD-10-CM

## 2025-07-08 PROCEDURE — 77085 DXA BONE DENSITY AXL VRT FX: CPT

## 2025-07-08 PROCEDURE — 77085 DXA BONE DENSITY AXL VRT FX: CPT | Mod: 26

## 2025-08-18 ENCOUNTER — APPOINTMENT (OUTPATIENT)
Dept: GYNECOLOGIC ONCOLOGY | Facility: CLINIC | Age: 70
End: 2025-08-18
Payer: MEDICARE

## 2025-08-18 VITALS
SYSTOLIC BLOOD PRESSURE: 148 MMHG | HEIGHT: 63 IN | OXYGEN SATURATION: 100 % | WEIGHT: 162 LBS | HEART RATE: 75 BPM | BODY MASS INDEX: 28.7 KG/M2 | DIASTOLIC BLOOD PRESSURE: 80 MMHG

## 2025-08-18 DIAGNOSIS — C54.1 MALIGNANT NEOPLASM OF ENDOMETRIUM: ICD-10-CM

## 2025-08-18 PROCEDURE — 99459 PELVIC EXAMINATION: CPT

## 2025-08-18 PROCEDURE — 99213 OFFICE O/P EST LOW 20 MIN: CPT

## 2025-08-28 ENCOUNTER — RX RENEWAL (OUTPATIENT)
Age: 70
End: 2025-08-28

## (undated) DEVICE — SYR LUER LOK 10CC

## (undated) DEVICE — BRUSH COLONOSCOPY CYTOLOGY

## (undated) DEVICE — DRAPE 1/2 SHEET 40X57"

## (undated) DEVICE — NDL HYPO REGULAR BEVEL 22G X 1.5" (TURQUOISE)

## (undated) DEVICE — UTERINE MANIPULATOR CONMED VCARE MED 34MM

## (undated) DEVICE — TUBING IV SET GRAVITY 3Y 100" MACRO

## (undated) DEVICE — GLV 7 PROTEXIS (WHITE)

## (undated) DEVICE — SOL INJ NS 0.9% 500ML 2 PORT

## (undated) DEVICE — SENSOR O2 FINGER ADULT

## (undated) DEVICE — Device

## (undated) DEVICE — PREP BETADINE KIT

## (undated) DEVICE — POSITIONER PATIENT SAFETY STRAP 3X60"

## (undated) DEVICE — POLY TRAP ETRAP

## (undated) DEVICE — DRAPE INSTRUMENT POUCH 6.75" X 11"

## (undated) DEVICE — WARMING BLANKET UPPER ADULT

## (undated) DEVICE — SOL IRR POUR H2O 250ML

## (undated) DEVICE — DRAPE LIGHT HANDLE COVER (BLUE)

## (undated) DEVICE — TROCAR SURGIQUEST AIRSEAL 8MMX100MM

## (undated) DEVICE — TUBING SUCTION 20FT

## (undated) DEVICE — SPECIMEN CONTAINER 100ML

## (undated) DEVICE — FORCEP RADIAL JAW 4 JUMBO 2.8MM 3.2MM 240CM ORANGE DISP

## (undated) DEVICE — XI TIP COVER

## (undated) DEVICE — GLV 7.5 PROTEXIS (BLUE)

## (undated) DEVICE — APPLICATOR VISTASEAL LAP DUAL 45CM FLEX

## (undated) DEVICE — SOL IRR BAG NS 0.9% 3000ML

## (undated) DEVICE — DRSG TELFA 3 X 8

## (undated) DEVICE — DRSG STERISTRIPS 0.5 X 4"

## (undated) DEVICE — ELCTR GROUNDING PAD ADULT COVIDIEN

## (undated) DEVICE — XI ARM NEEDLE DRIVER SUTURECUT MEGA 8MM

## (undated) DEVICE — TUBING FLUID ADMINISTRATION SET PRIM 70"

## (undated) DEVICE — POSITIONER FOAM HEADREST (PINK)

## (undated) DEVICE — SUT POLYSORB 0 30" GS-23

## (undated) DEVICE — PACK CYSTO

## (undated) DEVICE — ADAPTER CHECK FLO 9FR STERILE

## (undated) DEVICE — ACMI SELF-SEALING SEAL UP TO 7FR

## (undated) DEVICE — CLAMP BX HOT RAD JAW 3

## (undated) DEVICE — DRSG OPSITE 13.75 X 4"

## (undated) DEVICE — XI ARM DISSECTOR CURVED BIPOLAR 8MM

## (undated) DEVICE — SUT VICRYL 4-0 27" PS-2 UNDYED

## (undated) DEVICE — AVETA CORAL HYSTEROSCOPE 4.6MM DISP

## (undated) DEVICE — SUT VLOC 180 0 9" GS-21 GREEN

## (undated) DEVICE — SYR LUER LOK 50CC

## (undated) DEVICE — CURETTE ENDOMETRIAL GYNOSAMPLER 23.6CM

## (undated) DEVICE — GOWN TRIMAX LG

## (undated) DEVICE — IRRIGATOR BIO SHIELD

## (undated) DEVICE — PACK LITHOTOMY

## (undated) DEVICE — LIGASURE BLUNT TIP 37CM

## (undated) DEVICE — VENODYNE/SCD SLEEVE CALF MEDIUM

## (undated) DEVICE — D HELP - CLEARVIEW CLEARIFY SYSTEM

## (undated) DEVICE — ENDOCATCH 10MM SPECIMEN POUCH

## (undated) DEVICE — SOL IRR POUR NS 0.9% 500ML

## (undated) DEVICE — DRAPE EQUIPMENT COVER 27"

## (undated) DEVICE — VENODYNE/SCD SLEEVE CALF LARGE

## (undated) DEVICE — OS FINDER

## (undated) DEVICE — PREP BETADINE SPONGE STICKS

## (undated) DEVICE — BIOPSY FORCEP RADIAL JAW 4 STANDARD WITH NEEDLE

## (undated) DEVICE — FOLEY TRAY 16FR 5CC LTX UMETER CLOSED

## (undated) DEVICE — SOL IRR BAG H2O 3000ML

## (undated) DEVICE — GOWN XL

## (undated) DEVICE — XI ARM NEEDLE DRIVER LARGE

## (undated) DEVICE — TIP METZENBAUM SCISSOR MONOPOLAR ENDOCUT (ORANGE)

## (undated) DEVICE — SUT POLYSORB 0 30" GS-22

## (undated) DEVICE — POSITIONER FOAM EGG CRATE ULNAR 2PCS (PINK)

## (undated) DEVICE — PACK D&C

## (undated) DEVICE — SUT POLYSORB 0 30" GS-22 UNDYED

## (undated) DEVICE — UTERINE MANIPULATOR CONMED VCARE SM 32MM

## (undated) DEVICE — XI DRAPE COLUMN

## (undated) DEVICE — AVETA FLUID MANAGEMENT ACCESSORY

## (undated) DEVICE — DRSG BENZOIN 0.6CC

## (undated) DEVICE — POSITIONER PURPLE ARM ONE STEP (LARGE)

## (undated) DEVICE — SUCTION YANKAUER NO CONTROL VENT

## (undated) DEVICE — SOL IRR POUR H2O 1500ML

## (undated) DEVICE — ELCTR BOVIE PENCIL SMOKE EVACUATION

## (undated) DEVICE — FOLEY HOLDER STATLOCK 2 WAY ADULT

## (undated) DEVICE — GLV 7.5 PROTEXIS (WHITE)

## (undated) DEVICE — PRESSURE INFUSOR BAG 1000ML

## (undated) DEVICE — XI ARM FORCEP MARYLAND BIPOLAR

## (undated) DEVICE — PACK IV START WITH CHG

## (undated) DEVICE — XI ARM FORCEP PROGRASP 8MM

## (undated) DEVICE — XI OBTURATOR OPTICAL BLADELESS 8MM

## (undated) DEVICE — RUMI TIP GREEN 6.7MM X 10CM

## (undated) DEVICE — SYR LUER LOK 20CC

## (undated) DEVICE — TUBING AIRSEAL TRI-LUMEN FILTERED

## (undated) DEVICE — DRAPE LIGHT HANDLE COVER (GREEN)

## (undated) DEVICE — XI ARM SCISSOR MONO CURVED

## (undated) DEVICE — LUBRICATING JELLY ONESHOT 1.25OZ

## (undated) DEVICE — TUBING SUCTION CONN 6FT STERILE

## (undated) DEVICE — XI DRAPE ARM

## (undated) DEVICE — GLV 6.5 PROTEXIS (WHITE)

## (undated) DEVICE — SOL INJ NS 0.9% 1000ML

## (undated) DEVICE — CATH IV SAFE BC 20G X 1.16" (PINK)

## (undated) DEVICE — AVETA FLUID MANAGEMENT ACCESSORY W CAP

## (undated) DEVICE — TUBING INSUFFLATION LAP FILTER 10FT

## (undated) DEVICE — FOLEY CATH 2-WAY 26FR 5CC SILICONE

## (undated) DEVICE — XI ARM FORCEP FENESTRATED BIPOLAR 8MM

## (undated) DEVICE — POSITIONER PINK PAD PIGAZZI SYSTEM

## (undated) DEVICE — PACK ROBOTIC LIJ

## (undated) DEVICE — IRR BULB PATHFINDER + 10"

## (undated) DEVICE — COLONOSCOPE 2416901: Type: DURABLE MEDICAL EQUIPMENT

## (undated) DEVICE — BLADE SCALPEL SAFETYLOCK #15

## (undated) DEVICE — ENDOCUFF VISION SZ 2 LG GRN

## (undated) DEVICE — CATH IV SAFE BC 22G X 1" (BLUE)

## (undated) DEVICE — UTERINE MANIPULATOR CONMED VCARE LG 37MM

## (undated) DEVICE — XI SEAL UNIVERSIAL 5-12MM